# Patient Record
Sex: FEMALE | Race: BLACK OR AFRICAN AMERICAN | NOT HISPANIC OR LATINO | URBAN - METROPOLITAN AREA
[De-identification: names, ages, dates, MRNs, and addresses within clinical notes are randomized per-mention and may not be internally consistent; named-entity substitution may affect disease eponyms.]

---

## 2021-12-21 ENCOUNTER — EMERGENCY (EMERGENCY)
Facility: HOSPITAL | Age: 46
LOS: 0 days | Discharge: HOME | End: 2021-12-21
Attending: EMERGENCY MEDICINE
Payer: COMMERCIAL

## 2021-12-21 VITALS
WEIGHT: 177.91 LBS | HEART RATE: 100 BPM | DIASTOLIC BLOOD PRESSURE: 95 MMHG | RESPIRATION RATE: 20 BRPM | SYSTOLIC BLOOD PRESSURE: 153 MMHG | TEMPERATURE: 98 F | OXYGEN SATURATION: 99 % | HEIGHT: 62 IN

## 2021-12-21 VITALS
SYSTOLIC BLOOD PRESSURE: 150 MMHG | OXYGEN SATURATION: 97 % | TEMPERATURE: 99 F | RESPIRATION RATE: 18 BRPM | DIASTOLIC BLOOD PRESSURE: 87 MMHG | HEART RATE: 90 BPM

## 2021-12-21 DIAGNOSIS — M79.644 PAIN IN RIGHT FINGER(S): ICD-10-CM

## 2021-12-21 DIAGNOSIS — I10 ESSENTIAL (PRIMARY) HYPERTENSION: ICD-10-CM

## 2021-12-21 DIAGNOSIS — L03.011 CELLULITIS OF RIGHT FINGER: ICD-10-CM

## 2021-12-21 PROCEDURE — 10061 I&D ABSCESS COMP/MULTIPLE: CPT

## 2021-12-21 PROCEDURE — 99283 EMERGENCY DEPT VISIT LOW MDM: CPT | Mod: 25

## 2021-12-21 RX ORDER — IBUPROFEN 200 MG
1 TABLET ORAL
Qty: 15 | Refills: 0
Start: 2021-12-21 | End: 2021-12-25

## 2021-12-21 RX ORDER — OXYCODONE AND ACETAMINOPHEN 5; 325 MG/1; MG/1
1 TABLET ORAL
Qty: 15 | Refills: 0
Start: 2021-12-21 | End: 2021-12-25

## 2021-12-21 RX ADMIN — Medication 1 TABLET(S): at 12:49

## 2021-12-21 NOTE — ED PROVIDER NOTE - CARE PLAN
Principal Discharge DX:	Paronychia of finger, right   1 Principal Discharge DX:	Felon of finger of right hand

## 2021-12-21 NOTE — ED PROVIDER NOTE - PROVIDER TOKENS
PROVIDER:[TOKEN:[[id number]],FOLLOWUP:[4-6 Days]] PROVIDER:[TOKEN:[[id number]],FOLLOWUP:[1-3 Days]]

## 2021-12-21 NOTE — ED PROVIDER NOTE - CARE PROVIDER_API CALL
Carlos Estevez (MD)  Plastic Surgery  [address] [place] [address], [address]  [place], [place] [id number]  Phone: [phone]  Fax: [phone]  Follow Up Time: 4-6 Days   Carlos Estevez (MD)  Plastic Surgery  [address] [place] [address], [address]  [place], [place] [id number]  Phone: [phone]  Fax: [phone]  Follow Up Time: 1-3 Days

## 2021-12-21 NOTE — ED ADULT TRIAGE NOTE - CHIEF COMPLAINT QUOTE
Right middle finger with reddens and swelling around nail bed since Friday, stopped BP med due to pain in finger

## 2021-12-21 NOTE — ED PROVIDER NOTE - OBJECTIVE STATEMENT
46F no relevant med hx pw right middle finger throbbing pain. pt notes symptoms x 4 days. 1 week ago she felt something poke her into the skin under the fingernail. now it hurts a lot and is red and swollen. no fever, chills, nausea, vomiting. ros neg for ha, vision loss, rhinorrhea, dysuria, rash, bleeding, cp, sob, cough, anxiety

## 2021-12-21 NOTE — ED PROVIDER NOTE - NSFOLLOWUPINSTRUCTIONS_ED_ALL_ED_FT
Take both antibiotics as prescribed completely.    Call the hand specialist for a follow up appointment. Take both antibiotics as prescribed completely.    Call the hand specialist for a follow up appointment.    Take ibuprofen for moderate pain.    Take percocet for severe pain.    The packing strips need to be removed - the hand specialist can otherwise, return to the ER.

## 2021-12-21 NOTE — ED ADULT NURSE NOTE - OBJECTIVE STATEMENT
Patient A&Ox4. Pt states that on Friday when she was reaching into her bag when something poked under her nail on her right hand middle finger and caused her to bleed. Pt states pain got worse over the last few days and she was worried it was infected. Pain 10/10. No bleeding noted now. Denies fever, chills. PMH HTN.

## 2021-12-21 NOTE — ED PROVIDER NOTE - PHYSICAL EXAMINATION
Gen: Alert, NAD  Head: NC, AT   Eyes: PERRL, EOMI, normal lids/conjunctiva  ENT: normal hearing, patent oropharynx without erythema/exudate, uvula midline  Neck: supple, no tenderness, Trachea midline  Pulm: Bilateral BS, normal resp effort, no wheeze/stridor/retractions  CV: RRR, no M/R/G, 2+ radial and dp pulses bl, no edema  Abd: soft, NT/ND, +BS, no hepatosplenomegaly  Mskel: extremities x4 with normal ROM and no joint effusions. no ctl spine ttp.   Skin: right middle finger paronychia   Neuro: AAOx3, no sensory/motor deficits, CN 2-12 intact Gen: Alert, NAD  Head: NC, AT   Eyes: PERRL, EOMI, normal lids/conjunctiva  ENT: normal hearing, patent oropharynx without erythema/exudate, uvula midline  Neck: supple, no tenderness, Trachea midline  Pulm: Bilateral BS, normal resp effort, no wheeze/stridor/retractions  CV: RRR, no M/R/G, 2+ radial and dp pulses bl, no edema  Abd: soft, NT/ND, +BS, no hepatosplenomegaly  Mskel: extremities x4 with normal ROM and no joint effusions. no ctl spine ttp.   Skin: right middle finger felon  Neuro: AAOx3, no sensory/motor deficits, CN 2-12 intact

## 2021-12-21 NOTE — ED PROVIDER NOTE - PATIENT PORTAL LINK FT
You can access the FollowMyHealth Patient Portal offered by [facility] by registering at the following website: [email or web address]. By joining [facility] [employer]’s FollowMyHealth portal, you will also be able to view your health information using other applications (apps) compatible with our system.

## 2021-12-23 PROBLEM — Z00.00 ENCOUNTER FOR PREVENTIVE HEALTH EXAMINATION: Status: ACTIVE | Noted: 2021-12-23

## 2022-03-16 NOTE — ED ADULT NURSE NOTE - LATERALITY
From: Alexsandra Bonilla  To: Karena Gamble  Sent: 3/15/2022 6:53 PM CDT  Subject: Mycroplasm    Hi I went to  my medication moxifloxacin (AVELOX) many attempts they keep saying it’s not there for me and that u never sent it   
right 3rd

## 2022-12-20 PROBLEM — I10 ESSENTIAL (PRIMARY) HYPERTENSION: Chronic | Status: ACTIVE | Noted: 2021-12-21

## 2022-12-27 ENCOUNTER — APPOINTMENT (OUTPATIENT)
Dept: BREAST CENTER | Facility: CLINIC | Age: 47
End: 2022-12-27

## 2022-12-27 ENCOUNTER — NON-APPOINTMENT (OUTPATIENT)
Age: 47
End: 2022-12-27

## 2022-12-27 VITALS
BODY MASS INDEX: 33.15 KG/M2 | HEART RATE: 80 BPM | DIASTOLIC BLOOD PRESSURE: 86 MMHG | SYSTOLIC BLOOD PRESSURE: 143 MMHG | HEIGHT: 62 IN | WEIGHT: 180.13 LBS

## 2022-12-27 DIAGNOSIS — I10 ESSENTIAL (PRIMARY) HYPERTENSION: ICD-10-CM

## 2022-12-27 DIAGNOSIS — Z78.9 OTHER SPECIFIED HEALTH STATUS: ICD-10-CM

## 2022-12-27 PROCEDURE — 99205 OFFICE O/P NEW HI 60 MIN: CPT

## 2022-12-29 NOTE — PHYSICAL EXAM
[Examined in the supine and seated position] : examined in the supine and seated position [Asymmetrical] : asymmetrical [No dominant masses] : no dominant masses in right breast  [No Nipple Retraction] : no left nipple retraction [No Nipple Discharge] : no left nipple discharge [No Axillary Lymphadenopathy] : no right axillary lymphadenopathy [de-identified] : Posterior cervical chain palpable lymph node  [de-identified] : No discrete masses [de-identified] : Palpable lymphadenopathy

## 2022-12-29 NOTE — DATA REVIEWED
[FreeTextEntry1] : 12/2/22 B/L dxMMG/US ([facility]): The left breast 0.6 x 0.5 x 0.5 cm indistinct irregular hypoechoic mass at 10:00 14 CMFN corresponds with mammography and is suspicious for malignancy. Left breast ultrasound-guided core biopsy with microclip placement and postprocedural mammographic correlation is recommended. The 0.9 x 0.8 x 0.7 cm irregular complex cystic mass at 2:00 4 CMFN is indeterminate. Left breast ultrasound-guided core biopsy with microclip placement and postprocedural mammographic correlation is recommended. The 1.8 x 1.2 cm abnormally thickened lymph node at 2:00 16 CMFN is suspicious for malignancy. Left breast ultrasound-guided core biopsy with microclip placement and postprocedural mammographic correlation is recommended. No mammographic or sonographic evidence of malignancy in the right breast. BIRADS 5.\par

## 2022-12-29 NOTE — ASSESSMENT
[FreeTextEntry1] : 46 yo F referred by Dr. 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 Patient initially experienced R breast symptoms in November 2022 prompting diagnostic workup that found L breast cancer. Of note, third biopsy also performed LEFT 2n4 showing radial scar with calcifications. Palpable L axillary and posterior cervical chain lymphadenopathy on physical exam. Discussed patient's diagnosis and treatment options in detail. She states she already has PET/CT scheduled at outside imaging facility on 12/29/22 and will send us report. She will have PET/CT, B/L MRI, and office visit with medical oncology Dr. Chavis within the next week and return to our office for reexamination in 2 weeks. Patient verbalized understanding and agreement of plan.\par \par Discussed importance and implication of genetic testing in regards to her family history and offspring. Patient would like to go forward with genetic testing, invitae 47 gene panel sent. Enrolled patient in [employer] study, consent obtained and a copy given to patient.\par \par \par \par

## 2022-12-29 NOTE — CONSULT LETTER
[Dear  ___] : Dear ~RONNIE, [Consult Letter:] : I had the pleasure of evaluating your patient, [unfilled]. [Please see my note below.] : Please see my note below. [Consult Closing:] : Thank you very much for allowing me to participate in the care of this patient.  If you have any questions, please do not hesitate to contact me. [Sincerely,] : Sincerely, [FreeTextEntry2] : Dr. Cottrell [FreeTextEntry3] : Gautam\par \par Gautam Leal MD\par Chief of Breast Surgery\par Director of Breast Cancer Program\par [facility]\par \par \par

## 2022-12-29 NOTE — HISTORY OF PRESENT ILLNESS
[FreeTextEntry1] : 48 yo F referred by Dr. 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 Patient initially experienced R breast symptoms which she describes as similar to menstrual symptoms in November 2022 prompting diagnostic workup that detected L breast cancer. Of note, third biopsy also performed LEFT 2n4 showing radial scar with calcifications. \par \par Denies family history of breast or ovarian CA. Denies any history of breast surgeries or biopsies.\par Denies any palpable abnormalities, skin changes, nipple changes, or nipple discharge bilaterally.\par

## 2022-12-29 NOTE — PAST MEDICAL HISTORY
[Menstruating] : The patient is menstruating [Menarche Age ____] : age at menarche was [unfilled] [Definite ___ (Date)] : the last menstrual period was [unfilled] [Regular Cycle Intervals] : have been regular [Total Preg ___] : G[unfilled] [Live Births ___] : P[unfilled]  [Premature ___] : Premature: [unfilled] [Age At Live Birth ___] : Age at live birth: [unfilled] [History of Hormone Replacement Treatment] : has no history of hormone replacement treatment [FreeTextEntry6] : no [FreeTextEntry7] : no [FreeTextEntry8] : yes

## 2022-12-30 ENCOUNTER — APPOINTMENT (OUTPATIENT)
Dept: MRI IMAGING | Facility: HOSPITAL | Age: 47
End: 2022-12-30

## 2022-12-30 ENCOUNTER — OUTPATIENT (OUTPATIENT)
Dept: OUTPATIENT SERVICES | Facility: HOSPITAL | Age: 47
LOS: 1 days | End: 2022-12-30
Payer: COMMERCIAL

## 2022-12-30 PROCEDURE — 77049 MRI BREAST C-+ W/CAD BI: CPT | Mod: 26

## 2022-12-30 PROCEDURE — C8937: CPT

## 2022-12-30 PROCEDURE — A9585: CPT

## 2022-12-30 PROCEDURE — C8908: CPT

## 2023-01-03 ENCOUNTER — APPOINTMENT (OUTPATIENT)
Dept: HEMATOLOGY ONCOLOGY | Facility: CLINIC | Age: 48
End: 2023-01-03
Payer: COMMERCIAL

## 2023-01-03 ENCOUNTER — TRANSCRIPTION ENCOUNTER (OUTPATIENT)
Age: 48
End: 2023-01-03

## 2023-01-03 VITALS
WEIGHT: 179 LBS | OXYGEN SATURATION: 99 % | RESPIRATION RATE: 18 BRPM | TEMPERATURE: 98.9 F | HEART RATE: 78 BPM | DIASTOLIC BLOOD PRESSURE: 89 MMHG | HEIGHT: 62 IN | SYSTOLIC BLOOD PRESSURE: 138 MMHG | BODY MASS INDEX: 32.94 KG/M2

## 2023-01-03 PROCEDURE — 99205 OFFICE O/P NEW HI 60 MIN: CPT

## 2023-01-03 NOTE — HISTORY OF PRESENT ILLNESS
[Disease: _____________________] : Disease: [unfilled] [T: ___] : T[unfilled] [N: ___] : N[unfilled] [M: ___] : M[unfilled] [AJCC Stage: ____] : AJCC Stage: [unfilled] [de-identified] : 48 y/o female who is being seen at the request of Dr. Leal to discuss neoadjuvant systemic treatment options for a recently diagnosed left breast cancer.\par \par Patient initially experienced R breast symptoms which she describes as similar to menstrual symptoms in November 2022 prompting diagnostic workup that detected a left breast cancer, invasive ductal carcinoma, ER/[place] (-) HER-2 (+) with metastasis to lymph node. Left breast and axillary lymph node were both biopsied on 12/9/22.\par  \par Denies family history of breast or ovarian CA. Denies any history of breast surgeries or biopsies.\par \par Denies any palpable abnormalities, skin changes, nipple changes, or nipple discharge bilaterally. [de-identified] : invasive ductal carcinoma [de-identified] : ER-, [place]- HER2+

## 2023-01-03 NOTE — PHYSICAL EXAM
[Normal] : affect appropriate [de-identified] : Left breast mass not easily palpable. Left axillary lymph node enlarged

## 2023-01-03 NOTE — REASON FOR VISIT
[Consultation] : a consultation visit [Family Member] : family member [FreeTextEntry1] : Left breast cancer with lymph node metastasis

## 2023-01-04 ENCOUNTER — NON-APPOINTMENT (OUTPATIENT)
Age: 48
End: 2023-01-04

## 2023-01-04 DIAGNOSIS — R92.8 OTHER ABNORMAL AND INCONCLUSIVE FINDINGS ON DIAGNOSTIC IMAGING OF BREAST: ICD-10-CM

## 2023-01-06 ENCOUNTER — NON-APPOINTMENT (OUTPATIENT)
Age: 48
End: 2023-01-06

## 2023-01-06 ENCOUNTER — APPOINTMENT (OUTPATIENT)
Dept: NUCLEAR MEDICINE | Facility: HOSPITAL | Age: 48
End: 2023-01-06

## 2023-01-06 ENCOUNTER — OUTPATIENT (OUTPATIENT)
Dept: OUTPATIENT SERVICES | Facility: HOSPITAL | Age: 48
LOS: 1 days | End: 2023-01-06
Payer: COMMERCIAL

## 2023-01-06 ENCOUNTER — RESULT REVIEW (OUTPATIENT)
Age: 48
End: 2023-01-06

## 2023-01-06 LAB — GLUCOSE BLDC GLUCOMTR-MCNC: 91 MG/DL — SIGNIFICANT CHANGE UP (ref 70–99)

## 2023-01-06 PROCEDURE — A9552: CPT

## 2023-01-06 PROCEDURE — 78815 PET IMAGE W/CT SKULL-THIGH: CPT

## 2023-01-06 PROCEDURE — 78815 PET IMAGE W/CT SKULL-THIGH: CPT | Mod: 26,PI

## 2023-01-06 PROCEDURE — 82962 GLUCOSE BLOOD TEST: CPT

## 2023-01-10 ENCOUNTER — NON-APPOINTMENT (OUTPATIENT)
Age: 48
End: 2023-01-10

## 2023-01-10 ENCOUNTER — APPOINTMENT (OUTPATIENT)
Dept: BREAST CENTER | Facility: CLINIC | Age: 48
End: 2023-01-10

## 2023-01-10 ENCOUNTER — APPOINTMENT (OUTPATIENT)
Dept: ULTRASOUND IMAGING | Facility: HOSPITAL | Age: 48
End: 2023-01-10

## 2023-01-10 ENCOUNTER — RESULT REVIEW (OUTPATIENT)
Age: 48
End: 2023-01-10

## 2023-01-10 ENCOUNTER — OUTPATIENT (OUTPATIENT)
Dept: OUTPATIENT SERVICES | Facility: HOSPITAL | Age: 48
LOS: 1 days | End: 2023-01-10
Payer: COMMERCIAL

## 2023-01-10 ENCOUNTER — APPOINTMENT (OUTPATIENT)
Dept: HEMATOLOGY ONCOLOGY | Facility: CLINIC | Age: 48
End: 2023-01-10
Payer: COMMERCIAL

## 2023-01-10 VITALS
OXYGEN SATURATION: 98 % | WEIGHT: 178 LBS | RESPIRATION RATE: 18 BRPM | TEMPERATURE: 98.5 F | HEART RATE: 89 BPM | HEIGHT: 62 IN | BODY MASS INDEX: 32.76 KG/M2 | SYSTOLIC BLOOD PRESSURE: 135 MMHG | DIASTOLIC BLOOD PRESSURE: 85 MMHG

## 2023-01-10 DIAGNOSIS — C50.912 MALIGNANT NEOPLASM OF UNSPECIFIED SITE OF LEFT FEMALE BREAST: ICD-10-CM

## 2023-01-10 DIAGNOSIS — M26.609 UNSPECIFIED TEMPOROMANDIBULAR JOINT DISORDER: ICD-10-CM

## 2023-01-10 DIAGNOSIS — E04.2 NONTOXIC MULTINODULAR GOITER: ICD-10-CM

## 2023-01-10 DIAGNOSIS — E04.9 NONTOXIC GOITER, UNSPECIFIED: ICD-10-CM

## 2023-01-10 PROCEDURE — 88305 TISSUE EXAM BY PATHOLOGIST: CPT | Mod: 26,59

## 2023-01-10 PROCEDURE — 77065 DX MAMMO INCL CAD UNI: CPT

## 2023-01-10 PROCEDURE — 88360 TUMOR IMMUNOHISTOCHEM/MANUAL: CPT

## 2023-01-10 PROCEDURE — A4648: CPT

## 2023-01-10 PROCEDURE — 19083 BX BREAST 1ST LESION US IMAG: CPT | Mod: LT

## 2023-01-10 PROCEDURE — 88321 CONSLTJ&REPRT SLD PREP ELSWR: CPT

## 2023-01-10 PROCEDURE — 77065 DX MAMMO INCL CAD UNI: CPT | Mod: 26,LT

## 2023-01-10 PROCEDURE — 93306 TTE W/DOPPLER COMPLETE: CPT

## 2023-01-10 PROCEDURE — 93356 MYOCRD STRAIN IMG SPCKL TRCK: CPT | Mod: 26

## 2023-01-10 PROCEDURE — 76642 ULTRASOUND BREAST LIMITED: CPT | Mod: 26,LT

## 2023-01-10 PROCEDURE — 88360 TUMOR IMMUNOHISTOCHEM/MANUAL: CPT | Mod: 26,59

## 2023-01-10 PROCEDURE — 99215 OFFICE O/P EST HI 40 MIN: CPT

## 2023-01-10 PROCEDURE — 19083 BX BREAST 1ST LESION US IMAG: CPT

## 2023-01-10 PROCEDURE — 93306 TTE W/DOPPLER COMPLETE: CPT | Mod: 26

## 2023-01-10 PROCEDURE — 76642 ULTRASOUND BREAST LIMITED: CPT

## 2023-01-10 PROCEDURE — 88341 IMHCHEM/IMCYTCHM EA ADD ANTB: CPT

## 2023-01-10 PROCEDURE — 88305 TISSUE EXAM BY PATHOLOGIST: CPT

## 2023-01-11 PROBLEM — M26.609 TMJ (TEMPOROMANDIBULAR JOINT DISORDER): Status: ACTIVE | Noted: 2023-01-11

## 2023-01-11 PROBLEM — E04.9 GOITER, NODULAR: Status: ACTIVE | Noted: 2023-01-11

## 2023-01-11 PROBLEM — E04.2 MULTIPLE THYROID NODULES: Status: ACTIVE | Noted: 2023-01-11

## 2023-01-11 LAB
SURGICAL PATHOLOGY STUDY: SIGNIFICANT CHANGE UP
SURGICAL PATHOLOGY STUDY: SIGNIFICANT CHANGE UP

## 2023-01-11 NOTE — REVIEW OF SYSTEMS
[Anxiety] : anxiety [Depression] : depression [Dysphagia] : no dysphagia [Chest Pain] : no chest pain [Shortness Of Breath] : no shortness of breath [Suicidal] : not suicidal [de-identified] : multinodular thyroid goiter

## 2023-01-11 NOTE — HISTORY OF PRESENT ILLNESS
[de-identified] : 46 y/o female who is being seen at the request of Dr. Leal to discuss neoadjuvant systemic treatment options for a recently diagnosed left breast cancer.  We are planning to start neoadjuvant TCHP pending completion of initial staging work-up.\par \par Patient initially experienced R breast heaviness which she describes as similar to menstrual symptoms in November 2022 prompting diagnostic workup that detected a left breast cancer, invasive ductal carcinoma, ER/[place] (-) HER-2 (+) with metastasis to lymph node. Left breast and axillary lymph node were both biopsied on 12/9/22.\par  \par Denies family history of breast or ovarian CA. Denies any history of breast surgeries or biopsies.\par \par Denies any palpable abnormalities, skin changes, nipple changes, or nipple discharge bilaterally.\par \par 12/2/22 B/L dxMMG/US ([facility]): The left breast 0.6 x 0.5 x 0.5 cm indistinct irregular hypoechoic mass at 10:00 14 CMFN corresponds with mammography and is suspicious for malignancy. The 0.9 x 0.8 x 0.7 cm irregular complex cystic mass at 2:00 4 CMFN is indeterminate. The 1.8 x 1.2 cm abnormally thickened lymph node at 2:00 16 CMFN is suspicious for malignancy. No mammographic or sonographic evidence of malignancy in the right breast. BIRADS 5.\par \par 12/9/22 biopsy x 3:\par A. Left breast 2:00 4cmFN core biopsy: radial scar with calcifications, columnar cell change, cystic apocrine metaplasia.\par B. Left breast 2:00 16cm FN core biopsy: cortically thickened lymph node in the left axilla, metastatic carcinoma morphologically similar to part C.\par C. Left breast 10:00 14cm FN core biopsy: invasive moderately to poorly differentiated ductal carcinoma with micropapillary features, spanning at least 7 mm in this material.  Adjacent benign breast tissue with fibrocystic changes.  ER-, [place]-, HER2+ \par \par 12/30/22 MRI: \par 1. Left breast cancer 10:00 (14N) (buckle clip), newly diagnosed cancer.  Measures about 6 mm.\par 2. Left breast, 12:00 (15N) (near chest wall), there is an irregular 32 x 12 x 14 mm mass corresponding to the area of distortion seen on the recent mammogram.  There is no clip in this mass.  Recommend second look ultrasound and biopsy.  If not seen on ultrasound, recommend stereotactic biopsy.\par 3. Left 2:00 position, 4cm from  nipple (top hat shaped clip) there is an irregular 5 mm mass corresponding to the biopsy showing radial scar.\par 4. Left axillary adenopathy.  Corresponds to biopsy (cork clip) showing metastatic cancer.\par \par 1/6/23 PETCT results pending.\par \par 1/10/23 ECHO: LVEF 65-70%\par \par 1/10/23 L breast US: Left breast 12:00 14 cm from the nipple, there is an irregular 33 x 7 x 15 mm mass corresponding to MRI finding.\par US guided L breast core biopsy pathology pending. [de-identified] : moderately to poorly invasive ductal carcinoma [de-identified] : ER-, [place]- HER2+  [de-identified] : 12/27/22 ([facility]) 47 HBOC gene panel: negative for pathogenic mutations [de-identified] : Patient presents today for chemotherapy education.  She had a PETCT on 1/6/23, results are still pending.  She had a left breast US, biopsy and ECHO today.  Awaiting IR to schedule port placement.  She is tearful and overwhelmed by her diagnosis.  She is followed by ENT for thyroid multinodular goiter, had 5 biopsies that were benign, last thyroid US last year.  Denies any compressive symptoms, such as dysphagia or dyspnea.  She is premenopausal, LMP 12/2022, has an adult son, does not plan on having additional children, deferred fertility referral.  Sexually active, does not use contraception.

## 2023-01-11 NOTE — PAST MEDICAL HISTORY
[History of Hormone Replacement Treatment] : has no history of hormone replacement treatment [FreeTextEntry6] : no [FreeTextEntry7] : no [FreeTextEntry8] : yes

## 2023-01-11 NOTE — PHYSICAL EXAM
[Normal] : well developed, well nourished, in no acute distress [de-identified] : thyromegaly [de-identified] : no respiratory distress [de-identified] : tearful, upset and anxious about breast cancer diagnosis

## 2023-01-12 ENCOUNTER — NON-APPOINTMENT (OUTPATIENT)
Age: 48
End: 2023-01-12

## 2023-01-12 LAB
ALBUMIN SERPL ELPH-MCNC: 4.4 G/DL
ALP BLD-CCNC: 84 U/L
ALT SERPL-CCNC: 9 U/L
ANION GAP SERPL CALC-SCNC: 8 MMOL/L
APTT BLD: 34.3 SEC
AST SERPL-CCNC: 13 U/L
BASOPHILS # BLD AUTO: 0.02 K/UL
BASOPHILS NFR BLD AUTO: 0.3 %
BILIRUB SERPL-MCNC: 0.3 MG/DL
BUN SERPL-MCNC: 8 MG/DL
CALCIUM SERPL-MCNC: 9.2 MG/DL
CHLORIDE SERPL-SCNC: 103 MMOL/L
CO2 SERPL-SCNC: 28 MMOL/L
CREAT SERPL-MCNC: 0.68 MG/DL
EGFR: 109 ML/MIN/1.73M2
EOSINOPHIL # BLD AUTO: 0.22 K/UL
EOSINOPHIL NFR BLD AUTO: 3.7 %
GLUCOSE SERPL-MCNC: 101 MG/DL
HBV CORE IGG+IGM SER QL: NONREACTIVE
HBV CORE IGM SER QL: NONREACTIVE
HBV SURFACE AB SER QL: NONREACTIVE
HBV SURFACE AG SER QL: NONREACTIVE
HCT VFR BLD CALC: 38.1 %
HGB BLD-MCNC: 12.6 G/DL
IMM GRANULOCYTES NFR BLD AUTO: 0.2 %
INR PPP: 1.11
LYMPHOCYTES # BLD AUTO: 2.87 K/UL
LYMPHOCYTES NFR BLD AUTO: 48.9 %
MAN DIFF?: NORMAL
MCHC RBC-ENTMCNC: 28.7 PG
MCHC RBC-ENTMCNC: 33.1 GM/DL
MCV RBC AUTO: 86.8 FL
MONOCYTES # BLD AUTO: 0.52 K/UL
MONOCYTES NFR BLD AUTO: 8.9 %
NEUTROPHILS # BLD AUTO: 2.23 K/UL
NEUTROPHILS NFR BLD AUTO: 38 %
PLATELET # BLD AUTO: 361 K/UL
POTASSIUM SERPL-SCNC: 3.7 MMOL/L
PROT SERPL-MCNC: 8 G/DL
PT BLD: 13.2 SEC
RBC # BLD: 4.39 M/UL
RBC # FLD: 13.5 %
SODIUM SERPL-SCNC: 139 MMOL/L
WBC # FLD AUTO: 5.87 K/UL

## 2023-01-13 ENCOUNTER — APPOINTMENT (OUTPATIENT)
Dept: INFUSION THERAPY | Facility: CLINIC | Age: 48
End: 2023-01-13

## 2023-01-18 DIAGNOSIS — R59.0 LOCALIZED ENLARGED LYMPH NODES: ICD-10-CM

## 2023-01-19 ENCOUNTER — APPOINTMENT (OUTPATIENT)
Dept: HEMATOLOGY ONCOLOGY | Facility: CLINIC | Age: 48
End: 2023-01-19
Payer: COMMERCIAL

## 2023-01-19 VITALS
SYSTOLIC BLOOD PRESSURE: 141 MMHG | HEIGHT: 62 IN | RESPIRATION RATE: 18 BRPM | DIASTOLIC BLOOD PRESSURE: 92 MMHG | TEMPERATURE: 97.4 F | WEIGHT: 178 LBS | HEART RATE: 83 BPM | BODY MASS INDEX: 32.76 KG/M2

## 2023-01-19 PROCEDURE — 99215 OFFICE O/P EST HI 40 MIN: CPT

## 2023-01-19 NOTE — PHYSICAL EXAM
[Normal] : normoactive bowel sounds, soft and nontender, no hepatosplenomegaly or masses appreciated [de-identified] : Left breast mass unchanged

## 2023-01-19 NOTE — HISTORY OF PRESENT ILLNESS
[Disease: _____________________] : Disease: [unfilled] [T: ___] : T[unfilled] [N: ___] : N[unfilled] [M: ___] : M[unfilled] [AJCC Stage: ____] : AJCC Stage: [unfilled] [de-identified] : 48 y/o female who is being seen at the request of Dr. Leal to discuss neoadjuvant systemic treatment options for a recently diagnosed left breast cancer.  She is scheduled to start neoadjuvant TCHP on 1/23/23.\par \par PMHx:  HTN, multinodular goiter, followed by ENT, had 5 biopsies that were benign, last thyroid US last year.  Denies any compressive symptoms, such as dysphagia or dyspnea.  \par \par Patient initially experienced R breast heaviness which she describes as similar to menstrual symptoms in November 2022 prompting diagnostic workup that detected a left breast cancer, invasive ductal carcinoma, ER/[place] (-) HER-2 (+) with metastasis to lymph node. Left breast and axillary lymph node were both biopsied on 12/9/22.\par  \par Denies family history of breast or ovarian CA. Denies any history of breast surgeries or biopsies.\par \par Denies any palpable abnormalities, skin changes, nipple changes, or nipple discharge bilaterally.\par \par 12/2/22 B/L dxMMG/US ([facility]): The left breast 0.6 x 0.5 x 0.5 cm indistinct irregular hypoechoic mass at 10:00 14 CMFN corresponds with mammography and is suspicious for malignancy. The 0.9 x 0.8 x 0.7 cm irregular complex cystic mass at 2:00 4 CMFN is indeterminate. The 1.8 x 1.2 cm abnormally thickened lymph node at 2:00 16 CMFN is suspicious for malignancy. No mammographic or sonographic evidence of malignancy in the right breast. BIRADS 5.\par \par 12/9/22 biopsy x 3:\par A. Left breast 2:00 4cmFN core biopsy: radial scar with calcifications, columnar cell change, cystic apocrine metaplasia.\par B. Left breast 2:00 16cm FN core biopsy: cortically thickened lymph node in the left axilla, metastatic carcinoma morphologically similar to part C.\par C. Left breast 10:00 14cm FN core biopsy: invasive moderately to poorly differentiated ductal carcinoma with micropapillary features, spanning at least 7 mm in this material.  Adjacent benign breast tissue with fibrocystic changes.  ER-, [place]-, HER2+ \par \par 12/30/22 MRI: \par 1. Left breast cancer 10:00 (14N) (buckle clip), newly diagnosed cancer.  Measures about 6 mm.\par 2. Left breast, 12:00 (15N) (near chest wall), there is an irregular 32 x 12 x 14 mm mass corresponding to the area of distortion seen on the recent mammogram.  There is no clip in this mass.  Recommend second look ultrasound and biopsy.  If not seen on ultrasound, recommend stereotactic biopsy.\par 3. Left 2:00 position, 4cm from  nipple (top hat shaped clip) there is an irregular 5 mm mass corresponding to the biopsy showing radial scar.\par 4. Left axillary adenopathy.  Corresponds to biopsy (cork clip) showing metastatic cancer.\par \par 1/6/23 PETCT: \par 1. Multiple hypermetabolic left breast lesions and hypermetabolic left axillary lymphadenopathy, which is consistent with breast malignancy with local colton spread.  No evidence of distant FDG avid metastatic disease.\par 2. Diffuse left-sided asymmetric enlargement of the thyroid gland with heterogeneous attenuation, typical of multinodular goiter.  There is also slight rightward tracheal deviation due to mass effect without tracheal narrowing.\par \par 1/10/23 ECHO: LVEF 65-70%\par \par 1/10/23 L breast US: Left breast 12:00 14 cm from the nipple, there is an irregular 33 x 7 x 15 mm mass corresponding to MRI finding.\par US guided L breast core biopsy: Invasive ductal carcinoma, poorly-differentiated, DCIS. Concordant malignant.\par  [de-identified] : moderately to poorly invasive ductal carcinoma [de-identified] : ER-, [place]- HER2+  [de-identified] : 12/27/22 ([facility]) 47 HBOC gene panel: negative for pathogenic mutations [FreeTextEntry1] : NEOADJUVANT weekly paclitaxel and carboplatin x 12 cycles in combination with Kanjinti and pertuzumab every 3 weeks [de-identified] : Patient presents today for follow-up visit.  She is trying to set up a dentist appointment before treatment start for swollen gums. She is anxious and overwhelmed.  She is an  working on several projects, has an adult son (21) in college, and cares for a sick aunt and another family member.

## 2023-01-20 RX ORDER — DIPHENHYDRAMINE HCL 50 MG
25 CAPSULE ORAL ONCE
Refills: 0 | Status: COMPLETED | OUTPATIENT
Start: 2023-01-23 | End: 2023-01-23

## 2023-01-20 RX ORDER — FAMOTIDINE 10 MG/ML
20 INJECTION INTRAVENOUS ONCE
Refills: 0 | Status: COMPLETED | OUTPATIENT
Start: 2023-01-23 | End: 2023-01-23

## 2023-01-20 RX ORDER — DEXAMETHASONE 0.5 MG/5ML
10 ELIXIR ORAL ONCE
Refills: 0 | Status: COMPLETED | OUTPATIENT
Start: 2023-01-23 | End: 2023-01-23

## 2023-01-20 RX ORDER — DIPHENHYDRAMINE HCL 50 MG
25 CAPSULE ORAL ONCE
Refills: 0 | Status: COMPLETED | OUTPATIENT
Start: 2023-02-07 | End: 2023-02-07

## 2023-01-20 RX ORDER — DEXAMETHASONE 0.5 MG/5ML
4 ELIXIR ORAL ONCE
Refills: 0 | Status: COMPLETED | OUTPATIENT
Start: 2023-02-07 | End: 2023-02-07

## 2023-01-20 RX ORDER — DEXAMETHASONE 0.5 MG/5ML
4 ELIXIR ORAL ONCE
Refills: 0 | Status: COMPLETED | OUTPATIENT
Start: 2023-03-14 | End: 2023-03-14

## 2023-01-20 RX ORDER — DIPHENHYDRAMINE HCL 50 MG
25 CAPSULE ORAL ONCE
Refills: 0 | Status: COMPLETED | OUTPATIENT
Start: 2023-03-14 | End: 2023-03-14

## 2023-01-20 RX ORDER — FAMOTIDINE 10 MG/ML
20 INJECTION INTRAVENOUS ONCE
Refills: 0 | Status: COMPLETED | OUTPATIENT
Start: 2023-03-14 | End: 2023-03-14

## 2023-01-20 RX ORDER — ACETAMINOPHEN 500 MG
650 TABLET ORAL ONCE
Refills: 0 | Status: COMPLETED | OUTPATIENT
Start: 2023-02-07 | End: 2023-02-07

## 2023-01-20 RX ORDER — FAMOTIDINE 10 MG/ML
20 INJECTION INTRAVENOUS ONCE
Refills: 0 | Status: COMPLETED | OUTPATIENT
Start: 2023-02-07 | End: 2023-02-07

## 2023-01-20 RX ORDER — DIPHENHYDRAMINE HCL 50 MG
25 CAPSULE ORAL ONCE
Refills: 0 | Status: COMPLETED | OUTPATIENT
Start: 2023-02-21 | End: 2023-02-21

## 2023-01-20 RX ORDER — DEXAMETHASONE 0.5 MG/5ML
4 ELIXIR ORAL ONCE
Refills: 0 | Status: COMPLETED | OUTPATIENT
Start: 2023-02-21 | End: 2023-02-21

## 2023-01-20 RX ORDER — ACETAMINOPHEN 500 MG
650 TABLET ORAL ONCE
Refills: 0 | Status: COMPLETED | OUTPATIENT
Start: 2023-03-07 | End: 2023-03-07

## 2023-01-20 RX ORDER — ACETAMINOPHEN 500 MG
650 TABLET ORAL ONCE
Refills: 0 | Status: COMPLETED | OUTPATIENT
Start: 2023-02-21 | End: 2023-02-21

## 2023-01-20 RX ORDER — FAMOTIDINE 10 MG/ML
20 INJECTION INTRAVENOUS ONCE
Refills: 0 | Status: COMPLETED | OUTPATIENT
Start: 2023-02-21 | End: 2023-02-21

## 2023-01-20 RX ORDER — ACETAMINOPHEN 500 MG
650 TABLET ORAL ONCE
Refills: 0 | Status: COMPLETED | OUTPATIENT
Start: 2023-03-14 | End: 2023-03-14

## 2023-01-23 ENCOUNTER — APPOINTMENT (OUTPATIENT)
Dept: INFUSION THERAPY | Facility: CLINIC | Age: 48
End: 2023-01-23

## 2023-01-23 ENCOUNTER — NON-APPOINTMENT (OUTPATIENT)
Age: 48
End: 2023-01-23

## 2023-01-23 ENCOUNTER — OUTPATIENT (OUTPATIENT)
Dept: OUTPATIENT SERVICES | Facility: HOSPITAL | Age: 48
LOS: 1 days | End: 2023-01-23
Payer: COMMERCIAL

## 2023-01-23 VITALS
DIASTOLIC BLOOD PRESSURE: 86 MMHG | RESPIRATION RATE: 18 BRPM | WEIGHT: 175.93 LBS | HEART RATE: 61 BPM | SYSTOLIC BLOOD PRESSURE: 140 MMHG | TEMPERATURE: 99 F | HEIGHT: 62 IN | OXYGEN SATURATION: 100 %

## 2023-01-23 VITALS
RESPIRATION RATE: 18 BRPM | HEART RATE: 105 BPM | DIASTOLIC BLOOD PRESSURE: 85 MMHG | OXYGEN SATURATION: 98 % | TEMPERATURE: 97 F | SYSTOLIC BLOOD PRESSURE: 143 MMHG

## 2023-01-23 DIAGNOSIS — C50.912 MALIGNANT NEOPLASM OF UNSPECIFIED SITE OF LEFT FEMALE BREAST: ICD-10-CM

## 2023-01-23 LAB
ALBUMIN SERPL ELPH-MCNC: 3.6 G/DL — SIGNIFICANT CHANGE UP (ref 3.3–5)
ALP SERPL-CCNC: 67 U/L — SIGNIFICANT CHANGE UP (ref 40–120)
ALT FLD-CCNC: 14 U/L — SIGNIFICANT CHANGE UP (ref 10–45)
ANION GAP SERPL CALC-SCNC: 13 MMOL/L — SIGNIFICANT CHANGE UP (ref 5–17)
AST SERPL-CCNC: 21 U/L — SIGNIFICANT CHANGE UP (ref 10–40)
BILIRUB SERPL-MCNC: 0.7 MG/DL — SIGNIFICANT CHANGE UP (ref 0.2–1.2)
BUN SERPL-MCNC: 10 MG/DL — SIGNIFICANT CHANGE UP (ref 7–23)
CALCIUM SERPL-MCNC: 9.2 MG/DL — SIGNIFICANT CHANGE UP (ref 8.4–10.5)
CHLORIDE SERPL-SCNC: 104 MMOL/L — SIGNIFICANT CHANGE UP (ref 96–108)
CO2 SERPL-SCNC: 28 MMOL/L — SIGNIFICANT CHANGE UP (ref 22–31)
CREAT SERPL-MCNC: 0.5 MG/DL — SIGNIFICANT CHANGE UP (ref 0.5–1.3)
EGFR: 118 ML/MIN/1.73M2 — SIGNIFICANT CHANGE UP
GLUCOSE SERPL-MCNC: 102 MG/DL — HIGH (ref 70–99)
HCT VFR BLD CALC: 34.7 % — SIGNIFICANT CHANGE UP (ref 34.5–45)
HGB BLD-MCNC: 11.5 G/DL — SIGNIFICANT CHANGE UP (ref 11.5–15.5)
ISTAT TOTAL BETA HCG, PLASMA: <5 IU/L — SIGNIFICANT CHANGE UP
LYMPHOCYTES # BLD AUTO: 2.3 K/UL — SIGNIFICANT CHANGE UP (ref 1–3.3)
LYMPHOCYTES # BLD AUTO: 49.3 % — HIGH (ref 13–44)
MCHC RBC-ENTMCNC: 28.8 PG — SIGNIFICANT CHANGE UP (ref 27–34)
MCHC RBC-ENTMCNC: 33.1 GM/DL — SIGNIFICANT CHANGE UP (ref 32–36)
MCV RBC AUTO: 86.8 FL — SIGNIFICANT CHANGE UP (ref 80–100)
NEUTROPHILS # BLD AUTO: 1.9 K/UL — SIGNIFICANT CHANGE UP (ref 1.8–7.4)
NEUTROPHILS NFR BLD AUTO: 40.3 % — LOW (ref 43–77)
PLATELET # BLD AUTO: 315 K/UL — SIGNIFICANT CHANGE UP (ref 150–400)
POTASSIUM SERPL-MCNC: 3.8 MMOL/L — SIGNIFICANT CHANGE UP (ref 3.5–5.3)
POTASSIUM SERPL-SCNC: 3.8 MMOL/L — SIGNIFICANT CHANGE UP (ref 3.5–5.3)
PROT SERPL-MCNC: 7.5 G/DL — SIGNIFICANT CHANGE UP (ref 6–8.3)
RBC # BLD: 4 M/UL — SIGNIFICANT CHANGE UP (ref 3.8–5.2)
RBC # FLD: 14 % — SIGNIFICANT CHANGE UP (ref 10.3–14.5)
SARS-COV-2 RNA SPEC QL NAA+PROBE: NEGATIVE — SIGNIFICANT CHANGE UP
SODIUM SERPL-SCNC: 145 MMOL/L — SIGNIFICANT CHANGE UP (ref 135–145)
WBC # BLD: 4.7 K/UL — SIGNIFICANT CHANGE UP (ref 3.8–10.5)
WBC # FLD AUTO: 4.7 K/UL — SIGNIFICANT CHANGE UP (ref 3.8–10.5)

## 2023-01-23 PROCEDURE — 36415 COLL VENOUS BLD VENIPUNCTURE: CPT

## 2023-01-23 PROCEDURE — 80053 COMPREHEN METABOLIC PANEL: CPT

## 2023-01-23 PROCEDURE — 96375 TX/PRO/DX INJ NEW DRUG ADDON: CPT

## 2023-01-23 PROCEDURE — 85025 COMPLETE CBC W/AUTO DIFF WBC: CPT

## 2023-01-23 PROCEDURE — 96413 CHEMO IV INFUSION 1 HR: CPT

## 2023-01-23 PROCEDURE — U0003: CPT

## 2023-01-23 PROCEDURE — 84702 CHORIONIC GONADOTROPIN TEST: CPT

## 2023-01-23 PROCEDURE — 96417 CHEMO IV INFUS EACH ADDL SEQ: CPT

## 2023-01-23 RX ORDER — PALONOSETRON HYDROCHLORIDE 0.25 MG/5ML
0.25 INJECTION, SOLUTION INTRAVENOUS ONCE
Refills: 0 | Status: COMPLETED | OUTPATIENT
Start: 2023-01-23 | End: 2023-01-23

## 2023-01-23 RX ORDER — TRASTUZUMAB-DKST 420 MG/20ML
648 INJECTION, POWDER, LYOPHILIZED, FOR SOLUTION INTRAVENOUS ONCE
Refills: 0 | Status: COMPLETED | OUTPATIENT
Start: 2023-01-23 | End: 2023-01-23

## 2023-01-23 RX ORDER — PALONOSETRON HYDROCHLORIDE 0.25 MG/5ML
0.25 INJECTION, SOLUTION INTRAVENOUS ONCE
Refills: 0 | Status: COMPLETED | OUTPATIENT
Start: 2023-02-21 | End: 2023-02-21

## 2023-01-23 RX ORDER — PALONOSETRON HYDROCHLORIDE 0.25 MG/5ML
0.25 INJECTION, SOLUTION INTRAVENOUS ONCE
Refills: 0 | Status: COMPLETED | OUTPATIENT
Start: 2023-02-07 | End: 2023-02-07

## 2023-01-23 RX ORDER — PACLITAXEL 6 MG/ML
144 INJECTION, SOLUTION, CONCENTRATE INTRAVENOUS ONCE
Refills: 0 | Status: COMPLETED | OUTPATIENT
Start: 2023-01-23 | End: 2023-01-23

## 2023-01-23 RX ORDER — CARBOPLATIN 50 MG
317 VIAL (EA) INTRAVENOUS ONCE
Refills: 0 | Status: COMPLETED | OUTPATIENT
Start: 2023-01-23 | End: 2023-01-23

## 2023-01-23 RX ORDER — PERTUZUMAB 30 MG/ML
840 INJECTION, SOLUTION, CONCENTRATE INTRAVENOUS ONCE
Refills: 0 | Status: COMPLETED | OUTPATIENT
Start: 2023-01-23 | End: 2023-01-23

## 2023-01-23 RX ORDER — ACETAMINOPHEN 500 MG
650 TABLET ORAL ONCE
Refills: 0 | Status: COMPLETED | OUTPATIENT
Start: 2023-01-23 | End: 2023-01-23

## 2023-01-23 RX ADMIN — PACLITAXEL 144 MILLIGRAM(S): 6 INJECTION, SOLUTION, CONCENTRATE INTRAVENOUS at 15:00

## 2023-01-23 RX ADMIN — Medication 204 MILLIGRAM(S): at 13:45

## 2023-01-23 RX ADMIN — PACLITAXEL 144 MILLIGRAM(S): 6 INJECTION, SOLUTION, CONCENTRATE INTRAVENOUS at 14:00

## 2023-01-23 RX ADMIN — PALONOSETRON HYDROCHLORIDE 0.25 MILLIGRAM(S): 0.25 INJECTION, SOLUTION INTRAVENOUS at 12:52

## 2023-01-23 RX ADMIN — PERTUZUMAB 840 MILLIGRAM(S): 30 INJECTION, SOLUTION, CONCENTRATE INTRAVENOUS at 10:20

## 2023-01-23 RX ADMIN — Medication 650 MILLIGRAM(S): at 13:06

## 2023-01-23 RX ADMIN — FAMOTIDINE 20 MILLIGRAM(S): 10 INJECTION INTRAVENOUS at 13:30

## 2023-01-23 RX ADMIN — PERTUZUMAB 840 MILLIGRAM(S): 30 INJECTION, SOLUTION, CONCENTRATE INTRAVENOUS at 11:20

## 2023-01-23 RX ADMIN — Medication 25 MILLIGRAM(S): at 13:45

## 2023-01-23 RX ADMIN — TRASTUZUMAB-DKST 648 MILLIGRAM(S): 420 INJECTION, POWDER, LYOPHILIZED, FOR SOLUTION INTRAVENOUS at 11:30

## 2023-01-23 RX ADMIN — Medication 202 MILLIGRAM(S): at 13:30

## 2023-01-23 RX ADMIN — Medication 317 MILLIGRAM(S): at 15:10

## 2023-01-23 RX ADMIN — Medication 317 MILLIGRAM(S): at 15:40

## 2023-01-23 RX ADMIN — Medication 650 MILLIGRAM(S): at 12:36

## 2023-01-23 RX ADMIN — TRASTUZUMAB-DKST 648 MILLIGRAM(S): 420 INJECTION, POWDER, LYOPHILIZED, FOR SOLUTION INTRAVENOUS at 13:00

## 2023-01-23 RX ADMIN — Medication 10 MILLIGRAM(S): at 14:00

## 2023-01-23 RX ADMIN — FAMOTIDINE 208 MILLIGRAM(S): 10 INJECTION INTRAVENOUS at 13:15

## 2023-01-25 ENCOUNTER — NON-APPOINTMENT (OUTPATIENT)
Age: 48
End: 2023-01-25

## 2023-01-31 ENCOUNTER — OUTPATIENT (OUTPATIENT)
Dept: OUTPATIENT SERVICES | Facility: HOSPITAL | Age: 48
LOS: 1 days | End: 2023-01-31
Payer: COMMERCIAL

## 2023-01-31 ENCOUNTER — APPOINTMENT (OUTPATIENT)
Dept: INFUSION THERAPY | Facility: CLINIC | Age: 48
End: 2023-01-31

## 2023-01-31 ENCOUNTER — LABORATORY RESULT (OUTPATIENT)
Age: 48
End: 2023-01-31

## 2023-01-31 VITALS
DIASTOLIC BLOOD PRESSURE: 88 MMHG | HEART RATE: 63 BPM | TEMPERATURE: 98 F | HEIGHT: 62 IN | SYSTOLIC BLOOD PRESSURE: 140 MMHG | OXYGEN SATURATION: 100 % | WEIGHT: 175.93 LBS | RESPIRATION RATE: 18 BRPM

## 2023-01-31 DIAGNOSIS — C50.912 MALIGNANT NEOPLASM OF UNSPECIFIED SITE OF LEFT FEMALE BREAST: ICD-10-CM

## 2023-01-31 DIAGNOSIS — Z01.812 ENCOUNTER FOR PREPROCEDURAL LABORATORY EXAMINATION: ICD-10-CM

## 2023-01-31 DIAGNOSIS — Z20.822 ENCOUNTER FOR PREPROCEDURAL LABORATORY EXAMINATION: ICD-10-CM

## 2023-01-31 LAB
ALBUMIN SERPL ELPH-MCNC: 3.5 G/DL — SIGNIFICANT CHANGE UP (ref 3.3–5)
ALP SERPL-CCNC: 65 U/L — SIGNIFICANT CHANGE UP (ref 40–120)
ALT FLD-CCNC: 19 U/L — SIGNIFICANT CHANGE UP (ref 10–45)
ANION GAP SERPL CALC-SCNC: 12 MMOL/L — SIGNIFICANT CHANGE UP (ref 5–17)
AST SERPL-CCNC: 20 U/L — SIGNIFICANT CHANGE UP (ref 10–40)
BILIRUB SERPL-MCNC: 0.6 MG/DL — SIGNIFICANT CHANGE UP (ref 0.2–1.2)
BUN SERPL-MCNC: 11 MG/DL — SIGNIFICANT CHANGE UP (ref 7–23)
CALCIUM SERPL-MCNC: 8.9 MG/DL — SIGNIFICANT CHANGE UP (ref 8.4–10.5)
CHLORIDE SERPL-SCNC: 104 MMOL/L — SIGNIFICANT CHANGE UP (ref 96–108)
CO2 SERPL-SCNC: 28 MMOL/L — SIGNIFICANT CHANGE UP (ref 22–31)
CREAT SERPL-MCNC: 0.8 MG/DL — SIGNIFICANT CHANGE UP (ref 0.5–1.3)
EGFR: 93 ML/MIN/1.73M2 — SIGNIFICANT CHANGE UP
GLUCOSE SERPL-MCNC: 101 MG/DL — HIGH (ref 70–99)
HCT VFR BLD CALC: 33.1 % — LOW (ref 34.5–45)
HGB BLD-MCNC: 11 G/DL — LOW (ref 11.5–15.5)
ISTAT TOTAL BETA HCG, PLASMA: <5 IU/L — SIGNIFICANT CHANGE UP
LYMPHOCYTES # BLD AUTO: 1.8 K/UL — SIGNIFICANT CHANGE UP (ref 1–3.3)
LYMPHOCYTES # BLD AUTO: 45 % — HIGH (ref 13–44)
MCHC RBC-ENTMCNC: 28.9 PG — SIGNIFICANT CHANGE UP (ref 27–34)
MCHC RBC-ENTMCNC: 33.2 GM/DL — SIGNIFICANT CHANGE UP (ref 32–36)
MCV RBC AUTO: 86.9 FL — SIGNIFICANT CHANGE UP (ref 80–100)
NEUTROPHILS # BLD AUTO: 2 K/UL — SIGNIFICANT CHANGE UP (ref 1.8–7.4)
NEUTROPHILS NFR BLD AUTO: 50.3 % — SIGNIFICANT CHANGE UP (ref 43–77)
PLATELET # BLD AUTO: 285 K/UL — SIGNIFICANT CHANGE UP (ref 150–400)
POTASSIUM SERPL-MCNC: 3.7 MMOL/L — SIGNIFICANT CHANGE UP (ref 3.5–5.3)
POTASSIUM SERPL-SCNC: 3.7 MMOL/L — SIGNIFICANT CHANGE UP (ref 3.5–5.3)
PROT SERPL-MCNC: 7.4 G/DL — SIGNIFICANT CHANGE UP (ref 6–8.3)
RBC # BLD: 3.81 M/UL — SIGNIFICANT CHANGE UP (ref 3.8–5.2)
RBC # FLD: 13.9 % — SIGNIFICANT CHANGE UP (ref 10.3–14.5)
SODIUM SERPL-SCNC: 144 MMOL/L — SIGNIFICANT CHANGE UP (ref 135–145)
WBC # BLD: 4 K/UL — SIGNIFICANT CHANGE UP (ref 3.8–10.5)
WBC # FLD AUTO: 4 K/UL — SIGNIFICANT CHANGE UP (ref 3.8–10.5)

## 2023-01-31 PROCEDURE — 96417 CHEMO IV INFUS EACH ADDL SEQ: CPT

## 2023-01-31 PROCEDURE — 84702 CHORIONIC GONADOTROPIN TEST: CPT

## 2023-01-31 PROCEDURE — 96413 CHEMO IV INFUSION 1 HR: CPT

## 2023-01-31 PROCEDURE — 36415 COLL VENOUS BLD VENIPUNCTURE: CPT

## 2023-01-31 PROCEDURE — 85025 COMPLETE CBC W/AUTO DIFF WBC: CPT

## 2023-01-31 PROCEDURE — 80053 COMPREHEN METABOLIC PANEL: CPT

## 2023-01-31 PROCEDURE — 96375 TX/PRO/DX INJ NEW DRUG ADDON: CPT

## 2023-01-31 RX ORDER — DIPHENHYDRAMINE HCL 50 MG
25 CAPSULE ORAL ONCE
Refills: 0 | Status: COMPLETED | OUTPATIENT
Start: 2023-01-31 | End: 2023-01-31

## 2023-01-31 RX ORDER — ACETAMINOPHEN 500 MG
650 TABLET ORAL ONCE
Refills: 0 | Status: COMPLETED | OUTPATIENT
Start: 2023-01-31 | End: 2023-01-31

## 2023-01-31 RX ORDER — DEXAMETHASONE 0.5 MG/5ML
4 ELIXIR ORAL ONCE
Refills: 0 | Status: COMPLETED | OUTPATIENT
Start: 2023-01-31 | End: 2023-01-31

## 2023-01-31 RX ORDER — FAMOTIDINE 10 MG/ML
20 INJECTION INTRAVENOUS ONCE
Refills: 0 | Status: COMPLETED | OUTPATIENT
Start: 2023-01-31 | End: 2023-01-31

## 2023-01-31 RX ORDER — PALONOSETRON HYDROCHLORIDE 0.25 MG/5ML
0.25 INJECTION, SOLUTION INTRAVENOUS ONCE
Refills: 0 | Status: COMPLETED | OUTPATIENT
Start: 2023-01-31 | End: 2023-01-31

## 2023-01-31 RX ORDER — PACLITAXEL 6 MG/ML
144 INJECTION, SOLUTION, CONCENTRATE INTRAVENOUS ONCE
Refills: 0 | Status: COMPLETED | OUTPATIENT
Start: 2023-01-31 | End: 2023-01-31

## 2023-01-31 RX ORDER — CARBOPLATIN 50 MG
317 VIAL (EA) INTRAVENOUS ONCE
Refills: 0 | Status: COMPLETED | OUTPATIENT
Start: 2023-01-31 | End: 2023-01-31

## 2023-01-31 RX ADMIN — Medication 204 MILLIGRAM(S): at 10:48

## 2023-01-31 RX ADMIN — Medication 202 MILLIGRAM(S): at 11:03

## 2023-01-31 RX ADMIN — Medication 650 MILLIGRAM(S): at 10:48

## 2023-01-31 RX ADMIN — PALONOSETRON HYDROCHLORIDE 0.25 MILLIGRAM(S): 0.25 INJECTION, SOLUTION INTRAVENOUS at 11:03

## 2023-01-31 RX ADMIN — Medication 650 MILLIGRAM(S): at 12:18

## 2023-01-31 RX ADMIN — FAMOTIDINE 208 MILLIGRAM(S): 10 INJECTION INTRAVENOUS at 10:40

## 2023-01-31 RX ADMIN — PACLITAXEL 144 MILLIGRAM(S): 6 INJECTION, SOLUTION, CONCENTRATE INTRAVENOUS at 12:20

## 2023-01-31 RX ADMIN — Medication 317 MILLIGRAM(S): at 12:54

## 2023-01-31 RX ADMIN — Medication 25 MILLIGRAM(S): at 11:18

## 2023-01-31 RX ADMIN — Medication 317 MILLIGRAM(S): at 12:26

## 2023-01-31 RX ADMIN — PACLITAXEL 144 MILLIGRAM(S): 6 INJECTION, SOLUTION, CONCENTRATE INTRAVENOUS at 11:14

## 2023-01-31 RX ADMIN — FAMOTIDINE 20 MILLIGRAM(S): 10 INJECTION INTRAVENOUS at 10:55

## 2023-01-31 RX ADMIN — Medication 4 MILLIGRAM(S): at 11:03

## 2023-02-02 ENCOUNTER — RESULT REVIEW (OUTPATIENT)
Age: 48
End: 2023-02-02

## 2023-02-02 ENCOUNTER — APPOINTMENT (OUTPATIENT)
Dept: INTERVENTIONAL RADIOLOGY/VASCULAR | Facility: HOSPITAL | Age: 48
End: 2023-02-02

## 2023-02-02 ENCOUNTER — APPOINTMENT (OUTPATIENT)
Dept: INTERVENTIONAL RADIOLOGY/VASCULAR | Facility: HOSPITAL | Age: 48
End: 2023-02-02
Payer: COMMERCIAL

## 2023-02-02 ENCOUNTER — OUTPATIENT (OUTPATIENT)
Dept: OUTPATIENT SERVICES | Facility: HOSPITAL | Age: 48
LOS: 1 days | End: 2023-02-02
Payer: COMMERCIAL

## 2023-02-02 PROCEDURE — 36561 INSERT TUNNELED CV CATH: CPT

## 2023-02-02 PROCEDURE — 76937 US GUIDE VASCULAR ACCESS: CPT

## 2023-02-02 PROCEDURE — C1788: CPT

## 2023-02-02 PROCEDURE — 99152 MOD SED SAME PHYS/QHP 5/>YRS: CPT

## 2023-02-02 PROCEDURE — 99153 MOD SED SAME PHYS/QHP EA: CPT

## 2023-02-02 PROCEDURE — 88305 TISSUE EXAM BY PATHOLOGIST: CPT

## 2023-02-02 PROCEDURE — 10005 FNA BX W/US GDN 1ST LES: CPT

## 2023-02-02 PROCEDURE — 88189 FLOWCYTOMETRY/READ 16 & >: CPT

## 2023-02-02 PROCEDURE — 76937 US GUIDE VASCULAR ACCESS: CPT | Mod: 26

## 2023-02-02 PROCEDURE — 88173 CYTOPATH EVAL FNA REPORT: CPT

## 2023-02-02 PROCEDURE — C1769: CPT

## 2023-02-02 PROCEDURE — 88305 TISSUE EXAM BY PATHOLOGIST: CPT | Mod: 26

## 2023-02-02 PROCEDURE — 88173 CYTOPATH EVAL FNA REPORT: CPT | Mod: 26

## 2023-02-04 LAB — NON-GYNECOLOGICAL CYTOLOGY STUDY: SIGNIFICANT CHANGE UP

## 2023-02-06 ENCOUNTER — NON-APPOINTMENT (OUTPATIENT)
Age: 48
End: 2023-02-06

## 2023-02-07 ENCOUNTER — APPOINTMENT (OUTPATIENT)
Dept: INFUSION THERAPY | Facility: CLINIC | Age: 48
End: 2023-02-07

## 2023-02-07 ENCOUNTER — OUTPATIENT (OUTPATIENT)
Dept: OUTPATIENT SERVICES | Facility: HOSPITAL | Age: 48
LOS: 1 days | End: 2023-02-07
Payer: COMMERCIAL

## 2023-02-07 VITALS
WEIGHT: 175.93 LBS | RESPIRATION RATE: 18 BRPM | HEIGHT: 62 IN | HEART RATE: 78 BPM | SYSTOLIC BLOOD PRESSURE: 140 MMHG | OXYGEN SATURATION: 99 % | DIASTOLIC BLOOD PRESSURE: 78 MMHG | TEMPERATURE: 98 F

## 2023-02-07 DIAGNOSIS — C50.912 MALIGNANT NEOPLASM OF UNSPECIFIED SITE OF LEFT FEMALE BREAST: ICD-10-CM

## 2023-02-07 LAB
ALBUMIN SERPL ELPH-MCNC: 3.7 G/DL — SIGNIFICANT CHANGE UP (ref 3.3–5)
ALP SERPL-CCNC: 59 U/L — SIGNIFICANT CHANGE UP (ref 40–120)
ALT FLD-CCNC: 37 U/L — SIGNIFICANT CHANGE UP (ref 10–45)
ANION GAP SERPL CALC-SCNC: 5 MMOL/L — SIGNIFICANT CHANGE UP (ref 5–17)
AST SERPL-CCNC: 30 U/L — SIGNIFICANT CHANGE UP (ref 10–40)
BILIRUB SERPL-MCNC: 0.6 MG/DL — SIGNIFICANT CHANGE UP (ref 0.2–1.2)
BUN SERPL-MCNC: 9 MG/DL — SIGNIFICANT CHANGE UP (ref 7–23)
CALCIUM SERPL-MCNC: 9.3 MG/DL — SIGNIFICANT CHANGE UP (ref 8.4–10.5)
CHLORIDE SERPL-SCNC: 107 MMOL/L — SIGNIFICANT CHANGE UP (ref 96–108)
CO2 SERPL-SCNC: 28 MMOL/L — SIGNIFICANT CHANGE UP (ref 22–31)
CREAT SERPL-MCNC: 0.8 MG/DL — SIGNIFICANT CHANGE UP (ref 0.5–1.3)
EGFR: 93 ML/MIN/1.73M2 — SIGNIFICANT CHANGE UP
GLUCOSE SERPL-MCNC: 98 MG/DL — SIGNIFICANT CHANGE UP (ref 70–99)
HCT VFR BLD CALC: 32.1 % — LOW (ref 34.5–45)
HGB BLD-MCNC: 10.6 G/DL — LOW (ref 11.5–15.5)
ISTAT TOTAL BETA HCG, PLASMA: <5 IU/L — SIGNIFICANT CHANGE UP
LYMPHOCYTES # BLD AUTO: 1.7 K/UL — SIGNIFICANT CHANGE UP (ref 1–3.3)
LYMPHOCYTES # BLD AUTO: 44.4 % — HIGH (ref 13–44)
MCHC RBC-ENTMCNC: 28.8 PG — SIGNIFICANT CHANGE UP (ref 27–34)
MCHC RBC-ENTMCNC: 33 GM/DL — SIGNIFICANT CHANGE UP (ref 32–36)
MCV RBC AUTO: 87.2 FL — SIGNIFICANT CHANGE UP (ref 80–100)
NEUTROPHILS # BLD AUTO: 1.9 K/UL — SIGNIFICANT CHANGE UP (ref 1.8–7.4)
NEUTROPHILS NFR BLD AUTO: 46.9 % — SIGNIFICANT CHANGE UP (ref 43–77)
PLATELET # BLD AUTO: 239 K/UL — SIGNIFICANT CHANGE UP (ref 150–400)
POTASSIUM SERPL-MCNC: 4.3 MMOL/L — SIGNIFICANT CHANGE UP (ref 3.5–5.3)
POTASSIUM SERPL-SCNC: 4.3 MMOL/L — SIGNIFICANT CHANGE UP (ref 3.5–5.3)
PROT SERPL-MCNC: 7.4 G/DL — SIGNIFICANT CHANGE UP (ref 6–8.3)
RBC # BLD: 3.68 M/UL — LOW (ref 3.8–5.2)
RBC # FLD: 14.1 % — SIGNIFICANT CHANGE UP (ref 10.3–14.5)
SODIUM SERPL-SCNC: 140 MMOL/L — SIGNIFICANT CHANGE UP (ref 135–145)
WBC # BLD: 3.9 K/UL — SIGNIFICANT CHANGE UP (ref 3.8–10.5)
WBC # FLD AUTO: 3.9 K/UL — SIGNIFICANT CHANGE UP (ref 3.8–10.5)

## 2023-02-07 PROCEDURE — 96375 TX/PRO/DX INJ NEW DRUG ADDON: CPT

## 2023-02-07 PROCEDURE — 85025 COMPLETE CBC W/AUTO DIFF WBC: CPT

## 2023-02-07 PROCEDURE — 96413 CHEMO IV INFUSION 1 HR: CPT

## 2023-02-07 PROCEDURE — 96417 CHEMO IV INFUS EACH ADDL SEQ: CPT

## 2023-02-07 PROCEDURE — 80053 COMPREHEN METABOLIC PANEL: CPT

## 2023-02-07 PROCEDURE — 84702 CHORIONIC GONADOTROPIN TEST: CPT

## 2023-02-07 PROCEDURE — 36415 COLL VENOUS BLD VENIPUNCTURE: CPT

## 2023-02-07 RX ORDER — CARBOPLATIN 50 MG
317 VIAL (EA) INTRAVENOUS ONCE
Refills: 0 | Status: COMPLETED | OUTPATIENT
Start: 2023-02-07 | End: 2023-02-07

## 2023-02-07 RX ORDER — PACLITAXEL 6 MG/ML
144 INJECTION, SOLUTION, CONCENTRATE INTRAVENOUS ONCE
Refills: 0 | Status: COMPLETED | OUTPATIENT
Start: 2023-02-07 | End: 2023-02-07

## 2023-02-07 RX ADMIN — Medication 25 MILLIGRAM(S): at 09:20

## 2023-02-07 RX ADMIN — FAMOTIDINE 208 MILLIGRAM(S): 10 INJECTION INTRAVENOUS at 09:20

## 2023-02-07 RX ADMIN — FAMOTIDINE 20 MILLIGRAM(S): 10 INJECTION INTRAVENOUS at 09:35

## 2023-02-07 RX ADMIN — PALONOSETRON HYDROCHLORIDE 0.25 MILLIGRAM(S): 0.25 INJECTION, SOLUTION INTRAVENOUS at 09:40

## 2023-02-07 RX ADMIN — Medication 4 MILLIGRAM(S): at 09:05

## 2023-02-07 RX ADMIN — Medication 204 MILLIGRAM(S): at 08:50

## 2023-02-07 RX ADMIN — Medication 202 MILLIGRAM(S): at 09:05

## 2023-02-07 RX ADMIN — Medication 317 MILLIGRAM(S): at 10:40

## 2023-02-07 RX ADMIN — Medication 650 MILLIGRAM(S): at 08:50

## 2023-02-07 RX ADMIN — Medication 317 MILLIGRAM(S): at 11:10

## 2023-02-07 RX ADMIN — PACLITAXEL 144 MILLIGRAM(S): 6 INJECTION, SOLUTION, CONCENTRATE INTRAVENOUS at 10:40

## 2023-02-07 RX ADMIN — PACLITAXEL 144 MILLIGRAM(S): 6 INJECTION, SOLUTION, CONCENTRATE INTRAVENOUS at 09:40

## 2023-02-14 ENCOUNTER — OUTPATIENT (OUTPATIENT)
Dept: OUTPATIENT SERVICES | Facility: HOSPITAL | Age: 48
LOS: 1 days | End: 2023-02-14
Payer: COMMERCIAL

## 2023-02-14 ENCOUNTER — LABORATORY RESULT (OUTPATIENT)
Age: 48
End: 2023-02-14

## 2023-02-14 ENCOUNTER — APPOINTMENT (OUTPATIENT)
Dept: INTERVENTIONAL RADIOLOGY/VASCULAR | Facility: HOSPITAL | Age: 48
End: 2023-02-14

## 2023-02-14 ENCOUNTER — APPOINTMENT (OUTPATIENT)
Dept: HEMATOLOGY ONCOLOGY | Facility: CLINIC | Age: 48
End: 2023-02-14
Payer: COMMERCIAL

## 2023-02-14 ENCOUNTER — APPOINTMENT (OUTPATIENT)
Dept: INFUSION THERAPY | Facility: CLINIC | Age: 48
End: 2023-02-14

## 2023-02-14 VITALS
TEMPERATURE: 98 F | HEART RATE: 78 BPM | WEIGHT: 175.93 LBS | RESPIRATION RATE: 18 BRPM | OXYGEN SATURATION: 98 % | SYSTOLIC BLOOD PRESSURE: 149 MMHG | DIASTOLIC BLOOD PRESSURE: 91 MMHG | HEIGHT: 62 IN

## 2023-02-14 VITALS
HEIGHT: 62 IN | WEIGHT: 176 LBS | HEART RATE: 78 BPM | DIASTOLIC BLOOD PRESSURE: 91 MMHG | SYSTOLIC BLOOD PRESSURE: 149 MMHG | RESPIRATION RATE: 18 BRPM | TEMPERATURE: 97.5 F | OXYGEN SATURATION: 98 % | BODY MASS INDEX: 32.39 KG/M2

## 2023-02-14 VITALS
HEART RATE: 83 BPM | SYSTOLIC BLOOD PRESSURE: 124 MMHG | RESPIRATION RATE: 18 BRPM | TEMPERATURE: 98 F | DIASTOLIC BLOOD PRESSURE: 80 MMHG | OXYGEN SATURATION: 100 %

## 2023-02-14 DIAGNOSIS — C50.912 MALIGNANT NEOPLASM OF UNSPECIFIED SITE OF LEFT FEMALE BREAST: ICD-10-CM

## 2023-02-14 LAB
ALBUMIN SERPL ELPH-MCNC: 3.6 G/DL
ALP BLD-CCNC: 61 U/L
ALT SERPL-CCNC: 20 U/L
ANION GAP SERPL CALC-SCNC: 5 MMOL/L
AST SERPL-CCNC: 23 U/L
BILIRUB SERPL-MCNC: 0.6 MG/DL
BUN SERPL-MCNC: 13 MG/DL
CALCIUM SERPL-MCNC: 9.2 MG/DL
CHLORIDE SERPL-SCNC: 108 MMOL/L
CO2 SERPL-SCNC: 29 MMOL/L
CREAT SERPL-MCNC: 1 MG/DL
EGFR: 71 ML/MIN/1.73M2
GLUCOSE SERPL-MCNC: 98 MG/DL
ISTAT TOTAL BETA HCG, PLASMA: <5 IU/L — SIGNIFICANT CHANGE UP
POTASSIUM SERPL-SCNC: 3.8 MMOL/L
PROT SERPL-MCNC: 7.5 G/DL
SODIUM SERPL-SCNC: 142 MMOL/L

## 2023-02-14 PROCEDURE — 84702 CHORIONIC GONADOTROPIN TEST: CPT

## 2023-02-14 PROCEDURE — 36415 COLL VENOUS BLD VENIPUNCTURE: CPT

## 2023-02-14 PROCEDURE — 96417 CHEMO IV INFUS EACH ADDL SEQ: CPT

## 2023-02-14 PROCEDURE — 96375 TX/PRO/DX INJ NEW DRUG ADDON: CPT

## 2023-02-14 PROCEDURE — 99214 OFFICE O/P EST MOD 30 MIN: CPT

## 2023-02-14 PROCEDURE — 96413 CHEMO IV INFUSION 1 HR: CPT

## 2023-02-14 RX ORDER — PACLITAXEL 6 MG/ML
144 INJECTION, SOLUTION, CONCENTRATE INTRAVENOUS ONCE
Refills: 0 | Status: COMPLETED | OUTPATIENT
Start: 2023-02-14 | End: 2023-02-14

## 2023-02-14 RX ORDER — PERTUZUMAB 30 MG/ML
420 INJECTION, SOLUTION, CONCENTRATE INTRAVENOUS ONCE
Refills: 0 | Status: COMPLETED | OUTPATIENT
Start: 2023-02-14 | End: 2023-02-14

## 2023-02-14 RX ORDER — TRASTUZUMAB-DKST 420 MG/20ML
486 INJECTION, POWDER, LYOPHILIZED, FOR SOLUTION INTRAVENOUS ONCE
Refills: 0 | Status: COMPLETED | OUTPATIENT
Start: 2023-02-14 | End: 2023-02-14

## 2023-02-14 RX ORDER — DIPHENHYDRAMINE HCL 50 MG
25 CAPSULE ORAL ONCE
Refills: 0 | Status: COMPLETED | OUTPATIENT
Start: 2023-02-14 | End: 2023-02-14

## 2023-02-14 RX ORDER — CARBOPLATIN 50 MG
317 VIAL (EA) INTRAVENOUS ONCE
Refills: 0 | Status: COMPLETED | OUTPATIENT
Start: 2023-02-14 | End: 2023-02-14

## 2023-02-14 RX ORDER — ACETAMINOPHEN 500 MG
650 TABLET ORAL ONCE
Refills: 0 | Status: COMPLETED | OUTPATIENT
Start: 2023-02-14 | End: 2023-02-14

## 2023-02-14 RX ORDER — DEXAMETHASONE 0.5 MG/5ML
4 ELIXIR ORAL ONCE
Refills: 0 | Status: COMPLETED | OUTPATIENT
Start: 2023-02-14 | End: 2023-02-14

## 2023-02-14 RX ORDER — PALONOSETRON HYDROCHLORIDE 0.25 MG/5ML
0.25 INJECTION, SOLUTION INTRAVENOUS ONCE
Refills: 0 | Status: COMPLETED | OUTPATIENT
Start: 2023-02-14 | End: 2023-02-14

## 2023-02-14 RX ORDER — FAMOTIDINE 10 MG/ML
20 INJECTION INTRAVENOUS ONCE
Refills: 0 | Status: COMPLETED | OUTPATIENT
Start: 2023-02-14 | End: 2023-02-14

## 2023-02-14 RX ADMIN — PACLITAXEL 144 MILLIGRAM(S): 6 INJECTION, SOLUTION, CONCENTRATE INTRAVENOUS at 14:11

## 2023-02-14 RX ADMIN — Medication 4 MILLIGRAM(S): at 12:25

## 2023-02-14 RX ADMIN — Medication 650 MILLIGRAM(S): at 11:17

## 2023-02-14 RX ADMIN — PERTUZUMAB 420 MILLIGRAM(S): 30 INJECTION, SOLUTION, CONCENTRATE INTRAVENOUS at 11:20

## 2023-02-14 RX ADMIN — Medication 25 MILLIGRAM(S): at 13:09

## 2023-02-14 RX ADMIN — Medication 317 MILLIGRAM(S): at 14:47

## 2023-02-14 RX ADMIN — Medication 202 MILLIGRAM(S): at 12:50

## 2023-02-14 RX ADMIN — TRASTUZUMAB-DKST 486 MILLIGRAM(S): 420 INJECTION, POWDER, LYOPHILIZED, FOR SOLUTION INTRAVENOUS at 11:20

## 2023-02-14 RX ADMIN — FAMOTIDINE 208 MILLIGRAM(S): 10 INJECTION INTRAVENOUS at 12:35

## 2023-02-14 RX ADMIN — PACLITAXEL 144 MILLIGRAM(S): 6 INJECTION, SOLUTION, CONCENTRATE INTRAVENOUS at 13:10

## 2023-02-14 RX ADMIN — Medication 204 MILLIGRAM(S): at 12:05

## 2023-02-14 RX ADMIN — PERTUZUMAB 420 MILLIGRAM(S): 30 INJECTION, SOLUTION, CONCENTRATE INTRAVENOUS at 11:50

## 2023-02-14 RX ADMIN — TRASTUZUMAB-DKST 486 MILLIGRAM(S): 420 INJECTION, POWDER, LYOPHILIZED, FOR SOLUTION INTRAVENOUS at 10:42

## 2023-02-14 RX ADMIN — PALONOSETRON HYDROCHLORIDE 0.25 MILLIGRAM(S): 0.25 INJECTION, SOLUTION INTRAVENOUS at 12:05

## 2023-02-14 RX ADMIN — Medication 317 MILLIGRAM(S): at 14:17

## 2023-02-14 RX ADMIN — FAMOTIDINE 20 MILLIGRAM(S): 10 INJECTION INTRAVENOUS at 12:50

## 2023-02-14 RX ADMIN — Medication 650 MILLIGRAM(S): at 12:06

## 2023-02-14 NOTE — HISTORY OF PRESENT ILLNESS
[Disease: _____________________] : Disease: [unfilled] [T: ___] : T[unfilled] [N: ___] : N[unfilled] [M: ___] : M[unfilled] [AJCC Stage: ____] : AJCC Stage: [unfilled] [Treatment Protocol] : Treatment Protocol [Cycle: ___] : Cycle: [unfilled] [Day: ___] : Day: [unfilled] [de-identified] : 46 y/o female with h/o left breast cancer who is on neoadjuvant TCHP since 1/23/23.\par \par PMHx:  HTN, multinodular goiter, followed by ENT, had 5 biopsies that were benign, last thyroid US last year.  Denies any compressive symptoms, such as dysphagia or dyspnea.  \par \par Patient initially experienced R breast heaviness which she describes as similar to menstrual symptoms in November 2022 prompting diagnostic workup that detected a left breast cancer, invasive ductal carcinoma, ER/[place] (-) HER-2 (+) with metastasis to lymph node. Left breast and axillary lymph node were both biopsied on 12/9/22.\par  \par Denies family history of breast or ovarian CA. Denies any history of breast surgeries or biopsies.\par \par 12/2/22 B/L dxMMG/US ([facility]): The left breast 0.6 x 0.5 x 0.5 cm indistinct irregular hypoechoic mass at 10:00 14 CMFN corresponds with mammography and is suspicious for malignancy. The 0.9 x 0.8 x 0.7 cm irregular complex cystic mass at 2:00 4 CMFN is indeterminate. The 1.8 x 1.2 cm abnormally thickened lymph node at 2:00 16 CMFN is suspicious for malignancy. No mammographic or sonographic evidence of malignancy in the right breast. BIRADS 5.\par \par 12/9/22 biopsy x 3:\par A. Left breast 2:00 4cmFN core biopsy: radial scar with calcifications, columnar cell change, cystic apocrine metaplasia.\par B. Left breast 2:00 16cm FN core biopsy: cortically thickened lymph node in the left axilla, metastatic carcinoma morphologically similar to part C.\par C. Left breast 10:00 14cm FN core biopsy: invasive moderately to poorly differentiated ductal carcinoma with micropapillary features, spanning at least 7 mm in this material.  Adjacent benign breast tissue with fibrocystic changes.  ER-, [place]-, HER2+ \par \par 12/30/22 MRI: \par 1. Left breast cancer 10:00 (14N) (buckle clip), newly diagnosed cancer.  Measures about 6 mm.\par 2. Left breast, 12:00 (15N) (near chest wall), there is an irregular 32 x 12 x 14 mm mass corresponding to the area of distortion seen on the recent mammogram.  There is no clip in this mass.  Recommend second look ultrasound and biopsy.  If not seen on ultrasound, recommend stereotactic biopsy.\par 3. Left 2:00 position, 4cm from  nipple (top hat shaped clip) there is an irregular 5 mm mass corresponding to the biopsy showing radial scar.\par 4. Left axillary adenopathy.  Corresponds to biopsy (cork clip) showing metastatic cancer.\par \par 1/6/23 PETCT: \par 1. Multiple hypermetabolic left breast lesions and hypermetabolic left axillary lymphadenopathy, which is consistent with breast malignancy with local colton spread.  No evidence of distant FDG avid metastatic disease.\par 2. Diffuse left-sided asymmetric enlargement of the thyroid gland with heterogeneous attenuation, typical of multinodular goiter.  There is also slight rightward tracheal deviation due to mass effect without tracheal narrowing.\par \par 1/10/23 ECHO: LVEF 65-70%\par \par 1/10/23 L breast US: Left breast 12:00 14 cm from the nipple, there is an irregular 33 x 7 x 15 mm mass corresponding to MRI finding.\par US guided L breast core biopsy: Invasive ductal carcinoma, poorly-differentiated, DCIS. Concordant malignant.\par \par 2/2/23 port placement. [de-identified] : moderately to poorly invasive ductal carcinoma [de-identified] : ER-, [place]- HER2+  [de-identified] : 12/27/22 ([facility]) 47 HBOC gene panel: negative for pathogenic mutations [FreeTextEntry1] : NEOADJUVANT weekly paclitaxel and carboplatin x 12 cycles in combination with Kanjinti and pertuzumab every 3 weeks [de-identified] : She c/o nausea 2 days after treatment, took ondansetron with relief.  She had diarrhea last week, up to 7-8 stools a day, did not take any antidiarrheals, has been repleting with water and Gatorade.  She is maintaining her hair with Dignicap.  Denies fever, chills, peripheral neuropathy, edema, chest pain, SOB, rash.

## 2023-02-21 ENCOUNTER — OUTPATIENT (OUTPATIENT)
Dept: OUTPATIENT SERVICES | Facility: HOSPITAL | Age: 48
LOS: 1 days | End: 2023-02-21
Payer: COMMERCIAL

## 2023-02-21 ENCOUNTER — APPOINTMENT (OUTPATIENT)
Dept: INFUSION THERAPY | Facility: CLINIC | Age: 48
End: 2023-02-21

## 2023-02-21 VITALS
HEIGHT: 62 IN | RESPIRATION RATE: 18 BRPM | HEART RATE: 72 BPM | WEIGHT: 177.03 LBS | SYSTOLIC BLOOD PRESSURE: 141 MMHG | OXYGEN SATURATION: 99 % | DIASTOLIC BLOOD PRESSURE: 89 MMHG | TEMPERATURE: 98 F

## 2023-02-21 DIAGNOSIS — C50.912 MALIGNANT NEOPLASM OF UNSPECIFIED SITE OF LEFT FEMALE BREAST: ICD-10-CM

## 2023-02-21 LAB
ALBUMIN SERPL ELPH-MCNC: 3.4 G/DL — SIGNIFICANT CHANGE UP (ref 3.3–5)
ALP SERPL-CCNC: 68 U/L — SIGNIFICANT CHANGE UP (ref 40–120)
ALT FLD-CCNC: 21 U/L — SIGNIFICANT CHANGE UP (ref 10–45)
ANION GAP SERPL CALC-SCNC: 5 MMOL/L — SIGNIFICANT CHANGE UP (ref 5–17)
AST SERPL-CCNC: 23 U/L — SIGNIFICANT CHANGE UP (ref 10–40)
BILIRUB SERPL-MCNC: 0.7 MG/DL — SIGNIFICANT CHANGE UP (ref 0.2–1.2)
BUN SERPL-MCNC: 10 MG/DL — SIGNIFICANT CHANGE UP (ref 7–23)
CALCIUM SERPL-MCNC: 9 MG/DL — SIGNIFICANT CHANGE UP (ref 8.4–10.5)
CHLORIDE SERPL-SCNC: 109 MMOL/L — HIGH (ref 96–108)
CO2 SERPL-SCNC: 29 MMOL/L — SIGNIFICANT CHANGE UP (ref 22–31)
CREAT SERPL-MCNC: 0.6 MG/DL — SIGNIFICANT CHANGE UP (ref 0.5–1.3)
EGFR: 113 ML/MIN/1.73M2 — SIGNIFICANT CHANGE UP
GLUCOSE SERPL-MCNC: 89 MG/DL — SIGNIFICANT CHANGE UP (ref 70–99)
HCT VFR BLD CALC: 27.8 % — LOW (ref 34.5–45)
HGB BLD-MCNC: 9.1 G/DL — LOW (ref 11.5–15.5)
ISTAT TOTAL BETA HCG, PLASMA: <5 IU/L — SIGNIFICANT CHANGE UP
LYMPHOCYTES # BLD AUTO: 2 K/UL — SIGNIFICANT CHANGE UP (ref 1–3.3)
LYMPHOCYTES # BLD AUTO: 53.5 % — HIGH (ref 13–44)
MCHC RBC-ENTMCNC: 28.8 PG — SIGNIFICANT CHANGE UP (ref 27–34)
MCHC RBC-ENTMCNC: 32.7 GM/DL — SIGNIFICANT CHANGE UP (ref 32–36)
MCV RBC AUTO: 88 FL — SIGNIFICANT CHANGE UP (ref 80–100)
NEUTROPHILS # BLD AUTO: 1.6 K/UL — LOW (ref 1.8–7.4)
NEUTROPHILS NFR BLD AUTO: 40.3 % — LOW (ref 43–77)
PLATELET # BLD AUTO: 215 K/UL — SIGNIFICANT CHANGE UP (ref 150–400)
POTASSIUM SERPL-MCNC: 4 MMOL/L — SIGNIFICANT CHANGE UP (ref 3.5–5.3)
POTASSIUM SERPL-SCNC: 4 MMOL/L — SIGNIFICANT CHANGE UP (ref 3.5–5.3)
PROT SERPL-MCNC: 7 G/DL — SIGNIFICANT CHANGE UP (ref 6–8.3)
RBC # BLD: 3.16 M/UL — LOW (ref 3.8–5.2)
RBC # FLD: 14.9 % — HIGH (ref 10.3–14.5)
SODIUM SERPL-SCNC: 143 MMOL/L — SIGNIFICANT CHANGE UP (ref 135–145)
WBC # BLD: 3.8 K/UL — SIGNIFICANT CHANGE UP (ref 3.8–10.5)
WBC # FLD AUTO: 3.8 K/UL — SIGNIFICANT CHANGE UP (ref 3.8–10.5)

## 2023-02-21 PROCEDURE — 80053 COMPREHEN METABOLIC PANEL: CPT

## 2023-02-21 PROCEDURE — 96417 CHEMO IV INFUS EACH ADDL SEQ: CPT

## 2023-02-21 PROCEDURE — 96413 CHEMO IV INFUSION 1 HR: CPT

## 2023-02-21 PROCEDURE — 84702 CHORIONIC GONADOTROPIN TEST: CPT

## 2023-02-21 PROCEDURE — 96375 TX/PRO/DX INJ NEW DRUG ADDON: CPT

## 2023-02-21 PROCEDURE — 85025 COMPLETE CBC W/AUTO DIFF WBC: CPT

## 2023-02-21 PROCEDURE — 36415 COLL VENOUS BLD VENIPUNCTURE: CPT

## 2023-02-21 RX ORDER — CARBOPLATIN 50 MG
317 VIAL (EA) INTRAVENOUS ONCE
Refills: 0 | Status: COMPLETED | OUTPATIENT
Start: 2023-02-21 | End: 2023-02-21

## 2023-02-21 RX ORDER — PACLITAXEL 6 MG/ML
144 INJECTION, SOLUTION, CONCENTRATE INTRAVENOUS ONCE
Refills: 0 | Status: COMPLETED | OUTPATIENT
Start: 2023-02-21 | End: 2023-02-21

## 2023-02-21 RX ADMIN — Medication 4 MILLIGRAM(S): at 13:15

## 2023-02-21 RX ADMIN — Medication 650 MILLIGRAM(S): at 12:55

## 2023-02-21 RX ADMIN — Medication 25 MILLIGRAM(S): at 12:45

## 2023-02-21 RX ADMIN — FAMOTIDINE 208 MILLIGRAM(S): 10 INJECTION INTRAVENOUS at 12:45

## 2023-02-21 RX ADMIN — FAMOTIDINE 20 MILLIGRAM(S): 10 INJECTION INTRAVENOUS at 13:00

## 2023-02-21 RX ADMIN — PALONOSETRON HYDROCHLORIDE 0.25 MILLIGRAM(S): 0.25 INJECTION, SOLUTION INTRAVENOUS at 12:28

## 2023-02-21 RX ADMIN — PACLITAXEL 144 MILLIGRAM(S): 6 INJECTION, SOLUTION, CONCENTRATE INTRAVENOUS at 13:15

## 2023-02-21 RX ADMIN — Medication 650 MILLIGRAM(S): at 12:25

## 2023-02-21 RX ADMIN — Medication 202 MILLIGRAM(S): at 12:30

## 2023-02-21 RX ADMIN — Medication 317 MILLIGRAM(S): at 14:50

## 2023-02-21 RX ADMIN — Medication 317 MILLIGRAM(S): at 14:20

## 2023-02-21 RX ADMIN — PACLITAXEL 144 MILLIGRAM(S): 6 INJECTION, SOLUTION, CONCENTRATE INTRAVENOUS at 14:15

## 2023-02-21 RX ADMIN — Medication 204 MILLIGRAM(S): at 13:00

## 2023-02-28 ENCOUNTER — APPOINTMENT (OUTPATIENT)
Dept: HEMATOLOGY ONCOLOGY | Facility: CLINIC | Age: 48
End: 2023-02-28
Payer: COMMERCIAL

## 2023-02-28 ENCOUNTER — LABORATORY RESULT (OUTPATIENT)
Age: 48
End: 2023-02-28

## 2023-02-28 ENCOUNTER — APPOINTMENT (OUTPATIENT)
Dept: INFUSION THERAPY | Facility: CLINIC | Age: 48
End: 2023-02-28

## 2023-02-28 ENCOUNTER — OUTPATIENT (OUTPATIENT)
Dept: OUTPATIENT SERVICES | Facility: HOSPITAL | Age: 48
LOS: 1 days | End: 2023-02-28
Payer: COMMERCIAL

## 2023-02-28 VITALS
TEMPERATURE: 98 F | OXYGEN SATURATION: 98 % | DIASTOLIC BLOOD PRESSURE: 89 MMHG | RESPIRATION RATE: 16 BRPM | SYSTOLIC BLOOD PRESSURE: 146 MMHG | WEIGHT: 175.05 LBS | HEIGHT: 62 IN | HEART RATE: 77 BPM

## 2023-02-28 VITALS
WEIGHT: 175 LBS | HEART RATE: 77 BPM | TEMPERATURE: 98 F | DIASTOLIC BLOOD PRESSURE: 89 MMHG | OXYGEN SATURATION: 99 % | HEIGHT: 62 IN | RESPIRATION RATE: 18 BRPM | SYSTOLIC BLOOD PRESSURE: 146 MMHG | BODY MASS INDEX: 32.2 KG/M2

## 2023-02-28 VITALS
RESPIRATION RATE: 17 BRPM | HEART RATE: 78 BPM | OXYGEN SATURATION: 99 % | DIASTOLIC BLOOD PRESSURE: 82 MMHG | TEMPERATURE: 98 F | SYSTOLIC BLOOD PRESSURE: 140 MMHG

## 2023-02-28 DIAGNOSIS — E87.6 HYPOKALEMIA: ICD-10-CM

## 2023-02-28 DIAGNOSIS — C50.912 MALIGNANT NEOPLASM OF UNSPECIFIED SITE OF LEFT FEMALE BREAST: ICD-10-CM

## 2023-02-28 LAB
ALBUMIN SERPL ELPH-MCNC: 3.3 G/DL
ALP BLD-CCNC: 71 U/L
ALT SERPL-CCNC: 21 U/L
ANION GAP SERPL CALC-SCNC: 16 MMOL/L
AST SERPL-CCNC: 23 U/L
BILIRUB SERPL-MCNC: 0.7 MG/DL
BUN SERPL-MCNC: 10 MG/DL
CALCIUM SERPL-MCNC: 9.3 MG/DL
CHLORIDE SERPL-SCNC: 102 MMOL/L
CO2 SERPL-SCNC: 27 MMOL/L
CREAT SERPL-MCNC: 0.9 MG/DL
EGFR: 80 ML/MIN/1.73M2
GLUCOSE SERPL-MCNC: 98 MG/DL
ISTAT TOTAL BETA HCG, PLASMA: <5 IU/L — SIGNIFICANT CHANGE UP
POTASSIUM SERPL-SCNC: 3.2 MMOL/L
PROT SERPL-MCNC: 7.3 G/DL
SODIUM SERPL-SCNC: 145 MMOL/L

## 2023-02-28 PROCEDURE — 84702 CHORIONIC GONADOTROPIN TEST: CPT

## 2023-02-28 PROCEDURE — 96375 TX/PRO/DX INJ NEW DRUG ADDON: CPT

## 2023-02-28 PROCEDURE — 36415 COLL VENOUS BLD VENIPUNCTURE: CPT

## 2023-02-28 PROCEDURE — 99214 OFFICE O/P EST MOD 30 MIN: CPT

## 2023-02-28 PROCEDURE — 96417 CHEMO IV INFUS EACH ADDL SEQ: CPT

## 2023-02-28 PROCEDURE — 96413 CHEMO IV INFUSION 1 HR: CPT

## 2023-02-28 RX ORDER — FAMOTIDINE 10 MG/ML
20 INJECTION INTRAVENOUS ONCE
Refills: 0 | Status: COMPLETED | OUTPATIENT
Start: 2023-02-28 | End: 2023-02-28

## 2023-02-28 RX ORDER — ACETAMINOPHEN 500 MG
650 TABLET ORAL ONCE
Refills: 0 | Status: COMPLETED | OUTPATIENT
Start: 2023-02-28 | End: 2023-02-28

## 2023-02-28 RX ORDER — PALONOSETRON HYDROCHLORIDE 0.25 MG/5ML
0.25 INJECTION, SOLUTION INTRAVENOUS ONCE
Refills: 0 | Status: COMPLETED | OUTPATIENT
Start: 2023-02-28 | End: 2023-02-28

## 2023-02-28 RX ORDER — DIPHENHYDRAMINE HCL 50 MG
25 CAPSULE ORAL ONCE
Refills: 0 | Status: COMPLETED | OUTPATIENT
Start: 2023-02-28 | End: 2023-02-28

## 2023-02-28 RX ORDER — PACLITAXEL 6 MG/ML
144 INJECTION, SOLUTION, CONCENTRATE INTRAVENOUS ONCE
Refills: 0 | Status: COMPLETED | OUTPATIENT
Start: 2023-02-28 | End: 2023-02-28

## 2023-02-28 RX ORDER — CARBOPLATIN 50 MG
317 VIAL (EA) INTRAVENOUS ONCE
Refills: 0 | Status: COMPLETED | OUTPATIENT
Start: 2023-02-28 | End: 2023-02-28

## 2023-02-28 RX ORDER — ERGOCALCIFEROL 1.25 MG/1
1.25 MG CAPSULE, LIQUID FILLED ORAL
Qty: 12 | Refills: 0 | Status: DISCONTINUED | COMMUNITY
Start: 2022-05-19 | End: 2023-02-28

## 2023-02-28 RX ORDER — DEXAMETHASONE 0.5 MG/5ML
4 ELIXIR ORAL ONCE
Refills: 0 | Status: COMPLETED | OUTPATIENT
Start: 2023-02-28 | End: 2023-02-28

## 2023-02-28 RX ADMIN — FAMOTIDINE 208 MILLIGRAM(S): 10 INJECTION INTRAVENOUS at 11:34

## 2023-02-28 RX ADMIN — Medication 4 MILLIGRAM(S): at 12:15

## 2023-02-28 RX ADMIN — PACLITAXEL 144 MILLIGRAM(S): 6 INJECTION, SOLUTION, CONCENTRATE INTRAVENOUS at 13:35

## 2023-02-28 RX ADMIN — Medication 25 MILLIGRAM(S): at 12:32

## 2023-02-28 RX ADMIN — PACLITAXEL 144 MILLIGRAM(S): 6 INJECTION, SOLUTION, CONCENTRATE INTRAVENOUS at 12:35

## 2023-02-28 RX ADMIN — Medication 650 MILLIGRAM(S): at 11:40

## 2023-02-28 RX ADMIN — FAMOTIDINE 20 MILLIGRAM(S): 10 INJECTION INTRAVENOUS at 11:50

## 2023-02-28 RX ADMIN — Medication 202 MILLIGRAM(S): at 12:16

## 2023-02-28 RX ADMIN — Medication 317 MILLIGRAM(S): at 13:40

## 2023-02-28 RX ADMIN — PALONOSETRON HYDROCHLORIDE 0.25 MILLIGRAM(S): 0.25 INJECTION, SOLUTION INTRAVENOUS at 11:52

## 2023-02-28 RX ADMIN — Medication 204 MILLIGRAM(S): at 12:00

## 2023-02-28 RX ADMIN — Medication 317 MILLIGRAM(S): at 14:10

## 2023-02-28 RX ADMIN — Medication 650 MILLIGRAM(S): at 11:36

## 2023-02-28 NOTE — HISTORY OF PRESENT ILLNESS
[Disease: _____________________] : Disease: [unfilled] [T: ___] : T[unfilled] [N: ___] : N[unfilled] [M: ___] : M[unfilled] [AJCC Stage: ____] : AJCC Stage: [unfilled] [Treatment Protocol] : Treatment Protocol [Cycle: ___] : Cycle: [unfilled] [Day: ___] : Day: [unfilled] [de-identified] : 48 y/o female with h/o left breast cancer who is on neoadjuvant TCHP since 1/23/23.\par \par PMHx:  HTN, multinodular goiter, followed by ENT, had 5 biopsies that were benign, last thyroid US last year.  Denies any compressive symptoms, such as dysphagia or dyspnea.  \par \par Patient initially experienced R breast heaviness which she describes as similar to menstrual symptoms in November 2022 prompting diagnostic workup that detected a left breast cancer, invasive ductal carcinoma, ER/[place] (-) HER-2 (+) with metastasis to lymph node. Left breast and axillary lymph node were both biopsied on 12/9/22.\par  \par Denies family history of breast or ovarian CA. Denies any history of breast surgeries or biopsies.\par \par 12/2/22 B/L dxMMG/US ([facility]): The left breast 0.6 x 0.5 x 0.5 cm indistinct irregular hypoechoic mass at 10:00 14 CMFN corresponds with mammography and is suspicious for malignancy. The 0.9 x 0.8 x 0.7 cm irregular complex cystic mass at 2:00 4 CMFN is indeterminate. The 1.8 x 1.2 cm abnormally thickened lymph node at 2:00 16 CMFN is suspicious for malignancy. No mammographic or sonographic evidence of malignancy in the right breast. BIRADS 5.\par \par 12/9/22 biopsy x 3:\par A. Left breast 2:00 4cmFN core biopsy: radial scar with calcifications, columnar cell change, cystic apocrine metaplasia.\par B. Left breast 2:00 16cm FN core biopsy: cortically thickened lymph node in the left axilla, metastatic carcinoma morphologically similar to part C.\par C. Left breast 10:00 14cm FN core biopsy: invasive moderately to poorly differentiated ductal carcinoma with micropapillary features, spanning at least 7 mm in this material.  Adjacent benign breast tissue with fibrocystic changes.  ER-, [place]-, HER2+ \par \par 12/30/22 MRI: \par 1. Left breast cancer 10:00 (14N) (buckle clip), newly diagnosed cancer.  Measures about 6 mm.\par 2. Left breast, 12:00 (15N) (near chest wall), there is an irregular 32 x 12 x 14 mm mass corresponding to the area of distortion seen on the recent mammogram.  There is no clip in this mass.  Recommend second look ultrasound and biopsy.  If not seen on ultrasound, recommend stereotactic biopsy.\par 3. Left 2:00 position, 4cm from  nipple (top hat shaped clip) there is an irregular 5 mm mass corresponding to the biopsy showing radial scar.\par 4. Left axillary adenopathy.  Corresponds to biopsy (cork clip) showing metastatic cancer.\par \par 1/6/23 PETCT: \par 1. Multiple hypermetabolic left breast lesions and hypermetabolic left axillary lymphadenopathy, which is consistent with breast malignancy with local colton spread.  No evidence of distant FDG avid metastatic disease.\par 2. Diffuse left-sided asymmetric enlargement of the thyroid gland with heterogeneous attenuation, typical of multinodular goiter.  There is also slight rightward tracheal deviation due to mass effect without tracheal narrowing.\par \par 1/10/23 ECHO: LVEF 65-70%\par \par 1/10/23 L breast US: Left breast 12:00 14 cm from the nipple, there is an irregular 33 x 7 x 15 mm mass corresponding to MRI finding.\par US guided L breast core biopsy: Invasive ductal carcinoma, poorly-differentiated, DCIS. Concordant malignant.\par \par 2/2/23 port placement. [de-identified] : moderately to poorly invasive ductal carcinoma [de-identified] : ER-, [place]- HER2+  [de-identified] : 12/27/22 ([facility]) 47 HBOC gene panel: negative for pathogenic mutations [FreeTextEntry1] : NEOADJUVANT weekly paclitaxel and carboplatin x 12 cycles in combination with Kanjinti and pertuzumab every 3 weeks [de-identified] : Patient is tolerating treatment well.  Has 1 watery stool a day which is resolved with Imodium.  She is eating and drinking well, but reports some smells and tastes are offputting.  She feels occasional discomfort in the left axilla, has difficulty finding comfortable sleeping position due to port.  She continues to work remotely during treatment.  Energy is slightly lower than baseline but does not interfere with ADLs.  Good hair retention with Dignicap.  She has not felt any breast masses.

## 2023-02-28 NOTE — REASON FOR VISIT
[Follow-Up Visit] : a follow-up [Spouse] : spouse [FreeTextEntry2] : Left breast cancer with lymph node metastasis

## 2023-02-28 NOTE — REVIEW OF SYSTEMS
[Diarrhea] : diarrhea [Negative] : Allergic/Immunologic [Fever] : no fever [Chills] : no chills [Fatigue] : no fatigue [Recent Change In Weight] : ~T no recent weight change [Chest Pain] : no chest pain [Lower Ext Edema] : no lower extremity edema [Shortness Of Breath] : no shortness of breath [SOB on Exertion] : no shortness of breath during exertion [Abdominal Pain] : no abdominal pain [Vomiting] : no vomiting [FreeTextEntry2] : m [de-identified] : hyperpigmentation of skin under left breast from excessive sweating [de-identified] : no peripheral neuropathy

## 2023-02-28 NOTE — PHYSICAL EXAM
[Normal] : affect appropriate [de-identified] : mediport in right chest wall, nontender, clean, dry, no erythema or edema [de-identified] : no palpable left breast mass or axillary mass

## 2023-02-28 NOTE — PAST MEDICAL HISTORY
[Menstruating] : The patient is menstruating [Menarche Age ____] : age at menarche was [unfilled] [Definite ___ (Date)] : the last menstrual period was [unfilled] [Total Preg ___] : G[unfilled] [Live Births ___] : P[unfilled]  [Premature ___] : Premature: [unfilled] [Age At Live Birth ___] : Age at live birth: [unfilled] [History of Hormone Replacement Treatment] : has no history of hormone replacement treatment [FreeTextEntry6] : no [FreeTextEntry7] : no [FreeTextEntry8] : yes

## 2023-03-07 ENCOUNTER — APPOINTMENT (OUTPATIENT)
Dept: INFUSION THERAPY | Facility: CLINIC | Age: 48
End: 2023-03-07

## 2023-03-07 ENCOUNTER — NON-APPOINTMENT (OUTPATIENT)
Age: 48
End: 2023-03-07

## 2023-03-07 ENCOUNTER — OUTPATIENT (OUTPATIENT)
Dept: OUTPATIENT SERVICES | Facility: HOSPITAL | Age: 48
LOS: 1 days | End: 2023-03-07
Payer: COMMERCIAL

## 2023-03-07 VITALS
RESPIRATION RATE: 18 BRPM | SYSTOLIC BLOOD PRESSURE: 136 MMHG | HEART RATE: 73 BPM | HEIGHT: 62 IN | WEIGHT: 175.93 LBS | TEMPERATURE: 99 F | OXYGEN SATURATION: 96 % | DIASTOLIC BLOOD PRESSURE: 88 MMHG

## 2023-03-07 VITALS
DIASTOLIC BLOOD PRESSURE: 76 MMHG | SYSTOLIC BLOOD PRESSURE: 126 MMHG | HEART RATE: 73 BPM | TEMPERATURE: 99 F | OXYGEN SATURATION: 98 % | RESPIRATION RATE: 18 BRPM

## 2023-03-07 DIAGNOSIS — C50.912 MALIGNANT NEOPLASM OF UNSPECIFIED SITE OF LEFT FEMALE BREAST: ICD-10-CM

## 2023-03-07 LAB
ALBUMIN SERPL ELPH-MCNC: 3.7 G/DL — SIGNIFICANT CHANGE UP (ref 3.3–5)
ALP SERPL-CCNC: 70 U/L — SIGNIFICANT CHANGE UP (ref 40–120)
ALT FLD-CCNC: 25 U/L — SIGNIFICANT CHANGE UP (ref 10–45)
ANION GAP SERPL CALC-SCNC: 6 MMOL/L — SIGNIFICANT CHANGE UP (ref 5–17)
AST SERPL-CCNC: 27 U/L — SIGNIFICANT CHANGE UP (ref 10–40)
BILIRUB SERPL-MCNC: 0.9 MG/DL — SIGNIFICANT CHANGE UP (ref 0.2–1.2)
BUN SERPL-MCNC: 8 MG/DL — SIGNIFICANT CHANGE UP (ref 7–23)
CALCIUM SERPL-MCNC: 9.5 MG/DL — SIGNIFICANT CHANGE UP (ref 8.4–10.5)
CHLORIDE SERPL-SCNC: 108 MMOL/L — SIGNIFICANT CHANGE UP (ref 96–108)
CO2 SERPL-SCNC: 29 MMOL/L — SIGNIFICANT CHANGE UP (ref 22–31)
CREAT SERPL-MCNC: 0.7 MG/DL — SIGNIFICANT CHANGE UP (ref 0.5–1.3)
EGFR: 109 ML/MIN/1.73M2 — SIGNIFICANT CHANGE UP
GLUCOSE SERPL-MCNC: 99 MG/DL — SIGNIFICANT CHANGE UP (ref 70–99)
HCT VFR BLD CALC: 28 % — LOW (ref 34.5–45)
HGB BLD-MCNC: 9.3 G/DL — LOW (ref 11.5–15.5)
ISTAT TOTAL BETA HCG, PLASMA: <5 IU/L — SIGNIFICANT CHANGE UP
LYMPHOCYTES # BLD AUTO: 1.9 K/UL — SIGNIFICANT CHANGE UP (ref 1–3.3)
LYMPHOCYTES # BLD AUTO: 59.1 % — HIGH (ref 13–44)
MCHC RBC-ENTMCNC: 29.5 PG — SIGNIFICANT CHANGE UP (ref 27–34)
MCHC RBC-ENTMCNC: 33.2 GM/DL — SIGNIFICANT CHANGE UP (ref 32–36)
MCV RBC AUTO: 88.9 FL — SIGNIFICANT CHANGE UP (ref 80–100)
NEUTROPHILS # BLD AUTO: 1.1 K/UL — LOW (ref 1.8–7.4)
NEUTROPHILS NFR BLD AUTO: 34.5 % — LOW (ref 43–77)
PLATELET # BLD AUTO: 165 K/UL — SIGNIFICANT CHANGE UP (ref 150–400)
POTASSIUM SERPL-MCNC: 4 MMOL/L — SIGNIFICANT CHANGE UP (ref 3.5–5.3)
POTASSIUM SERPL-SCNC: 4 MMOL/L — SIGNIFICANT CHANGE UP (ref 3.5–5.3)
PROT SERPL-MCNC: 7.3 G/DL — SIGNIFICANT CHANGE UP (ref 6–8.3)
RBC # BLD: 3.15 M/UL — LOW (ref 3.8–5.2)
RBC # FLD: 16.2 % — HIGH (ref 10.3–14.5)
SODIUM SERPL-SCNC: 143 MMOL/L — SIGNIFICANT CHANGE UP (ref 135–145)
WBC # BLD: 3.2 K/UL — LOW (ref 3.8–10.5)
WBC # FLD AUTO: 3.2 K/UL — LOW (ref 3.8–10.5)

## 2023-03-07 PROCEDURE — 80053 COMPREHEN METABOLIC PANEL: CPT

## 2023-03-07 PROCEDURE — 96417 CHEMO IV INFUS EACH ADDL SEQ: CPT

## 2023-03-07 PROCEDURE — 96375 TX/PRO/DX INJ NEW DRUG ADDON: CPT

## 2023-03-07 PROCEDURE — 96413 CHEMO IV INFUSION 1 HR: CPT

## 2023-03-07 PROCEDURE — 85025 COMPLETE CBC W/AUTO DIFF WBC: CPT

## 2023-03-07 PROCEDURE — 36415 COLL VENOUS BLD VENIPUNCTURE: CPT

## 2023-03-07 PROCEDURE — 84702 CHORIONIC GONADOTROPIN TEST: CPT

## 2023-03-07 RX ORDER — PACLITAXEL 6 MG/ML
144 INJECTION, SOLUTION, CONCENTRATE INTRAVENOUS ONCE
Refills: 0 | Status: COMPLETED | OUTPATIENT
Start: 2023-03-07 | End: 2023-03-07

## 2023-03-07 RX ORDER — TRASTUZUMAB-DKST 420 MG/20ML
486 INJECTION, POWDER, LYOPHILIZED, FOR SOLUTION INTRAVENOUS ONCE
Refills: 0 | Status: COMPLETED | OUTPATIENT
Start: 2023-03-07 | End: 2023-03-07

## 2023-03-07 RX ORDER — PERTUZUMAB 30 MG/ML
420 INJECTION, SOLUTION, CONCENTRATE INTRAVENOUS ONCE
Refills: 0 | Status: COMPLETED | OUTPATIENT
Start: 2023-03-07 | End: 2023-03-07

## 2023-03-07 RX ORDER — FAMOTIDINE 10 MG/ML
20 INJECTION INTRAVENOUS ONCE
Refills: 0 | Status: COMPLETED | OUTPATIENT
Start: 2023-03-07 | End: 2023-03-07

## 2023-03-07 RX ORDER — DIPHENHYDRAMINE HCL 50 MG
25 CAPSULE ORAL ONCE
Refills: 0 | Status: COMPLETED | OUTPATIENT
Start: 2023-03-07 | End: 2023-03-07

## 2023-03-07 RX ORDER — PALONOSETRON HYDROCHLORIDE 0.25 MG/5ML
0.25 INJECTION, SOLUTION INTRAVENOUS ONCE
Refills: 0 | Status: DISCONTINUED | OUTPATIENT
Start: 2023-03-07 | End: 2023-03-07

## 2023-03-07 RX ORDER — DEXAMETHASONE 0.5 MG/5ML
4 ELIXIR ORAL ONCE
Refills: 0 | Status: COMPLETED | OUTPATIENT
Start: 2023-03-07 | End: 2023-03-07

## 2023-03-07 RX ORDER — CARBOPLATIN 50 MG
317 VIAL (EA) INTRAVENOUS ONCE
Refills: 0 | Status: DISCONTINUED | OUTPATIENT
Start: 2023-03-07 | End: 2023-03-07

## 2023-03-07 RX ADMIN — PERTUZUMAB 420 MILLIGRAM(S): 30 INJECTION, SOLUTION, CONCENTRATE INTRAVENOUS at 10:50

## 2023-03-07 RX ADMIN — TRASTUZUMAB-DKST 486 MILLIGRAM(S): 420 INJECTION, POWDER, LYOPHILIZED, FOR SOLUTION INTRAVENOUS at 10:15

## 2023-03-07 RX ADMIN — Medication 25 MILLIGRAM(S): at 12:00

## 2023-03-07 RX ADMIN — FAMOTIDINE 20 MILLIGRAM(S): 10 INJECTION INTRAVENOUS at 12:15

## 2023-03-07 RX ADMIN — PACLITAXEL 144 MILLIGRAM(S): 6 INJECTION, SOLUTION, CONCENTRATE INTRAVENOUS at 12:30

## 2023-03-07 RX ADMIN — FAMOTIDINE 208 MILLIGRAM(S): 10 INJECTION INTRAVENOUS at 12:00

## 2023-03-07 RX ADMIN — Medication 202 MILLIGRAM(S): at 11:45

## 2023-03-07 RX ADMIN — TRASTUZUMAB-DKST 486 MILLIGRAM(S): 420 INJECTION, POWDER, LYOPHILIZED, FOR SOLUTION INTRAVENOUS at 10:45

## 2023-03-07 RX ADMIN — PACLITAXEL 144 MILLIGRAM(S): 6 INJECTION, SOLUTION, CONCENTRATE INTRAVENOUS at 13:30

## 2023-03-07 RX ADMIN — Medication 4 MILLIGRAM(S): at 11:45

## 2023-03-07 RX ADMIN — Medication 204 MILLIGRAM(S): at 11:30

## 2023-03-07 RX ADMIN — Medication 650 MILLIGRAM(S): at 11:30

## 2023-03-07 RX ADMIN — PERTUZUMAB 420 MILLIGRAM(S): 30 INJECTION, SOLUTION, CONCENTRATE INTRAVENOUS at 11:20

## 2023-03-07 RX ADMIN — Medication 650 MILLIGRAM(S): at 12:00

## 2023-03-14 ENCOUNTER — NON-APPOINTMENT (OUTPATIENT)
Age: 48
End: 2023-03-14

## 2023-03-14 ENCOUNTER — LABORATORY RESULT (OUTPATIENT)
Age: 48
End: 2023-03-14

## 2023-03-14 ENCOUNTER — APPOINTMENT (OUTPATIENT)
Dept: INFUSION THERAPY | Facility: CLINIC | Age: 48
End: 2023-03-14

## 2023-03-14 ENCOUNTER — OUTPATIENT (OUTPATIENT)
Dept: OUTPATIENT SERVICES | Facility: HOSPITAL | Age: 48
LOS: 1 days | End: 2023-03-14
Payer: COMMERCIAL

## 2023-03-14 ENCOUNTER — APPOINTMENT (OUTPATIENT)
Dept: HEMATOLOGY ONCOLOGY | Facility: CLINIC | Age: 48
End: 2023-03-14
Payer: COMMERCIAL

## 2023-03-14 VITALS
WEIGHT: 175.05 LBS | SYSTOLIC BLOOD PRESSURE: 144 MMHG | OXYGEN SATURATION: 99 % | HEIGHT: 62 IN | HEART RATE: 79 BPM | TEMPERATURE: 98 F | DIASTOLIC BLOOD PRESSURE: 88 MMHG | RESPIRATION RATE: 17 BRPM

## 2023-03-14 VITALS
WEIGHT: 175 LBS | OXYGEN SATURATION: 99 % | TEMPERATURE: 98.3 F | DIASTOLIC BLOOD PRESSURE: 95 MMHG | HEART RATE: 79 BPM | SYSTOLIC BLOOD PRESSURE: 144 MMHG | BODY MASS INDEX: 32.2 KG/M2 | RESPIRATION RATE: 18 BRPM | HEIGHT: 62 IN

## 2023-03-14 VITALS
HEART RATE: 86 BPM | RESPIRATION RATE: 17 BRPM | TEMPERATURE: 97 F | OXYGEN SATURATION: 96 % | DIASTOLIC BLOOD PRESSURE: 72 MMHG | SYSTOLIC BLOOD PRESSURE: 142 MMHG

## 2023-03-14 DIAGNOSIS — C50.912 MALIGNANT NEOPLASM OF UNSPECIFIED SITE OF LEFT FEMALE BREAST: ICD-10-CM

## 2023-03-14 LAB
ALBUMIN SERPL ELPH-MCNC: 3.3 G/DL
ALP BLD-CCNC: 71 U/L
ALT SERPL-CCNC: 24 U/L
ANION GAP SERPL CALC-SCNC: 7 MMOL/L
AST SERPL-CCNC: 27 U/L
BILIRUB SERPL-MCNC: 0.7 MG/DL
BUN SERPL-MCNC: 11 MG/DL
CALCIUM SERPL-MCNC: 9.1 MG/DL
CHLORIDE SERPL-SCNC: 108 MMOL/L
CO2 SERPL-SCNC: 28 MMOL/L
CREAT SERPL-MCNC: 0.7 MG/DL
EGFR: 109 ML/MIN/1.73M2
GLUCOSE SERPL-MCNC: 100 MG/DL
ISTAT TOTAL BETA HCG, PLASMA: <5 IU/L — SIGNIFICANT CHANGE UP
POTASSIUM SERPL-SCNC: 3.7 MMOL/L
PROT SERPL-MCNC: 6.9 G/DL
SODIUM SERPL-SCNC: 143 MMOL/L

## 2023-03-14 PROCEDURE — 96375 TX/PRO/DX INJ NEW DRUG ADDON: CPT

## 2023-03-14 PROCEDURE — 96413 CHEMO IV INFUSION 1 HR: CPT

## 2023-03-14 PROCEDURE — 99214 OFFICE O/P EST MOD 30 MIN: CPT

## 2023-03-14 PROCEDURE — 36415 COLL VENOUS BLD VENIPUNCTURE: CPT

## 2023-03-14 PROCEDURE — 84702 CHORIONIC GONADOTROPIN TEST: CPT

## 2023-03-14 RX ORDER — PACLITAXEL 6 MG/ML
144 INJECTION, SOLUTION, CONCENTRATE INTRAVENOUS ONCE
Refills: 0 | Status: COMPLETED | OUTPATIENT
Start: 2023-03-14 | End: 2023-03-14

## 2023-03-14 RX ADMIN — Medication 650 MILLIGRAM(S): at 12:41

## 2023-03-14 RX ADMIN — Medication 4 MILLIGRAM(S): at 13:15

## 2023-03-14 RX ADMIN — PACLITAXEL 144 MILLIGRAM(S): 6 INJECTION, SOLUTION, CONCENTRATE INTRAVENOUS at 13:56

## 2023-03-14 RX ADMIN — Medication 204 MILLIGRAM(S): at 13:00

## 2023-03-14 RX ADMIN — Medication 650 MILLIGRAM(S): at 14:42

## 2023-03-14 RX ADMIN — FAMOTIDINE 208 MILLIGRAM(S): 10 INJECTION INTRAVENOUS at 12:40

## 2023-03-14 RX ADMIN — PACLITAXEL 144 MILLIGRAM(S): 6 INJECTION, SOLUTION, CONCENTRATE INTRAVENOUS at 15:00

## 2023-03-14 RX ADMIN — Medication 202 MILLIGRAM(S): at 13:20

## 2023-03-14 RX ADMIN — FAMOTIDINE 20 MILLIGRAM(S): 10 INJECTION INTRAVENOUS at 12:55

## 2023-03-14 RX ADMIN — Medication 25 MILLIGRAM(S): at 13:35

## 2023-03-14 NOTE — HISTORY OF PRESENT ILLNESS
[Disease: _____________________] : Disease: [unfilled] [T: ___] : T[unfilled] [N: ___] : N[unfilled] [M: ___] : M[unfilled] [AJCC Stage: ____] : AJCC Stage: [unfilled] [Treatment Protocol] : Treatment Protocol [Cycle: ___] : Cycle: [unfilled] [Day: ___] : Day: [unfilled] [de-identified] : 48 y/o female with h/o left breast cancer who is on neoadjuvant TCHP since 1/23/23.\par \par PMHx:  HTN, multinodular goiter, followed by ENT, had 5 biopsies that were benign, last thyroid US last year.  Denies any compressive symptoms, such as dysphagia or dyspnea.  \par \par Patient initially experienced R breast heaviness which she describes as similar to menstrual symptoms in November 2022 prompting diagnostic workup that detected a left breast cancer, invasive ductal carcinoma, ER/DC (-) HER-2 (+) with metastasis to lymph node. Left breast and axillary lymph node were both biopsied on 12/9/22.\par  \par Denies family history of breast or ovarian CA. Denies any history of breast surgeries or biopsies.\par \par 12/2/22 B/L dxMMG/US ([facility]): The left breast 0.6 x 0.5 x 0.5 cm indistinct irregular hypoechoic mass at 10:00 14 CMFN corresponds with mammography and is suspicious for malignancy. The 0.9 x 0.8 x 0.7 cm irregular complex cystic mass at 2:00 4 CMFN is indeterminate. The 1.8 x 1.2 cm abnormally thickened lymph node at 2:00 16 CMFN is suspicious for malignancy. No mammographic or sonographic evidence of malignancy in the right breast. BIRADS 5.\par \par 12/9/22 biopsy x 3:\par A. Left breast 2:00 4cmFN core biopsy: radial scar with calcifications, columnar cell change, cystic apocrine metaplasia.\par B. Left breast 2:00 16cm FN core biopsy: cortically thickened lymph node in the left axilla, metastatic carcinoma morphologically similar to part C.\par C. Left breast 10:00 14cm FN core biopsy: invasive moderately to poorly differentiated ductal carcinoma with micropapillary features, spanning at least 7 mm in this material.  Adjacent benign breast tissue with fibrocystic changes.  ER-, DC-, HER2+ \par \par 12/30/22 MRI: \par 1. Left breast cancer 10:00 (14N) (buckle clip), newly diagnosed cancer.  Measures about 6 mm.\par 2. Left breast, 12:00 (15N) (near chest wall), there is an irregular 32 x 12 x 14 mm mass corresponding to the area of distortion seen on the recent mammogram.  There is no clip in this mass.  Recommend second look ultrasound and biopsy.  If not seen on ultrasound, recommend stereotactic biopsy.\par 3. Left 2:00 position, 4cm from  nipple (top hat shaped clip) there is an irregular 5 mm mass corresponding to the biopsy showing radial scar.\par 4. Left axillary adenopathy.  Corresponds to biopsy (cork clip) showing metastatic cancer.\par \par 1/6/23 PETCT: \par 1. Multiple hypermetabolic left breast lesions and hypermetabolic left axillary lymphadenopathy, which is consistent with breast malignancy with local colton spread.  No evidence of distant FDG avid metastatic disease.\par 2. Diffuse left-sided asymmetric enlargement of the thyroid gland with heterogeneous attenuation, typical of multinodular goiter.  There is also slight rightward tracheal deviation due to mass effect without tracheal narrowing.\par \par 1/10/23 ECHO: LVEF 65-70%\par \par 1/10/23 L breast US: Left breast 12:00 14 cm from the nipple, there is an irregular 33 x 7 x 15 mm mass corresponding to MRI finding.\par US guided L breast core biopsy: Invasive ductal carcinoma, poorly-differentiated, DCIS. Concordant malignant.\par \par 2/2/23 port placement. [de-identified] : moderately to poorly invasive ductal carcinoma [de-identified] : ER-, [place]- HER2+  [de-identified] : 12/27/22 ([facility]) 47 HBOC gene panel: negative for pathogenic mutations [FreeTextEntry1] : NEOADJUVANT weekly paclitaxel and carboplatin x 12 cycles in combination with Kanjinti and pertuzumab every 3 weeks\par 3/7/23 - carboplatin discontinued due to neutropenia. [de-identified] : Carboplatin was discontinued last week due to downtrending ANC to 1.1.

## 2023-03-21 ENCOUNTER — OUTPATIENT (OUTPATIENT)
Dept: OUTPATIENT SERVICES | Facility: HOSPITAL | Age: 48
LOS: 1 days | End: 2023-03-21
Payer: COMMERCIAL

## 2023-03-21 ENCOUNTER — APPOINTMENT (OUTPATIENT)
Dept: INFUSION THERAPY | Facility: CLINIC | Age: 48
End: 2023-03-21

## 2023-03-21 VITALS
TEMPERATURE: 98 F | OXYGEN SATURATION: 99 % | SYSTOLIC BLOOD PRESSURE: 126 MMHG | WEIGHT: 172.18 LBS | DIASTOLIC BLOOD PRESSURE: 80 MMHG | HEART RATE: 86 BPM | HEIGHT: 62 IN | RESPIRATION RATE: 16 BRPM

## 2023-03-21 DIAGNOSIS — C50.912 MALIGNANT NEOPLASM OF UNSPECIFIED SITE OF LEFT FEMALE BREAST: ICD-10-CM

## 2023-03-21 LAB
ALBUMIN SERPL ELPH-MCNC: 3.3 G/DL — SIGNIFICANT CHANGE UP (ref 3.3–5)
ALP SERPL-CCNC: 74 U/L — SIGNIFICANT CHANGE UP (ref 40–120)
ALT FLD-CCNC: 33 U/L — SIGNIFICANT CHANGE UP (ref 10–45)
ANION GAP SERPL CALC-SCNC: 11 MMOL/L — SIGNIFICANT CHANGE UP (ref 5–17)
ANISOCYTOSIS BLD QL: SLIGHT — SIGNIFICANT CHANGE UP
AST SERPL-CCNC: 36 U/L — SIGNIFICANT CHANGE UP (ref 10–40)
BASOPHILS # BLD AUTO: 0 K/UL — SIGNIFICANT CHANGE UP (ref 0–0.2)
BASOPHILS NFR BLD AUTO: 0 % — SIGNIFICANT CHANGE UP (ref 0–2)
BILIRUB SERPL-MCNC: 0.7 MG/DL — SIGNIFICANT CHANGE UP (ref 0.2–1.2)
BUN SERPL-MCNC: 10 MG/DL — SIGNIFICANT CHANGE UP (ref 7–23)
CALCIUM SERPL-MCNC: 9.5 MG/DL — SIGNIFICANT CHANGE UP (ref 8.4–10.5)
CHLORIDE SERPL-SCNC: 105 MMOL/L — SIGNIFICANT CHANGE UP (ref 96–108)
CO2 SERPL-SCNC: 26 MMOL/L — SIGNIFICANT CHANGE UP (ref 22–31)
CREAT SERPL-MCNC: 0.6 MG/DL — SIGNIFICANT CHANGE UP (ref 0.5–1.3)
EGFR: 113 ML/MIN/1.73M2 — SIGNIFICANT CHANGE UP
EOSINOPHIL # BLD AUTO: 0.03 K/UL — SIGNIFICANT CHANGE UP (ref 0–0.5)
EOSINOPHIL NFR BLD AUTO: 0.9 % — SIGNIFICANT CHANGE UP (ref 0–6)
GLUCOSE SERPL-MCNC: 130 MG/DL — HIGH (ref 70–99)
HCT VFR BLD CALC: 28 % — LOW (ref 34.5–45)
HGB BLD-MCNC: 9.3 G/DL — LOW (ref 11.5–15.5)
HYPOCHROMIA BLD QL: SLIGHT — SIGNIFICANT CHANGE UP
ISTAT TOTAL BETA HCG, PLASMA: <5 IU/L — SIGNIFICANT CHANGE UP
LYMPHOCYTES # BLD AUTO: 1.95 K/UL — SIGNIFICANT CHANGE UP (ref 1–3.3)
LYMPHOCYTES # BLD AUTO: 64.9 % — HIGH (ref 13–44)
MACROCYTES BLD QL: SLIGHT — SIGNIFICANT CHANGE UP
MANUAL SMEAR VERIFICATION: SIGNIFICANT CHANGE UP
MCHC RBC-ENTMCNC: 30.7 PG — SIGNIFICANT CHANGE UP (ref 27–34)
MCHC RBC-ENTMCNC: 33.2 GM/DL — SIGNIFICANT CHANGE UP (ref 32–36)
MCV RBC AUTO: 92.4 FL — SIGNIFICANT CHANGE UP (ref 80–100)
MONOCYTES # BLD AUTO: 0.16 K/UL — SIGNIFICANT CHANGE UP (ref 0–0.9)
MONOCYTES NFR BLD AUTO: 5.3 % — SIGNIFICANT CHANGE UP (ref 2–14)
NEUTROPHILS # BLD AUTO: 0.87 K/UL — LOW (ref 1.8–7.4)
NEUTROPHILS NFR BLD AUTO: 28.9 % — LOW (ref 43–77)
OVALOCYTES BLD QL SMEAR: SLIGHT — SIGNIFICANT CHANGE UP
PLAT MORPH BLD: NORMAL — SIGNIFICANT CHANGE UP
PLATELET # BLD AUTO: 243 K/UL — SIGNIFICANT CHANGE UP (ref 150–400)
POIKILOCYTOSIS BLD QL AUTO: SLIGHT — SIGNIFICANT CHANGE UP
POLYCHROMASIA BLD QL SMEAR: SLIGHT — SIGNIFICANT CHANGE UP
POTASSIUM SERPL-MCNC: 2.9 MMOL/L — CRITICAL LOW (ref 3.5–5.3)
POTASSIUM SERPL-SCNC: 2.9 MMOL/L — CRITICAL LOW (ref 3.5–5.3)
PROT SERPL-MCNC: 7.3 G/DL — SIGNIFICANT CHANGE UP (ref 6–8.3)
RBC # BLD: 3.03 M/UL — LOW (ref 3.8–5.2)
RBC # FLD: 17.6 % — HIGH (ref 10.3–14.5)
RBC BLD AUTO: ABNORMAL
SODIUM SERPL-SCNC: 142 MMOL/L — SIGNIFICANT CHANGE UP (ref 135–145)
WBC # BLD: 3.01 K/UL — LOW (ref 3.8–10.5)
WBC # FLD AUTO: 3.01 K/UL — LOW (ref 3.8–10.5)

## 2023-03-21 PROCEDURE — 80053 COMPREHEN METABOLIC PANEL: CPT

## 2023-03-21 PROCEDURE — 36415 COLL VENOUS BLD VENIPUNCTURE: CPT

## 2023-03-21 PROCEDURE — 96361 HYDRATE IV INFUSION ADD-ON: CPT

## 2023-03-21 PROCEDURE — 85025 COMPLETE CBC W/AUTO DIFF WBC: CPT

## 2023-03-21 PROCEDURE — 96413 CHEMO IV INFUSION 1 HR: CPT

## 2023-03-21 PROCEDURE — 96375 TX/PRO/DX INJ NEW DRUG ADDON: CPT

## 2023-03-21 PROCEDURE — 84702 CHORIONIC GONADOTROPIN TEST: CPT

## 2023-03-21 RX ORDER — DIPHENHYDRAMINE HCL 50 MG
25 CAPSULE ORAL ONCE
Refills: 0 | Status: COMPLETED | OUTPATIENT
Start: 2023-03-21 | End: 2023-03-21

## 2023-03-21 RX ORDER — FAMOTIDINE 10 MG/ML
20 INJECTION INTRAVENOUS ONCE
Refills: 0 | Status: COMPLETED | OUTPATIENT
Start: 2023-03-21 | End: 2023-03-21

## 2023-03-21 RX ORDER — SODIUM CHLORIDE 9 MG/ML
250 INJECTION, SOLUTION INTRAVENOUS
Refills: 0 | Status: COMPLETED | OUTPATIENT
Start: 2023-03-21 | End: 2023-03-21

## 2023-03-21 RX ORDER — DEXAMETHASONE 0.5 MG/5ML
4 ELIXIR ORAL ONCE
Refills: 0 | Status: COMPLETED | OUTPATIENT
Start: 2023-03-21 | End: 2023-03-21

## 2023-03-21 RX ORDER — PACLITAXEL 6 MG/ML
144 INJECTION, SOLUTION, CONCENTRATE INTRAVENOUS ONCE
Refills: 0 | Status: COMPLETED | OUTPATIENT
Start: 2023-03-21 | End: 2023-03-21

## 2023-03-21 RX ORDER — ACETAMINOPHEN 500 MG
650 TABLET ORAL ONCE
Refills: 0 | Status: COMPLETED | OUTPATIENT
Start: 2023-03-21 | End: 2023-03-21

## 2023-03-21 RX ADMIN — Medication 4 MILLIGRAM(S): at 10:25

## 2023-03-21 RX ADMIN — Medication 204 MILLIGRAM(S): at 10:05

## 2023-03-21 RX ADMIN — Medication 650 MILLIGRAM(S): at 10:19

## 2023-03-21 RX ADMIN — Medication 650 MILLIGRAM(S): at 12:30

## 2023-03-21 RX ADMIN — PACLITAXEL 144 MILLIGRAM(S): 6 INJECTION, SOLUTION, CONCENTRATE INTRAVENOUS at 12:10

## 2023-03-21 RX ADMIN — SODIUM CHLORIDE 250 MILLILITER(S): 9 INJECTION, SOLUTION INTRAVENOUS at 14:15

## 2023-03-21 RX ADMIN — Medication 25 MILLIGRAM(S): at 10:45

## 2023-03-21 RX ADMIN — FAMOTIDINE 208 MILLIGRAM(S): 10 INJECTION INTRAVENOUS at 10:46

## 2023-03-21 RX ADMIN — PACLITAXEL 144 MILLIGRAM(S): 6 INJECTION, SOLUTION, CONCENTRATE INTRAVENOUS at 11:09

## 2023-03-21 RX ADMIN — FAMOTIDINE 20 MILLIGRAM(S): 10 INJECTION INTRAVENOUS at 11:02

## 2023-03-21 RX ADMIN — Medication 202 MILLIGRAM(S): at 10:30

## 2023-03-21 RX ADMIN — SODIUM CHLORIDE 130 MILLILITER(S): 9 INJECTION, SOLUTION INTRAVENOUS at 12:27

## 2023-03-22 ENCOUNTER — NON-APPOINTMENT (OUTPATIENT)
Age: 48
End: 2023-03-22

## 2023-03-28 ENCOUNTER — OUTPATIENT (OUTPATIENT)
Dept: OUTPATIENT SERVICES | Facility: HOSPITAL | Age: 48
LOS: 1 days | End: 2023-03-28
Payer: COMMERCIAL

## 2023-03-28 ENCOUNTER — LABORATORY RESULT (OUTPATIENT)
Age: 48
End: 2023-03-28

## 2023-03-28 ENCOUNTER — APPOINTMENT (OUTPATIENT)
Dept: INFUSION THERAPY | Facility: CLINIC | Age: 48
End: 2023-03-28

## 2023-03-28 ENCOUNTER — APPOINTMENT (OUTPATIENT)
Dept: HEMATOLOGY ONCOLOGY | Facility: CLINIC | Age: 48
End: 2023-03-28
Payer: COMMERCIAL

## 2023-03-28 VITALS
OXYGEN SATURATION: 99 % | TEMPERATURE: 97 F | HEART RATE: 87 BPM | SYSTOLIC BLOOD PRESSURE: 146 MMHG | HEIGHT: 62 IN | DIASTOLIC BLOOD PRESSURE: 81 MMHG | RESPIRATION RATE: 18 BRPM | WEIGHT: 171.96 LBS

## 2023-03-28 VITALS
DIASTOLIC BLOOD PRESSURE: 80 MMHG | WEIGHT: 172 LBS | RESPIRATION RATE: 18 BRPM | OXYGEN SATURATION: 99 % | SYSTOLIC BLOOD PRESSURE: 155 MMHG | BODY MASS INDEX: 31.65 KG/M2 | TEMPERATURE: 97.3 F | HEART RATE: 87 BPM | HEIGHT: 62 IN

## 2023-03-28 VITALS
SYSTOLIC BLOOD PRESSURE: 134 MMHG | DIASTOLIC BLOOD PRESSURE: 65 MMHG | OXYGEN SATURATION: 98 % | TEMPERATURE: 97 F | HEART RATE: 82 BPM | RESPIRATION RATE: 18 BRPM

## 2023-03-28 DIAGNOSIS — C50.912 MALIGNANT NEOPLASM OF UNSPECIFIED SITE OF LEFT FEMALE BREAST: ICD-10-CM

## 2023-03-28 LAB
ALBUMIN SERPL ELPH-MCNC: 3.2 G/DL
ALP BLD-CCNC: 70 U/L
ALT SERPL-CCNC: 29 U/L
ANION GAP SERPL CALC-SCNC: 6 MMOL/L
AST SERPL-CCNC: 29 U/L
BILIRUB SERPL-MCNC: 0.6 MG/DL
BUN SERPL-MCNC: 11 MG/DL
CALCIUM SERPL-MCNC: 9.4 MG/DL
CHLORIDE SERPL-SCNC: 107 MMOL/L
CO2 SERPL-SCNC: 28 MMOL/L
CREAT SERPL-MCNC: 0.8 MG/DL
EGFR: 93 ML/MIN/1.73M2
GLUCOSE SERPL-MCNC: 120 MG/DL
ISTAT TOTAL BETA HCG, PLASMA: <5 IU/L — SIGNIFICANT CHANGE UP
POTASSIUM SERPL-SCNC: 3.7 MMOL/L
PROT SERPL-MCNC: 7.1 G/DL
SODIUM SERPL-SCNC: 141 MMOL/L

## 2023-03-28 PROCEDURE — 96417 CHEMO IV INFUS EACH ADDL SEQ: CPT

## 2023-03-28 PROCEDURE — 36415 COLL VENOUS BLD VENIPUNCTURE: CPT

## 2023-03-28 PROCEDURE — 99214 OFFICE O/P EST MOD 30 MIN: CPT

## 2023-03-28 PROCEDURE — 96413 CHEMO IV INFUSION 1 HR: CPT

## 2023-03-28 PROCEDURE — 84702 CHORIONIC GONADOTROPIN TEST: CPT

## 2023-03-28 PROCEDURE — 96375 TX/PRO/DX INJ NEW DRUG ADDON: CPT

## 2023-03-28 RX ORDER — DIPHENHYDRAMINE HCL 50 MG
25 CAPSULE ORAL ONCE
Refills: 0 | Status: COMPLETED | OUTPATIENT
Start: 2023-03-28 | End: 2023-03-28

## 2023-03-28 RX ORDER — DEXAMETHASONE 0.5 MG/5ML
4 ELIXIR ORAL ONCE
Refills: 0 | Status: COMPLETED | OUTPATIENT
Start: 2023-03-28 | End: 2023-03-28

## 2023-03-28 RX ORDER — FAMOTIDINE 10 MG/ML
20 INJECTION INTRAVENOUS ONCE
Refills: 0 | Status: COMPLETED | OUTPATIENT
Start: 2023-03-28 | End: 2023-03-28

## 2023-03-28 RX ORDER — POTASSIUM CHLORIDE 1500 MG/1
20 TABLET, EXTENDED RELEASE ORAL DAILY
Qty: 6 | Refills: 0 | Status: DISCONTINUED | COMMUNITY
Start: 2023-02-28 | End: 2023-03-28

## 2023-03-28 RX ORDER — ACETAMINOPHEN 500 MG
650 TABLET ORAL ONCE
Refills: 0 | Status: COMPLETED | OUTPATIENT
Start: 2023-03-28 | End: 2023-03-28

## 2023-03-28 RX ORDER — TRASTUZUMAB-DKST 420 MG/20ML
486 INJECTION, POWDER, LYOPHILIZED, FOR SOLUTION INTRAVENOUS ONCE
Refills: 0 | Status: COMPLETED | OUTPATIENT
Start: 2023-03-28 | End: 2023-03-28

## 2023-03-28 RX ORDER — PACLITAXEL 6 MG/ML
144 INJECTION, SOLUTION, CONCENTRATE INTRAVENOUS ONCE
Refills: 0 | Status: COMPLETED | OUTPATIENT
Start: 2023-03-28 | End: 2023-03-28

## 2023-03-28 RX ORDER — POTASSIUM CHLORIDE 1500 MG/1
20 TABLET, EXTENDED RELEASE ORAL DAILY
Qty: 6 | Refills: 0 | Status: DISCONTINUED | COMMUNITY
Start: 2023-03-21 | End: 2023-03-28

## 2023-03-28 RX ORDER — PERTUZUMAB 30 MG/ML
420 INJECTION, SOLUTION, CONCENTRATE INTRAVENOUS ONCE
Refills: 0 | Status: COMPLETED | OUTPATIENT
Start: 2023-03-28 | End: 2023-03-28

## 2023-03-28 RX ADMIN — PACLITAXEL 144 MILLIGRAM(S): 6 INJECTION, SOLUTION, CONCENTRATE INTRAVENOUS at 15:49

## 2023-03-28 RX ADMIN — FAMOTIDINE 20 MILLIGRAM(S): 10 INJECTION INTRAVENOUS at 13:45

## 2023-03-28 RX ADMIN — TRASTUZUMAB-DKST 486 MILLIGRAM(S): 420 INJECTION, POWDER, LYOPHILIZED, FOR SOLUTION INTRAVENOUS at 12:42

## 2023-03-28 RX ADMIN — PERTUZUMAB 420 MILLIGRAM(S): 30 INJECTION, SOLUTION, CONCENTRATE INTRAVENOUS at 13:15

## 2023-03-28 RX ADMIN — FAMOTIDINE 208 MILLIGRAM(S): 10 INJECTION INTRAVENOUS at 13:30

## 2023-03-28 RX ADMIN — Medication 650 MILLIGRAM(S): at 13:33

## 2023-03-28 RX ADMIN — Medication 204 MILLIGRAM(S): at 13:47

## 2023-03-28 RX ADMIN — Medication 650 MILLIGRAM(S): at 15:00

## 2023-03-28 RX ADMIN — PERTUZUMAB 420 MILLIGRAM(S): 30 INJECTION, SOLUTION, CONCENTRATE INTRAVENOUS at 12:43

## 2023-03-28 RX ADMIN — TRASTUZUMAB-DKST 486 MILLIGRAM(S): 420 INJECTION, POWDER, LYOPHILIZED, FOR SOLUTION INTRAVENOUS at 12:11

## 2023-03-28 RX ADMIN — Medication 4 MILLIGRAM(S): at 14:02

## 2023-03-28 RX ADMIN — PACLITAXEL 144 MILLIGRAM(S): 6 INJECTION, SOLUTION, CONCENTRATE INTRAVENOUS at 14:46

## 2023-03-28 RX ADMIN — Medication 202 MILLIGRAM(S): at 13:15

## 2023-03-28 RX ADMIN — Medication 25 MILLIGRAM(S): at 13:30

## 2023-03-28 NOTE — HISTORY OF PRESENT ILLNESS
[Disease: _____________________] : Disease: [unfilled] [T: ___] : T[unfilled] [N: ___] : N[unfilled] [M: ___] : M[unfilled] [AJCC Stage: ____] : AJCC Stage: [unfilled] [Treatment Protocol] : Treatment Protocol [Cycle: ___] : Cycle: [unfilled] [Day: ___] : Day: [unfilled] [de-identified] : 48 y/o female with h/o left breast cancer who is on neoadjuvant TCHP since 1/23/23.\par \par PMHx:  HTN, multinodular goiter, followed by ENT, had 5 biopsies that were benign, last thyroid US last year.  Denies any compressive symptoms, such as dysphagia or dyspnea.  \par \par Patient initially experienced R breast heaviness which she describes as similar to menstrual symptoms in November 2022 prompting diagnostic workup that detected a left breast cancer, invasive ductal carcinoma, ER/[place] (-) HER-2 (+) with metastasis to lymph node. Left breast and axillary lymph node were both biopsied on 12/9/22.\par  \par Denies family history of breast or ovarian CA. Denies any history of breast surgeries or biopsies.\par \par 12/2/22 B/L dxMMG/US ([facility]): The left breast 0.6 x 0.5 x 0.5 cm indistinct irregular hypoechoic mass at 10:00 14 CMFN corresponds with mammography and is suspicious for malignancy. The 0.9 x 0.8 x 0.7 cm irregular complex cystic mass at 2:00 4 CMFN is indeterminate. The 1.8 x 1.2 cm abnormally thickened lymph node at 2:00 16 CMFN is suspicious for malignancy. No mammographic or sonographic evidence of malignancy in the right breast. BIRADS 5.\par \par 12/9/22 biopsy x 3:\par A. Left breast 2:00 4cmFN core biopsy: radial scar with calcifications, columnar cell change, cystic apocrine metaplasia.\par B. Left breast 2:00 16cm FN core biopsy: cortically thickened lymph node in the left axilla, metastatic carcinoma morphologically similar to part C.\par C. Left breast 10:00 14cm FN core biopsy: invasive moderately to poorly differentiated ductal carcinoma with micropapillary features, spanning at least 7 mm in this material.  Adjacent benign breast tissue with fibrocystic changes.  ER-, [place]-, HER2+ \par \par 12/30/22 MRI: \par 1. Left breast cancer 10:00 (14N) (buckle clip), newly diagnosed cancer.  Measures about 6 mm.\par 2. Left breast, 12:00 (15N) (near chest wall), there is an irregular 32 x 12 x 14 mm mass corresponding to the area of distortion seen on the recent mammogram.  There is no clip in this mass.  Recommend second look ultrasound and biopsy.  If not seen on ultrasound, recommend stereotactic biopsy.\par 3. Left 2:00 position, 4cm from  nipple (top hat shaped clip) there is an irregular 5 mm mass corresponding to the biopsy showing radial scar.\par 4. Left axillary adenopathy.  Corresponds to biopsy (cork clip) showing metastatic cancer.\par \par 1/6/23 PETCT: \par 1. Multiple hypermetabolic left breast lesions and hypermetabolic left axillary lymphadenopathy, which is consistent with breast malignancy with local colton spread.  No evidence of distant FDG avid metastatic disease.\par 2. Diffuse left-sided asymmetric enlargement of the thyroid gland with heterogeneous attenuation, typical of multinodular goiter.  There is also slight rightward tracheal deviation due to mass effect without tracheal narrowing.\par \par 1/10/23 ECHO: LVEF 65-70%\par \par 1/10/23 L breast US: Left breast 12:00 14 cm from the nipple, there is an irregular 33 x 7 x 15 mm mass corresponding to MRI finding.\par US guided L breast core biopsy: Invasive ductal carcinoma, poorly-differentiated, DCIS. Concordant malignant.\par \par 2/2/23 port placement. [de-identified] : moderately to poorly invasive ductal carcinoma [de-identified] : ER-, DC- HER2+  [de-identified] : 12/27/22 ([facility]) 47 HBOC gene panel: negative for pathogenic mutations [FreeTextEntry1] : NEOADJUVANT weekly paclitaxel and carboplatin x 12 cycles in combination with Kanjinti and pertuzumab every 3 weeks\par 3/7/23 - carboplatin discontinued due to neutropenia. [de-identified] : She was feeling drained the end of last week.  Otherwise she feels well, denies fever, chills, peripheral neuropathy, n/v/c/d, chest pain, abdominal pain.  She is scheduled for an MRI on 4/17 and follow up appointment with Dr. Leal on 4/18.

## 2023-04-04 ENCOUNTER — APPOINTMENT (OUTPATIENT)
Dept: INFUSION THERAPY | Facility: CLINIC | Age: 48
End: 2023-04-04

## 2023-04-04 ENCOUNTER — NON-APPOINTMENT (OUTPATIENT)
Age: 48
End: 2023-04-04

## 2023-04-04 ENCOUNTER — OUTPATIENT (OUTPATIENT)
Dept: OUTPATIENT SERVICES | Facility: HOSPITAL | Age: 48
LOS: 1 days | End: 2023-04-04
Payer: COMMERCIAL

## 2023-04-04 VITALS
RESPIRATION RATE: 18 BRPM | TEMPERATURE: 98 F | SYSTOLIC BLOOD PRESSURE: 144 MMHG | HEIGHT: 62 IN | WEIGHT: 171.96 LBS | OXYGEN SATURATION: 97 % | DIASTOLIC BLOOD PRESSURE: 81 MMHG | HEART RATE: 79 BPM

## 2023-04-04 VITALS
OXYGEN SATURATION: 99 % | RESPIRATION RATE: 18 BRPM | HEART RATE: 78 BPM | DIASTOLIC BLOOD PRESSURE: 81 MMHG | SYSTOLIC BLOOD PRESSURE: 139 MMHG | TEMPERATURE: 98 F

## 2023-04-04 DIAGNOSIS — C50.912 MALIGNANT NEOPLASM OF UNSPECIFIED SITE OF LEFT FEMALE BREAST: ICD-10-CM

## 2023-04-04 LAB
ALBUMIN SERPL ELPH-MCNC: 3.7 G/DL — SIGNIFICANT CHANGE UP (ref 3.3–5)
ALP SERPL-CCNC: 74 U/L — SIGNIFICANT CHANGE UP (ref 40–120)
ALT FLD-CCNC: 23 U/L — SIGNIFICANT CHANGE UP (ref 10–45)
ANION GAP SERPL CALC-SCNC: 4 MMOL/L — LOW (ref 5–17)
AST SERPL-CCNC: 26 U/L — SIGNIFICANT CHANGE UP (ref 10–40)
BILIRUB SERPL-MCNC: 0.7 MG/DL — SIGNIFICANT CHANGE UP (ref 0.2–1.2)
BUN SERPL-MCNC: 8 MG/DL — SIGNIFICANT CHANGE UP (ref 7–23)
CALCIUM SERPL-MCNC: 9.6 MG/DL — SIGNIFICANT CHANGE UP (ref 8.4–10.5)
CHLORIDE SERPL-SCNC: 106 MMOL/L — SIGNIFICANT CHANGE UP (ref 96–108)
CO2 SERPL-SCNC: 32 MMOL/L — HIGH (ref 22–31)
CREAT SERPL-MCNC: 0.7 MG/DL — SIGNIFICANT CHANGE UP (ref 0.5–1.3)
EGFR: 109 ML/MIN/1.73M2 — SIGNIFICANT CHANGE UP
GLUCOSE SERPL-MCNC: 100 MG/DL — HIGH (ref 70–99)
HCT VFR BLD CALC: 27.4 % — LOW (ref 34.5–45)
HGB BLD-MCNC: 9.2 G/DL — LOW (ref 11.5–15.5)
ISTAT TOTAL BETA HCG, PLASMA: <5 IU/L — SIGNIFICANT CHANGE UP
LYMPHOCYTES # BLD AUTO: 2.8 K/UL — SIGNIFICANT CHANGE UP (ref 1–3.3)
LYMPHOCYTES # BLD AUTO: 57.3 % — HIGH (ref 13–44)
MCHC RBC-ENTMCNC: 31.1 PG — SIGNIFICANT CHANGE UP (ref 27–34)
MCHC RBC-ENTMCNC: 33.6 GM/DL — SIGNIFICANT CHANGE UP (ref 32–36)
MCV RBC AUTO: 92.6 FL — SIGNIFICANT CHANGE UP (ref 80–100)
NEUTROPHILS # BLD AUTO: 1.5 K/UL — LOW (ref 1.8–7.4)
NEUTROPHILS NFR BLD AUTO: 32.3 % — LOW (ref 43–77)
PLATELET # BLD AUTO: 405 K/UL — HIGH (ref 150–400)
POTASSIUM SERPL-MCNC: 4.4 MMOL/L — SIGNIFICANT CHANGE UP (ref 3.5–5.3)
POTASSIUM SERPL-SCNC: 4.4 MMOL/L — SIGNIFICANT CHANGE UP (ref 3.5–5.3)
PROT SERPL-MCNC: 7.6 G/DL — SIGNIFICANT CHANGE UP (ref 6–8.3)
RBC # BLD: 2.96 M/UL — LOW (ref 3.8–5.2)
RBC # FLD: 19.2 % — HIGH (ref 10.3–14.5)
SODIUM SERPL-SCNC: 142 MMOL/L — SIGNIFICANT CHANGE UP (ref 135–145)
WBC # BLD: 4.8 K/UL — SIGNIFICANT CHANGE UP (ref 3.8–10.5)
WBC # FLD AUTO: 4.8 K/UL — SIGNIFICANT CHANGE UP (ref 3.8–10.5)

## 2023-04-04 PROCEDURE — 84702 CHORIONIC GONADOTROPIN TEST: CPT

## 2023-04-04 PROCEDURE — 96413 CHEMO IV INFUSION 1 HR: CPT

## 2023-04-04 PROCEDURE — 85025 COMPLETE CBC W/AUTO DIFF WBC: CPT

## 2023-04-04 PROCEDURE — 96375 TX/PRO/DX INJ NEW DRUG ADDON: CPT

## 2023-04-04 PROCEDURE — 36415 COLL VENOUS BLD VENIPUNCTURE: CPT

## 2023-04-04 PROCEDURE — 80053 COMPREHEN METABOLIC PANEL: CPT

## 2023-04-04 RX ORDER — FAMOTIDINE 10 MG/ML
20 INJECTION INTRAVENOUS ONCE
Refills: 0 | Status: COMPLETED | OUTPATIENT
Start: 2023-04-04 | End: 2023-04-04

## 2023-04-04 RX ORDER — ACETAMINOPHEN 500 MG
650 TABLET ORAL ONCE
Refills: 0 | Status: COMPLETED | OUTPATIENT
Start: 2023-04-04 | End: 2023-04-04

## 2023-04-04 RX ORDER — PACLITAXEL 6 MG/ML
144 INJECTION, SOLUTION, CONCENTRATE INTRAVENOUS ONCE
Refills: 0 | Status: COMPLETED | OUTPATIENT
Start: 2023-04-04 | End: 2023-04-04

## 2023-04-04 RX ORDER — DIPHENHYDRAMINE HCL 50 MG
25 CAPSULE ORAL ONCE
Refills: 0 | Status: COMPLETED | OUTPATIENT
Start: 2023-04-04 | End: 2023-04-04

## 2023-04-04 RX ORDER — DEXAMETHASONE 0.5 MG/5ML
4 ELIXIR ORAL ONCE
Refills: 0 | Status: COMPLETED | OUTPATIENT
Start: 2023-04-04 | End: 2023-04-04

## 2023-04-04 RX ADMIN — PACLITAXEL 144 MILLIGRAM(S): 6 INJECTION, SOLUTION, CONCENTRATE INTRAVENOUS at 12:56

## 2023-04-04 RX ADMIN — Medication 650 MILLIGRAM(S): at 12:00

## 2023-04-04 RX ADMIN — Medication 202 MILLIGRAM(S): at 11:38

## 2023-04-04 RX ADMIN — Medication 650 MILLIGRAM(S): at 11:09

## 2023-04-04 RX ADMIN — FAMOTIDINE 20 MILLIGRAM(S): 10 INJECTION INTRAVENOUS at 11:24

## 2023-04-04 RX ADMIN — Medication 204 MILLIGRAM(S): at 11:15

## 2023-04-04 RX ADMIN — Medication 25 MILLIGRAM(S): at 01:11

## 2023-04-04 RX ADMIN — PACLITAXEL 144 MILLIGRAM(S): 6 INJECTION, SOLUTION, CONCENTRATE INTRAVENOUS at 11:56

## 2023-04-04 RX ADMIN — FAMOTIDINE 208 MILLIGRAM(S): 10 INJECTION INTRAVENOUS at 11:09

## 2023-04-04 RX ADMIN — Medication 4 MILLIGRAM(S): at 11:30

## 2023-04-07 ENCOUNTER — EMERGENCY (EMERGENCY)
Facility: HOSPITAL | Age: 48
LOS: 1 days | Discharge: HOME | End: 2023-04-07
Attending: EMERGENCY MEDICINE | Admitting: EMERGENCY MEDICINE
Payer: COMMERCIAL

## 2023-04-07 VITALS
SYSTOLIC BLOOD PRESSURE: 139 MMHG | DIASTOLIC BLOOD PRESSURE: 92 MMHG | HEART RATE: 100 BPM | TEMPERATURE: 99 F | OXYGEN SATURATION: 98 % | WEIGHT: 177.03 LBS | HEIGHT: 62 IN | RESPIRATION RATE: 16 BRPM

## 2023-04-07 VITALS
OXYGEN SATURATION: 99 % | RESPIRATION RATE: 16 BRPM | TEMPERATURE: 100 F | HEART RATE: 91 BPM | DIASTOLIC BLOOD PRESSURE: 96 MMHG | SYSTOLIC BLOOD PRESSURE: 141 MMHG

## 2023-04-07 DIAGNOSIS — I10 ESSENTIAL (PRIMARY) HYPERTENSION: ICD-10-CM

## 2023-04-07 DIAGNOSIS — C50.919 MALIGNANT NEOPLASM OF UNSPECIFIED SITE OF UNSPECIFIED FEMALE BREAST: ICD-10-CM

## 2023-04-07 DIAGNOSIS — L03.313 CELLULITIS OF CHEST WALL: ICD-10-CM

## 2023-04-07 DIAGNOSIS — Z86.39 PERSONAL HISTORY OF OTHER ENDOCRINE, NUTRITIONAL AND METABOLIC DISEASE: ICD-10-CM

## 2023-04-07 DIAGNOSIS — R07.89 OTHER CHEST PAIN: ICD-10-CM

## 2023-04-07 PROCEDURE — 99285 EMERGENCY DEPT VISIT HI MDM: CPT

## 2023-04-07 NOTE — ED ADULT TRIAGE NOTE - WEIGHT IN LBS
Please take the medications as prescribed.     Please make an appointment to follow up with Your Primary Care Provider or Primary Care Center (phone: [phone] in 4-5 days even if entirely better.    Return to the ER if symptoms worsen.      446

## 2023-04-07 NOTE — ED PROVIDER NOTE - OBJECTIVE STATEMENT
47F hx breast ca (currently undergoing chemo), htn, c/o pain around right chest chemoport. port placed in february. pt states during last chemo session on tuesday it felt a little sensitive. states for past 2 days pain when she touches area. states felt like she has subjective fever yesterday and took tylenol. no vomiting. no sob. no cough. states last chemo session is next tuesday.

## 2023-04-07 NOTE — ED PROVIDER NOTE - NSFOLLOWUPINSTRUCTIONS_ED_ALL_ED_FT
Cellulitis    WHAT YOU NEED TO KNOW:    Cellulitis is a skin infection caused by bacteria. Cellulitis is common and can become severe. Cellulitis usually appears on the lower legs. It can also appear on the arms, face, and other areas. Cellulitis develops when bacteria enter a crack or break in your skin, such as a scratch, bite, or cut.    DISCHARGE INSTRUCTIONS:    Return to the emergency department if:    Your wound gets larger and more painful.    You feel a crackling under your skin when you touch it.    You have purple dots or bumps on your skin, or you see bleeding under your skin.    You see red streaks coming from the infected area.  Call your doctor if:    The red, warm, swollen area gets larger.    Your fever or pain does not go away or gets worse.    The area does not get smaller after 3 days of antibiotics.    You have questions or concerns about your condition or care.  Medicines: You should start to see improvement in 3 days. If cellulitis is not treated, the infection can spread through your body and become life-threatening. You may need any of the following medicines:    Antibiotics help treat a bacterial infection.    Acetaminophen decreases pain and fever. It is available without a doctor's order. Ask how much to take and how often to take it. Follow directions. Read the labels of all other medicines you are using to see if they also contain acetaminophen, or ask your doctor or pharmacist. Acetaminophen can cause liver damage if not taken correctly.    NSAIDs, such as ibuprofen, help decrease swelling, pain, and fever. This medicine is available with or without a doctor's order. NSAIDs can cause stomach bleeding or kidney problems in certain people. If you take blood thinner medicine, always ask your healthcare provider if NSAIDs are safe for you. Always read the medicine label and follow directions.    Take your medicine as directed. Contact your healthcare provider if you think your medicine is not helping or if you have side effects. Tell your provider if you are allergic to any medicine. Keep a list of the medicines, vitamins, and herbs you take. Include the amounts, and when and why you take them. Bring the list or the pill bottles to follow-up visits. Carry your medicine list with you in case of an emergency.  Self-care:    Wash the area with soap and water every day. Gently pat dry. Use bandages if directed by your healthcare provider.    Apply cream or ointment as directed. These help protect the area. Most over-the-counter products, such as petroleum jelly, are good to use. Ask your healthcare provider about specific creams or ointments you should use.    Place a cool, damp cloth on the area. Use clean cloths and clean water. You can do this as often as you need to. Cool, damp cloths may help decrease pain.    Elevate the area above the level of your heart as often as you can. This will help decrease swelling and pain. Prop the area on pillows or blankets to keep it elevated comfortably.    Prevent cellulitis:    Do not scratch bug bites or areas of injury. You increase your risk for cellulitis by scratching these areas.    Do not share personal items, such as towels, clothing, and razors.    Clean exercise equipment with germ-killing  before and after you use it.    Treat athlete’s foot. This can help prevent the spread of a bacterial skin infection.    Wash your hands often. Use soap and water. Wash your hands after you use the bathroom, change a child's diapers, or sneeze. Wash your hands before you prepare or eat food. Use lotion to prevent dry, cracked skin.    Follow up with your doctor within 3 days, or as directed: He or she will check if your cellulitis is getting better. Write down your questions so you remember to ask them during your visits.

## 2023-04-07 NOTE — ED ADULT TRIAGE NOTE - CHIEF COMPLAINT QUOTE
pt c/o pain to chemo port site. pt hx left breast CA, on IV chemo q tuesday. pain started during her last session 3/28.

## 2023-04-07 NOTE — ED PROVIDER NOTE - PROGRESS NOTE DETAILS
no fluid collection on US. doubt abscess, likely local cellulitis. will give keflex, recommend pt to f/u with onc dept.   I have discussed the discharge plan with the patient. The patient agrees with the plan, as discussed.  The patient understands Emergency Department diagnosis is a preliminary diagnosis often based on limited information and that the patient must adhere to the follow-up plan as discussed.  The patient understands that if the symptoms worsen or if prescribed medications do not have the desired/planned effect that the patient may return to the Emergency Department at any time for further evaluation and treatment.

## 2023-04-07 NOTE — ED PROVIDER NOTE - PATIENT PORTAL LINK FT
You can access the FollowMyHealth Patient Portal offered by [facility] by registering at the following website: [email or web address]. By joining [employer]’s FollowMyHealth portal, you will also be able to view your health information using other applications (apps) compatible with our system.

## 2023-04-07 NOTE — ED PROVIDER NOTE - CLINICAL SUMMARY MEDICAL DECISION MAKING FREE TEXT BOX
pain to chemo port, mild redness to chest wall, possible cellulitis  -check labs  -check cultures  -cxr  -US to r/o fluid collection around port  pt declined pain medication at this time

## 2023-04-08 LAB
ANION GAP SERPL CALC-SCNC: 10 MMOL/L — SIGNIFICANT CHANGE UP (ref 5–17)
BASOPHILS # BLD AUTO: 0.03 K/UL — SIGNIFICANT CHANGE UP (ref 0–0.2)
BASOPHILS NFR BLD AUTO: 0.6 % — SIGNIFICANT CHANGE UP (ref 0–2)
BUN SERPL-MCNC: 7 MG/DL — SIGNIFICANT CHANGE UP (ref 7–23)
CALCIUM SERPL-MCNC: 9.1 MG/DL — SIGNIFICANT CHANGE UP (ref 8.4–10.5)
CHLORIDE SERPL-SCNC: 102 MMOL/L — SIGNIFICANT CHANGE UP (ref 96–108)
CO2 SERPL-SCNC: 26 MMOL/L — SIGNIFICANT CHANGE UP (ref 22–31)
CREAT SERPL-MCNC: 0.64 MG/DL — SIGNIFICANT CHANGE UP (ref 0.5–1.3)
EGFR: 111 ML/MIN/1.73M2 — SIGNIFICANT CHANGE UP
EOSINOPHIL # BLD AUTO: 0.02 K/UL — SIGNIFICANT CHANGE UP (ref 0–0.5)
EOSINOPHIL NFR BLD AUTO: 0.4 % — SIGNIFICANT CHANGE UP (ref 0–6)
GLUCOSE SERPL-MCNC: 109 MG/DL — HIGH (ref 70–99)
HCG SERPL-ACNC: 2 MIU/ML — SIGNIFICANT CHANGE UP
HCT VFR BLD CALC: 28.1 % — LOW (ref 34.5–45)
HGB BLD-MCNC: 9.3 G/DL — LOW (ref 11.5–15.5)
IMM GRANULOCYTES NFR BLD AUTO: 0.2 % — SIGNIFICANT CHANGE UP (ref 0–0.9)
LACTATE SERPL-SCNC: 0.7 MMOL/L — SIGNIFICANT CHANGE UP (ref 0.5–2)
LYMPHOCYTES # BLD AUTO: 2.48 K/UL — SIGNIFICANT CHANGE UP (ref 1–3.3)
LYMPHOCYTES # BLD AUTO: 52.5 % — HIGH (ref 13–44)
MCHC RBC-ENTMCNC: 31.6 PG — SIGNIFICANT CHANGE UP (ref 27–34)
MCHC RBC-ENTMCNC: 33.1 GM/DL — SIGNIFICANT CHANGE UP (ref 32–36)
MCV RBC AUTO: 95.6 FL — SIGNIFICANT CHANGE UP (ref 80–100)
MONOCYTES # BLD AUTO: 0.33 K/UL — SIGNIFICANT CHANGE UP (ref 0–0.9)
MONOCYTES NFR BLD AUTO: 7 % — SIGNIFICANT CHANGE UP (ref 2–14)
NEUTROPHILS # BLD AUTO: 1.85 K/UL — SIGNIFICANT CHANGE UP (ref 1.8–7.4)
NEUTROPHILS NFR BLD AUTO: 39.3 % — LOW (ref 43–77)
NRBC # BLD: 0 /100 WBCS — SIGNIFICANT CHANGE UP (ref 0–0)
PLATELET # BLD AUTO: 402 K/UL — HIGH (ref 150–400)
POTASSIUM SERPL-MCNC: 4.2 MMOL/L — SIGNIFICANT CHANGE UP (ref 3.5–5.3)
POTASSIUM SERPL-SCNC: 4.2 MMOL/L — SIGNIFICANT CHANGE UP (ref 3.5–5.3)
RBC # BLD: 2.94 M/UL — LOW (ref 3.8–5.2)
RBC # FLD: 19.3 % — HIGH (ref 10.3–14.5)
SODIUM SERPL-SCNC: 138 MMOL/L — SIGNIFICANT CHANGE UP (ref 135–145)
WBC # BLD: 4.72 K/UL — SIGNIFICANT CHANGE UP (ref 3.8–10.5)
WBC # FLD AUTO: 4.72 K/UL — SIGNIFICANT CHANGE UP (ref 3.8–10.5)

## 2023-04-08 PROCEDURE — 71046 X-RAY EXAM CHEST 2 VIEWS: CPT | Mod: 26

## 2023-04-08 PROCEDURE — 87040 BLOOD CULTURE FOR BACTERIA: CPT

## 2023-04-08 PROCEDURE — 84702 CHORIONIC GONADOTROPIN TEST: CPT

## 2023-04-08 PROCEDURE — 83605 ASSAY OF LACTIC ACID: CPT

## 2023-04-08 PROCEDURE — 76604 US EXAM CHEST: CPT

## 2023-04-08 PROCEDURE — 80048 BASIC METABOLIC PNL TOTAL CA: CPT

## 2023-04-08 PROCEDURE — 99284 EMERGENCY DEPT VISIT MOD MDM: CPT | Mod: 25

## 2023-04-08 PROCEDURE — 36415 COLL VENOUS BLD VENIPUNCTURE: CPT

## 2023-04-08 PROCEDURE — 76604 US EXAM CHEST: CPT | Mod: 26

## 2023-04-08 PROCEDURE — 85025 COMPLETE CBC W/AUTO DIFF WBC: CPT

## 2023-04-08 PROCEDURE — 71046 X-RAY EXAM CHEST 2 VIEWS: CPT

## 2023-04-08 RX ORDER — CEPHALEXIN 500 MG
500 CAPSULE ORAL ONCE
Refills: 0 | Status: COMPLETED | OUTPATIENT
Start: 2023-04-08 | End: 2023-04-08

## 2023-04-08 RX ORDER — CEPHALEXIN 500 MG
1 CAPSULE ORAL
Qty: 14 | Refills: 0
Start: 2023-04-08 | End: 2023-04-14

## 2023-04-08 RX ADMIN — Medication 500 MILLIGRAM(S): at 03:42

## 2023-04-08 NOTE — ED ADULT NURSE NOTE - OBJECTIVE STATEMENT
Received a 47 year old female with a chief complaint of pain to her implanted catheter site. Patient under chemotherapy - scheduled for her last treatment. Patient reported increased pain and tenderness to the site from her last chemotherapy

## 2023-04-10 PROBLEM — Z86.39 PERSONAL HISTORY OF OTHER ENDOCRINE, NUTRITIONAL AND METABOLIC DISEASE: Chronic | Status: ACTIVE | Noted: 2023-04-07

## 2023-04-10 PROBLEM — C50.919 MALIGNANT NEOPLASM OF UNSPECIFIED SITE OF UNSPECIFIED FEMALE BREAST: Chronic | Status: ACTIVE | Noted: 2023-04-07

## 2023-04-11 ENCOUNTER — NON-APPOINTMENT (OUTPATIENT)
Age: 48
End: 2023-04-11

## 2023-04-11 ENCOUNTER — OUTPATIENT (OUTPATIENT)
Dept: OUTPATIENT SERVICES | Facility: HOSPITAL | Age: 48
LOS: 1 days | End: 2023-04-11
Payer: COMMERCIAL

## 2023-04-11 ENCOUNTER — APPOINTMENT (OUTPATIENT)
Dept: INFUSION THERAPY | Facility: CLINIC | Age: 48
End: 2023-04-11

## 2023-04-11 ENCOUNTER — FORM ENCOUNTER (OUTPATIENT)
Age: 48
End: 2023-04-11

## 2023-04-11 VITALS
HEART RATE: 77 BPM | WEIGHT: 173.94 LBS | DIASTOLIC BLOOD PRESSURE: 88 MMHG | RESPIRATION RATE: 18 BRPM | TEMPERATURE: 98 F | HEIGHT: 62 IN | SYSTOLIC BLOOD PRESSURE: 146 MMHG | OXYGEN SATURATION: 98 %

## 2023-04-11 VITALS
DIASTOLIC BLOOD PRESSURE: 94 MMHG | OXYGEN SATURATION: 100 % | RESPIRATION RATE: 18 BRPM | TEMPERATURE: 98 F | SYSTOLIC BLOOD PRESSURE: 148 MMHG | HEART RATE: 81 BPM

## 2023-04-11 DIAGNOSIS — C50.912 MALIGNANT NEOPLASM OF UNSPECIFIED SITE OF LEFT FEMALE BREAST: ICD-10-CM

## 2023-04-11 LAB
ALBUMIN SERPL ELPH-MCNC: 3.3 G/DL — SIGNIFICANT CHANGE UP (ref 3.3–5)
ALP SERPL-CCNC: 69 U/L — SIGNIFICANT CHANGE UP (ref 40–120)
ALT FLD-CCNC: 27 U/L — SIGNIFICANT CHANGE UP (ref 10–45)
ANION GAP SERPL CALC-SCNC: 7 MMOL/L — SIGNIFICANT CHANGE UP (ref 5–17)
AST SERPL-CCNC: 32 U/L — SIGNIFICANT CHANGE UP (ref 10–40)
BILIRUB SERPL-MCNC: 0.6 MG/DL — SIGNIFICANT CHANGE UP (ref 0.2–1.2)
BUN SERPL-MCNC: 10 MG/DL — SIGNIFICANT CHANGE UP (ref 7–23)
CALCIUM SERPL-MCNC: 9.3 MG/DL — SIGNIFICANT CHANGE UP (ref 8.4–10.5)
CHLORIDE SERPL-SCNC: 104 MMOL/L — SIGNIFICANT CHANGE UP (ref 96–108)
CO2 SERPL-SCNC: 30 MMOL/L — SIGNIFICANT CHANGE UP (ref 22–31)
CREAT SERPL-MCNC: 0.7 MG/DL — SIGNIFICANT CHANGE UP (ref 0.5–1.3)
EGFR: 109 ML/MIN/1.73M2 — SIGNIFICANT CHANGE UP
GLUCOSE SERPL-MCNC: 100 MG/DL — HIGH (ref 70–99)
HCT VFR BLD CALC: 27.3 % — LOW (ref 34.5–45)
HGB BLD-MCNC: 9.1 G/DL — LOW (ref 11.5–15.5)
ISTAT TOTAL BETA HCG, PLASMA: <5 IU/L — SIGNIFICANT CHANGE UP
LYMPHOCYTES # BLD AUTO: 2.9 K/UL — SIGNIFICANT CHANGE UP (ref 1–3.3)
LYMPHOCYTES # BLD AUTO: 52.6 % — HIGH (ref 13–44)
MCHC RBC-ENTMCNC: 31.6 PG — SIGNIFICANT CHANGE UP (ref 27–34)
MCHC RBC-ENTMCNC: 33.3 GM/DL — SIGNIFICANT CHANGE UP (ref 32–36)
MCV RBC AUTO: 94.8 FL — SIGNIFICANT CHANGE UP (ref 80–100)
NEUTROPHILS # BLD AUTO: 2 K/UL — SIGNIFICANT CHANGE UP (ref 1.8–7.4)
NEUTROPHILS NFR BLD AUTO: 35 % — LOW (ref 43–77)
PLATELET # BLD AUTO: 405 K/UL — HIGH (ref 150–400)
POTASSIUM SERPL-MCNC: 4.9 MMOL/L — SIGNIFICANT CHANGE UP (ref 3.5–5.3)
POTASSIUM SERPL-SCNC: 4.9 MMOL/L — SIGNIFICANT CHANGE UP (ref 3.5–5.3)
PROT SERPL-MCNC: 7.6 G/DL — SIGNIFICANT CHANGE UP (ref 6–8.3)
RBC # BLD: 2.88 M/UL — LOW (ref 3.8–5.2)
RBC # FLD: 19.7 % — HIGH (ref 10.3–14.5)
SODIUM SERPL-SCNC: 141 MMOL/L — SIGNIFICANT CHANGE UP (ref 135–145)
WBC # BLD: 5.6 K/UL — SIGNIFICANT CHANGE UP (ref 3.8–10.5)
WBC # FLD AUTO: 5.6 K/UL — SIGNIFICANT CHANGE UP (ref 3.8–10.5)

## 2023-04-11 PROCEDURE — 85025 COMPLETE CBC W/AUTO DIFF WBC: CPT

## 2023-04-11 PROCEDURE — 96375 TX/PRO/DX INJ NEW DRUG ADDON: CPT

## 2023-04-11 PROCEDURE — 96413 CHEMO IV INFUSION 1 HR: CPT

## 2023-04-11 PROCEDURE — 84702 CHORIONIC GONADOTROPIN TEST: CPT

## 2023-04-11 PROCEDURE — 36415 COLL VENOUS BLD VENIPUNCTURE: CPT

## 2023-04-11 PROCEDURE — 80053 COMPREHEN METABOLIC PANEL: CPT

## 2023-04-11 RX ORDER — DIPHENHYDRAMINE HCL 50 MG
25 CAPSULE ORAL ONCE
Refills: 0 | Status: COMPLETED | OUTPATIENT
Start: 2023-04-11 | End: 2023-04-11

## 2023-04-11 RX ORDER — DEXAMETHASONE 0.5 MG/5ML
4 ELIXIR ORAL ONCE
Refills: 0 | Status: COMPLETED | OUTPATIENT
Start: 2023-04-11 | End: 2023-04-11

## 2023-04-11 RX ORDER — PACLITAXEL 6 MG/ML
144 INJECTION, SOLUTION, CONCENTRATE INTRAVENOUS ONCE
Refills: 0 | Status: COMPLETED | OUTPATIENT
Start: 2023-04-11 | End: 2023-04-11

## 2023-04-11 RX ORDER — ACETAMINOPHEN 500 MG
650 TABLET ORAL ONCE
Refills: 0 | Status: COMPLETED | OUTPATIENT
Start: 2023-04-11 | End: 2023-04-11

## 2023-04-11 RX ORDER — FAMOTIDINE 10 MG/ML
20 INJECTION INTRAVENOUS ONCE
Refills: 0 | Status: COMPLETED | OUTPATIENT
Start: 2023-04-11 | End: 2023-04-11

## 2023-04-11 RX ADMIN — Medication 204 MILLIGRAM(S): at 12:08

## 2023-04-11 RX ADMIN — PACLITAXEL 144 MILLIGRAM(S): 6 INJECTION, SOLUTION, CONCENTRATE INTRAVENOUS at 12:25

## 2023-04-11 RX ADMIN — FAMOTIDINE 208 MILLIGRAM(S): 10 INJECTION INTRAVENOUS at 11:55

## 2023-04-11 RX ADMIN — Medication 650 MILLIGRAM(S): at 12:26

## 2023-04-11 RX ADMIN — Medication 25 MILLIGRAM(S): at 12:23

## 2023-04-11 RX ADMIN — Medication 4 MILLIGRAM(S): at 12:23

## 2023-04-11 RX ADMIN — PACLITAXEL 144 MILLIGRAM(S): 6 INJECTION, SOLUTION, CONCENTRATE INTRAVENOUS at 13:25

## 2023-04-11 RX ADMIN — Medication 202 MILLIGRAM(S): at 12:18

## 2023-04-11 RX ADMIN — FAMOTIDINE 20 MILLIGRAM(S): 10 INJECTION INTRAVENOUS at 12:10

## 2023-04-11 RX ADMIN — Medication 650 MILLIGRAM(S): at 11:56

## 2023-04-13 LAB
CULTURE RESULTS: SIGNIFICANT CHANGE UP
CULTURE RESULTS: SIGNIFICANT CHANGE UP
SPECIMEN SOURCE: SIGNIFICANT CHANGE UP
SPECIMEN SOURCE: SIGNIFICANT CHANGE UP

## 2023-04-17 ENCOUNTER — FORM ENCOUNTER (OUTPATIENT)
Age: 48
End: 2023-04-17

## 2023-04-17 ENCOUNTER — OUTPATIENT (OUTPATIENT)
Dept: OUTPATIENT SERVICES | Facility: HOSPITAL | Age: 48
LOS: 1 days | End: 2023-04-17
Payer: COMMERCIAL

## 2023-04-17 ENCOUNTER — APPOINTMENT (OUTPATIENT)
Dept: MRI IMAGING | Facility: HOSPITAL | Age: 48
End: 2023-04-17
Payer: COMMERCIAL

## 2023-04-17 PROCEDURE — C8937: CPT

## 2023-04-17 PROCEDURE — C8908: CPT

## 2023-04-17 PROCEDURE — A9585: CPT

## 2023-04-17 PROCEDURE — 77049 MRI BREAST C-+ W/CAD BI: CPT | Mod: 26

## 2023-04-18 ENCOUNTER — OUTPATIENT (OUTPATIENT)
Dept: OUTPATIENT SERVICES | Facility: HOSPITAL | Age: 48
LOS: 1 days | End: 2023-04-18
Payer: COMMERCIAL

## 2023-04-18 ENCOUNTER — NON-APPOINTMENT (OUTPATIENT)
Age: 48
End: 2023-04-18

## 2023-04-18 ENCOUNTER — APPOINTMENT (OUTPATIENT)
Dept: INFUSION THERAPY | Facility: CLINIC | Age: 48
End: 2023-04-18

## 2023-04-18 ENCOUNTER — APPOINTMENT (OUTPATIENT)
Dept: BREAST CENTER | Facility: CLINIC | Age: 48
End: 2023-04-18
Payer: COMMERCIAL

## 2023-04-18 ENCOUNTER — APPOINTMENT (OUTPATIENT)
Dept: HEMATOLOGY ONCOLOGY | Facility: CLINIC | Age: 48
End: 2023-04-18
Payer: COMMERCIAL

## 2023-04-18 VITALS
SYSTOLIC BLOOD PRESSURE: 143 MMHG | HEIGHT: 62 IN | DIASTOLIC BLOOD PRESSURE: 90 MMHG | WEIGHT: 176 LBS | BODY MASS INDEX: 32.39 KG/M2 | HEART RATE: 83 BPM

## 2023-04-18 VITALS
HEART RATE: 96 BPM | DIASTOLIC BLOOD PRESSURE: 85 MMHG | TEMPERATURE: 98 F | HEIGHT: 62 IN | SYSTOLIC BLOOD PRESSURE: 154 MMHG | OXYGEN SATURATION: 98 % | BODY MASS INDEX: 32.39 KG/M2 | RESPIRATION RATE: 18 BRPM | WEIGHT: 176 LBS

## 2023-04-18 VITALS
SYSTOLIC BLOOD PRESSURE: 136 MMHG | HEART RATE: 88 BPM | OXYGEN SATURATION: 98 % | RESPIRATION RATE: 17 BRPM | DIASTOLIC BLOOD PRESSURE: 82 MMHG | TEMPERATURE: 98 F

## 2023-04-18 VITALS
SYSTOLIC BLOOD PRESSURE: 154 MMHG | RESPIRATION RATE: 16 BRPM | HEIGHT: 62 IN | DIASTOLIC BLOOD PRESSURE: 85 MMHG | WEIGHT: 175.93 LBS | TEMPERATURE: 98 F | OXYGEN SATURATION: 98 % | HEART RATE: 96 BPM

## 2023-04-18 DIAGNOSIS — C50.912 MALIGNANT NEOPLASM OF UNSPECIFIED SITE OF LEFT FEMALE BREAST: ICD-10-CM

## 2023-04-18 DIAGNOSIS — Z86.79 PERSONAL HISTORY OF OTHER DISEASES OF THE CIRCULATORY SYSTEM: ICD-10-CM

## 2023-04-18 DIAGNOSIS — N64.89 OTHER SPECIFIED DISORDERS OF BREAST: ICD-10-CM

## 2023-04-18 PROCEDURE — 93306 TTE W/DOPPLER COMPLETE: CPT | Mod: 26

## 2023-04-18 PROCEDURE — 99213 OFFICE O/P EST LOW 20 MIN: CPT

## 2023-04-18 PROCEDURE — 96417 CHEMO IV INFUS EACH ADDL SEQ: CPT

## 2023-04-18 PROCEDURE — 96413 CHEMO IV INFUSION 1 HR: CPT

## 2023-04-18 PROCEDURE — 93306 TTE W/DOPPLER COMPLETE: CPT

## 2023-04-18 PROCEDURE — 93356 MYOCRD STRAIN IMG SPCKL TRCK: CPT | Mod: 26

## 2023-04-18 PROCEDURE — 99214 OFFICE O/P EST MOD 30 MIN: CPT

## 2023-04-18 RX ORDER — TRASTUZUMAB-DKST 420 MG/20ML
468 INJECTION, POWDER, LYOPHILIZED, FOR SOLUTION INTRAVENOUS ONCE
Refills: 0 | Status: COMPLETED | OUTPATIENT
Start: 2023-04-18 | End: 2023-04-18

## 2023-04-18 RX ORDER — PERTUZUMAB 30 MG/ML
420 INJECTION, SOLUTION, CONCENTRATE INTRAVENOUS ONCE
Refills: 0 | Status: COMPLETED | OUTPATIENT
Start: 2023-04-18 | End: 2023-04-18

## 2023-04-18 RX ADMIN — TRASTUZUMAB-DKST 468 MILLIGRAM(S): 420 INJECTION, POWDER, LYOPHILIZED, FOR SOLUTION INTRAVENOUS at 13:50

## 2023-04-18 RX ADMIN — PERTUZUMAB 420 MILLIGRAM(S): 30 INJECTION, SOLUTION, CONCENTRATE INTRAVENOUS at 13:13

## 2023-04-18 RX ADMIN — PERTUZUMAB 420 MILLIGRAM(S): 30 INJECTION, SOLUTION, CONCENTRATE INTRAVENOUS at 13:45

## 2023-04-18 RX ADMIN — TRASTUZUMAB-DKST 468 MILLIGRAM(S): 420 INJECTION, POWDER, LYOPHILIZED, FOR SOLUTION INTRAVENOUS at 14:20

## 2023-04-18 NOTE — HISTORY OF PRESENT ILLNESS
[Disease: _____________________] : Disease: [unfilled] [T: ___] : T[unfilled] [N: ___] : N[unfilled] [M: ___] : M[unfilled] [AJCC Stage: ____] : AJCC Stage: [unfilled] [Treatment Protocol] : Treatment Protocol [de-identified] : 48 y/o female with h/o left breast cancer who is undergoing neoadjuvant TCHP.  Here today for f/u and Kanjinti/Pertuzumab. \par \par PMHx:  HTN, multinodular goiter, followed by ENT, had 5 biopsies that were benign, last thyroid US last year.  Denies any compressive symptoms, such as dysphagia or dyspnea.  \par \par Patient initially experienced R breast heaviness which she describes as similar to menstrual symptoms in November 2022 prompting diagnostic workup that detected a left breast cancer, invasive ductal carcinoma, ER/MA (-) HER-2 (+) with metastasis to lymph node. Left breast and axillary lymph node were both biopsied on 12/9/22.\par  \par Denies family history of breast or ovarian CA. Denies any history of breast surgeries or biopsies.\par \par 12/2/22 B/L dxMMG/US ([facility]): The left breast 0.6 x 0.5 x 0.5 cm indistinct irregular hypoechoic mass at 10:00 14 CMFN corresponds with mammography and is suspicious for malignancy. The 0.9 x 0.8 x 0.7 cm irregular complex cystic mass at 2:00 4 CMFN is indeterminate. The 1.8 x 1.2 cm abnormally thickened lymph node at 2:00 16 CMFN is suspicious for malignancy. No mammographic or sonographic evidence of malignancy in the right breast. BIRADS 5.\par \par 12/9/22 biopsy x 3:\par A. Left breast 2:00 4cmFN core biopsy: radial scar with calcifications, columnar cell change, cystic apocrine metaplasia.\par B. Left breast 2:00 16cm FN core biopsy: cortically thickened lymph node in the left axilla, metastatic carcinoma morphologically similar to part C.\par C. Left breast 10:00 14cm FN core biopsy: invasive moderately to poorly differentiated ductal carcinoma with micropapillary features, spanning at least 7 mm in this material.  Adjacent benign breast tissue with fibrocystic changes.  ER-, MA-, HER2+ \par \par 12/30/22 MRI: \par 1. Left breast cancer 10:00 (14N) (buckle clip), newly diagnosed cancer.  Measures about 6 mm.\par 2. Left breast, 12:00 (15N) (near chest wall), there is an irregular 32 x 12 x 14 mm mass corresponding to the area of distortion seen on the recent mammogram.  There is no clip in this mass.  Recommend second look ultrasound and biopsy.  If not seen on ultrasound, recommend stereotactic biopsy.\par 3. Left 2:00 position, 4cm from  nipple (top hat shaped clip) there is an irregular 5 mm mass corresponding to the biopsy showing radial scar.\par 4. Left axillary adenopathy.  Corresponds to biopsy (cork clip) showing metastatic cancer.\par \par 1/6/23 PETCT: \par 1. Multiple hypermetabolic left breast lesions and hypermetabolic left axillary lymphadenopathy, which is consistent with breast malignancy with local colton spread.  No evidence of distant FDG avid metastatic disease.\par 2. Diffuse left-sided asymmetric enlargement of the thyroid gland with heterogeneous attenuation, typical of multinodular goiter.  There is also slight rightward tracheal deviation due to mass effect without tracheal narrowing.\par \par 1/10/23 ECHO: LVEF 65-70%\par \par 1/10/23 L breast US: Left breast 12:00 14 cm from the nipple, there is an irregular 33 x 7 x 15 mm mass corresponding to MRI finding.\par US guided L breast core biopsy: Invasive ductal carcinoma, poorly-differentiated, DCIS. Concordant malignant.\par \par 2/2/23 port placement.\par \par 4/17/23 MRI breast: near complete imaging response to neoadjuvant chemotherapy.  Previously noted masses are markedly smaller with slight residual areas of enhancement around the clips.  These may represent post-chemo effect, clip artifact or some residual disease.  There is no internal mammary adenopathy.  Previously enlarged left axillary lymph nodes are smaller than noted on 12/30/22 MRI.  The largest lymph node now measures about 1.6 cm (previously 3 cm) and does not contain a clip.  The previously biopsied lymph node is no longer seen. [de-identified] : moderately to poorly invasive ductal carcinoma [de-identified] : ER-, MD- HER2+  [de-identified] : 12/27/22 ([facility]) 47 HBOC gene panel: negative for pathogenic mutations [FreeTextEntry1] : Weekly paclitaxel x 12 (1/23/23-4/11/23)\par Weekly carboplatin x 6 (1/23/23- 2/28/23); discontinued due to neutropenia.\par Kanjinti/pertuzumab every 3 weeks (1/23/23-present)

## 2023-04-24 ENCOUNTER — APPOINTMENT (OUTPATIENT)
Dept: PLASTIC SURGERY | Facility: CLINIC | Age: 48
End: 2023-04-24
Payer: COMMERCIAL

## 2023-04-24 VITALS — OXYGEN SATURATION: 98 % | HEART RATE: 72 BPM | BODY MASS INDEX: 32.57 KG/M2 | HEIGHT: 62 IN | WEIGHT: 177 LBS

## 2023-04-24 PROCEDURE — 99203 OFFICE O/P NEW LOW 30 MIN: CPT

## 2023-04-24 RX ORDER — CEPHALEXIN 500 MG/1
500 CAPSULE ORAL
Qty: 14 | Refills: 0 | Status: DISCONTINUED | COMMUNITY
Start: 2023-04-08 | End: 2023-04-24

## 2023-04-24 RX ORDER — ONDANSETRON 4 MG/1
4 TABLET ORAL
Qty: 30 | Refills: 4 | Status: DISCONTINUED | COMMUNITY
Start: 2023-01-19 | End: 2023-04-24

## 2023-04-24 RX ORDER — ACETAMINOPHEN 325 MG/1
TABLET, FILM COATED ORAL
Refills: 0 | Status: DISCONTINUED | COMMUNITY
End: 2023-04-24

## 2023-04-24 RX ORDER — LIDOCAINE AND PRILOCAINE 25; 25 MG/G; MG/G
2.5-2.5 CREAM TOPICAL
Qty: 1 | Refills: 4 | Status: DISCONTINUED | COMMUNITY
Start: 2023-01-19 | End: 2023-04-24

## 2023-04-25 NOTE — HISTORY OF PRESENT ILLNESS
[FreeTextEntry1] : 46 yo F presents for follow up of L IDC ER/TN (-) HER-2 (+) with metastasis to lymph node seen on diagnostic mammogram as 0.6 cm irregular hypoechoic mass at LEFT 10n14 and 1.8 cm abnormal lymph node both biopsied on 12/9/22. Patient initially experienced R breast symptoms which she describes as similar to menstrual symptoms in November 2022 prompting diagnostic workup that detected L breast cancer. Of note, third biopsy also performed LEFT 2n4 showing radial scar with calcifications. \par \par Of note, left posterior cervical lymphadenopathy noted on physical exam during first office visit, FNA performed Feb 2023 negative for malignant cells.\par \par Patient underwent chemotherapy/immunotherapy with Dr. Chavis, currently receiving immunotherapy. B/L MRI performed 4/12/23 showed near complete imaging response to therapy. \par \par 3 clips noted in the left breast and one in the left axilla (4 total clips).\par 1. Biopsy proven metastasis to a left axillary lymph node (cork clip)\par 2. Biopsy proven left IDC 10:00 (14N)(buckle clip)\par 3. Biopsy proven radial scar left 2:00 (4N)(top hat)\par 4. Biopsy left 12:00(15N)(ribbon) = IDC\par \par Denies family history of breast or ovarian CA. [facility] 47 gene panel negative.\par \par Patient with history of HTN. Denies history of heart disease, DM, blood clots, sleep apnea, COPD, loose/missing/infected teeth, issues with anesthesia. Patient denies any known drug allergies.

## 2023-04-25 NOTE — ASSESSMENT
[FreeTextEntry1] : 46 yo F presents for follow up of L IDC ER/AL (-) HER-2 (+) with metastasis to lymph node seen on diagnostic mammogram as 0.6 cm irregular hypoechoic mass at LEFT 10n14 and 1.8 cm abnormal lymph node both biopsied on 12/9/22. Of note, third biopsy also performed LEFT 2n4 showing radial scar with calcifications. \par \par Of note, left posterior cervical lymphadenopathy noted on physical exam during first office visit, FNA performed Feb 2023 negative for malignant cells.\par \par Patient underwent neoadjuvant chemotherapy with Dr. Chavis, currently receiving immunotherapy. B/L MRI performed 4/17/23 showed near complete imaging response to therapy. [facility] 47 gene panel negative.\par \par 3 clips noted in the left breast and one in the left axilla (4 total clips).\par 1. Biopsy proven metastasis to a left axillary lymph node (cork clip)\par 2. Biopsy proven left IDC 10:00 (14N)(buckle clip)\par 3. Biopsy proven radial scar left 2:00 (4N)(top hat)\par 4. Biopsy left 12:00(15N)(ribbon) = IDC\par \par Discussed patient's diagnosis in detail and answered any questions. The use of multi-modal therapy for the treatment of breast cancer was discussed. Surgical options were reviewed including a double lumpectomy to negative margins, with or without reduction with whole breast radiation therapy versus a mastectomy with or without reconstruction. Mastectomy techniques were discussed in detail including skin sparing and nipple sparing techniques. Reconstructive options were briefly reviewed, including implant based versus tissue flap based reconstruction options.\par \par Patient will meet with plastic surgeon next week to discuss reconstruction options. Patient will return for surgical planning in 3 weeks. Patient verbalizes understanding and is in agreement with the plan.

## 2023-04-25 NOTE — HISTORY OF PRESENT ILLNESS
[FreeTextEntry1] : 46 y/o female with newly diagnosed invasive ductal carcinoma of left breast referred by Dr. Leal presents for initial consultation to discuss breast reconstruction options following segmental resection vs mastectomy. Patient has history of radial scar of left breast. She completed chemotherapy 2 weeks ago, currently receiving immunotherapy. Patient had genetic testing done, results negative. No family history of breast or ovarian cancer.\par No personal or family history of bleeding/clotting disorder.\par \par PE: b/l pendulous breasts, macromastia, no nipple discharge, no nipple retraction. grade II/III ptosis\par \par Today we discussed all options for breast reconstruction including no reconstruction and reconstruction using tissue expanders and implants, as well as autologous reconstruction vs local tissue rearrangement following segmental resection and right breast reduction for symmetry. The nature of each surgery, its risks, benefits, alternatives, expected postoperative course, recovery and long term results were discussed in detail. All questions were answered.\par \par Ms. Joseph is most interested in undergoing segmental excision with breast conservation with Dr. Leal, and reconstruction with breast tissue rearrangement and contralateral breast reduction for symmetry.\par

## 2023-04-25 NOTE — DATA REVIEWED
[FreeTextEntry1] : 12/2/22 B/L dxMMG/US (R): The left breast 0.6 x 0.5 x 0.5 cm indistinct irregular hypoechoic mass at 10:00 14 CMFN corresponds with mammography and is suspicious for malignancy. Left breast ultrasound-guided core biopsy with microclip placement and postprocedural mammographic correlation is recommended. The 0.9 x 0.8 x 0.7 cm irregular complex cystic mass at 2:00 4 CMFN is indeterminate. Left breast ultrasound-guided core biopsy with microclip placement and postprocedural mammographic correlation is recommended. The 1.8 x 1.2 cm abnormally thickened lymph node at 2:00 16 CMFN is suspicious for malignancy. Left breast ultrasound-guided core biopsy with microclip placement and postprocedural mammographic correlation is recommended. No mammographic or sonographic evidence of malignancy in the right breast. BIRADS 5.\par \par 12/27/22 ([facility]) 47 HBOC gene panel: negative for pathogenic mutations. \par \par 12/30/22 ([facility]) B/L MRI: \par 1. Left breast cancer 10:00 14cmfn (buckle clip) , newly diagnosed cancer. Measures about 0.6 cm.\par 2. Left breast, 12:00 15cmfn (near chest wall), there is an irregular 3.2 x 1.2 x 1.4 cm mass corresponding to the area of distortion seen on the recent mammogram. There is no clip in this mass. Recommend second look ultrasound and biopsy. If not seen on ultrasound, recommend stereotactic biopsy. \par 3. Left 2:00 position 4cmfn (top-hat shaped clip) there is an irregular 0.5 cm mass corresponding to the biopsy showing radial scar.\par 4. Left axillary adenopathy. Corresponds to biopsy (cork clip) showing metastatic cancer.\par RECOMMENDATION: Ultrasound biopsy. BI-RADS 4C- Suspicious Finding(s) - High Suspicion for Malignancy \par \par 1/10/23 L Targeted US ([facility]): LEFT breast 12:00 14 cm from the nipple, there is an irregular 33 x 7 x 15 mm mass corresponding to the MRI finding. This was targeted for subsequent ultrasound biopsy performed today. BIRADS 4.\par \par 1/10/23 L US Guided Biopsy ([facility]): L 12:00 14cmFN pathology shows Invasive ductal carcinoma, poorly-differentiated, DCIS. Concordant malignant.\par \par 2/2/23 L Posterior Cervical LN FNA ([facility]): NEGATIVE FOR MALIGNANT CELLS. Heterogeneous population of lymphocytes. No epithelial cells visualized. Cell block, noncontributory. Note: Flow cytometry will be reported in an addendum. \par \par 4/17/23 B/L MRI ([facility]): LEFT BREAST:\par 3 clips noted in the left breast and one in the left axilla (4 total clips).\par 1. Biopsy proven metastasis to a left axillary lymph node (cork clip)\par 2. Biopsy proven left IDC 10:00 (14N)(buckle clip)\par 3. Biopsy proven radial scar left 2:00 (4N)(top hat)\par 4. Biopsy left 12:00(15N)(ribbon) = IDC\par \par Previously noted masses are markedly smaller, with slight residual areas of enhancement around the clips.  These may represent post-chemo effect, clip artifact or some residual disease.\par \par

## 2023-04-25 NOTE — REASON FOR VISIT
[Follow-Up: _____] : a [unfilled] follow-up visit [FreeTextEntry1] : Surgical discussion, s/p neoadjuvant therapy

## 2023-04-25 NOTE — PAST MEDICAL HISTORY
[Definite ___ (Date)] : the last menstrual period was [unfilled] [History of Hormone Replacement Treatment] : has no history of hormone replacement treatment [FreeTextEntry6] : no [FreeTextEntry7] : no [FreeTextEntry8] : yes

## 2023-04-25 NOTE — PHYSICAL EXAM
[No Supraclavicular Adenopathy] : no supraclavicular adenopathy [de-identified] : Posterior cervical chain palpable lymph node  [de-identified] : No discrete masses [de-identified] : Palpable lymphadenopathy

## 2023-05-02 ENCOUNTER — APPOINTMENT (OUTPATIENT)
Dept: BREAST CENTER | Facility: CLINIC | Age: 48
End: 2023-05-02
Payer: COMMERCIAL

## 2023-05-02 VITALS
SYSTOLIC BLOOD PRESSURE: 136 MMHG | HEART RATE: 83 BPM | BODY MASS INDEX: 32.2 KG/M2 | HEIGHT: 62 IN | DIASTOLIC BLOOD PRESSURE: 90 MMHG | WEIGHT: 175 LBS

## 2023-05-02 PROCEDURE — 99214 OFFICE O/P EST MOD 30 MIN: CPT

## 2023-05-05 NOTE — HISTORY OF PRESENT ILLNESS
[FreeTextEntry1] : 46 yo F presents to discuss surgical management of recently diagnosed L IDC ER/NM (-) HER-2 (+) with metastasis to lymph node seen on diagnostic mammogram as 0.6 cm irregular hypoechoic mass at LEFT 10n14 and 1.8 cm abnormal lymph node both biopsied on 12/9/22. Patient initially experienced R breast symptoms which she describes as similar to menstrual symptoms in November 2022 prompting diagnostic workup that detected L breast cancer. Of note, third biopsy also performed LEFT 2n4 showing radial scar with calcifications. \par \par Since LOV, patient has met with Dr. Abbasi plastic surgery and is most interested in undergoing segmental excision with breast conservation and reconstruction with breast tissue rearrangement and contralateral breast reduction for symmetry.\par \par Of note, left posterior cervical lymphadenopathy noted on physical exam during first office visit, FNA performed Feb 2023 negative for malignant cells.\par \par Patient underwent chemotherapy/immunotherapy with Dr. Chavis, currently receiving immunotherapy. B/L MRI performed 4/12/23 showed near complete imaging response to therapy. \par \par 3 clips noted in the left breast and one in the left axilla (4 total clips).\par 1. Biopsy proven metastasis to a left axillary lymph node (cork clip)\par 2. Biopsy proven left IDC 10:00 (14N)(buckle clip)\par 3. Biopsy proven radial scar left 2:00 (4N)(top hat)\par 4. Biopsy left 12:00(15N)(ribbon) = IDC\par \par Denies family history of breast or ovarian CA. [facility] 47 gene panel negative.\par \par Patient with history of HTN. Denies history of heart disease, DM, blood clots, sleep apnea, COPD, loose/missing/infected teeth, issues with anesthesia. Patient denies any known drug allergies.

## 2023-05-05 NOTE — DATA REVIEWED
[FreeTextEntry1] : 12/2/22 B/L dxMMG/US (R): The left breast 0.6 x 0.5 x 0.5 cm indistinct irregular hypoechoic mass at 10:00 14 CMFN corresponds with mammography and is suspicious for malignancy. Left breast ultrasound-guided core biopsy with microclip placement and postprocedural mammographic correlation is recommended. The 0.9 x 0.8 x 0.7 cm irregular complex cystic mass at 2:00 4 CMFN is indeterminate. Left breast ultrasound-guided core biopsy with microclip placement and postprocedural mammographic correlation is recommended. The 1.8 x 1.2 cm abnormally thickened lymph node at 2:00 16 CMFN is suspicious for malignancy. Left breast ultrasound-guided core biopsy with microclip placement and postprocedural mammographic correlation is recommended. No mammographic or sonographic evidence of malignancy in the right breast. BIRADS 5.\par \par 12/27/22 ([facility]) 47 HBOC gene panel: negative for pathogenic mutations. \par \par 12/30/22 ([facility]) B/L MRI: \par 1. Left breast cancer 10:00 14cmfn (buckle clip) , newly diagnosed cancer. Measures about 0.6 cm.\par 2. Left breast, 12:00 15cmfn (near chest wall), there is an irregular 3.2 x 1.2 x 1.4 cm mass corresponding to the area of distortion seen on the recent mammogram. There is no clip in this mass. Recommend second look ultrasound and biopsy. If not seen on ultrasound, recommend stereotactic biopsy. \par 3. Left 2:00 position 4cmfn (top-hat shaped clip) there is an irregular 0.5 cm mass corresponding to the biopsy showing radial scar.\par 4. Left axillary adenopathy. Corresponds to biopsy (cork clip) showing metastatic cancer.\par RECOMMENDATION: Ultrasound biopsy. BI-RADS 4C- Suspicious Finding(s) - High Suspicion for Malignancy \par \par 1/10/23 L Targeted US ([facility]): LEFT breast 12:00 14 cm from the nipple, there is an irregular 33 x 7 x 15 mm mass corresponding to the MRI finding. This was targeted for subsequent ultrasound biopsy performed today. BIRADS 4.\par \par 1/10/23 L US Guided Biopsy ([facility]): L 12:00 14cmFN pathology shows Invasive ductal carcinoma, poorly-differentiated, DCIS. Concordant malignant.\par \par 2/2/23 L Posterior Cervical LN FNA ([facility]): NEGATIVE FOR MALIGNANT CELLS. Heterogeneous population of lymphocytes. No epithelial cells visualized. Cell block, noncontributory. Note: Flow cytometry will be reported in an addendum. \par \par 4/17/23 B/L MRI ([facility]): LEFT BREAST:\par 3 clips noted in the left breast and one in the left axilla (4 total clips).\par 1. Biopsy proven metastasis to a left axillary lymph node (cork clip)\par 2. Biopsy proven left IDC 10:00 (14N)(buckle clip)\par 3. Biopsy proven radial scar left 2:00 (4N)(top hat)\par 4. Biopsy left 12:00(15N)(ribbon) = IDC\par \par Previously noted masses are markedly smaller, with slight residual areas of enhancement around the clips.  These may represent post-chemo effect, clip artifact or some residual disease.\par \par

## 2023-05-05 NOTE — PHYSICAL EXAM
[No Supraclavicular Adenopathy] : no supraclavicular adenopathy [Examined in the supine and seated position] : examined in the supine and seated position [Asymmetrical] : asymmetrical [No dominant masses] : no dominant masses in right breast  [No Nipple Retraction] : no left nipple retraction [No Nipple Discharge] : no left nipple discharge [No Axillary Lymphadenopathy] : no right axillary lymphadenopathy [de-identified] : Posterior cervical chain palpable lymph node  [de-identified] : No discrete masses [de-identified] : Palpable lymphadenopathy

## 2023-05-05 NOTE — PAST MEDICAL HISTORY
[Menstruating] : The patient is menstruating [Menarche Age ____] : age at menarche was [unfilled] [Regular Cycle Intervals] : have been regular [Total Preg ___] : G[unfilled] [Live Births ___] : P[unfilled]  [Premature ___] : Premature: [unfilled] [Age At Live Birth ___] : Age at live birth: [unfilled] [Definite ___ (Date)] : the last menstrual period was [unfilled] [History of Hormone Replacement Treatment] : has no history of hormone replacement treatment [FreeTextEntry6] : no [FreeTextEntry7] : no [FreeTextEntry8] : yes

## 2023-05-09 ENCOUNTER — APPOINTMENT (OUTPATIENT)
Dept: INFUSION THERAPY | Facility: CLINIC | Age: 48
End: 2023-05-09

## 2023-05-09 ENCOUNTER — OUTPATIENT (OUTPATIENT)
Dept: OUTPATIENT SERVICES | Facility: HOSPITAL | Age: 48
LOS: 1 days | End: 2023-05-09
Payer: COMMERCIAL

## 2023-05-09 VITALS
OXYGEN SATURATION: 100 % | HEART RATE: 61 BPM | HEIGHT: 62 IN | TEMPERATURE: 97 F | RESPIRATION RATE: 18 BRPM | SYSTOLIC BLOOD PRESSURE: 142 MMHG | WEIGHT: 173.94 LBS | DIASTOLIC BLOOD PRESSURE: 94 MMHG

## 2023-05-09 DIAGNOSIS — C50.912 MALIGNANT NEOPLASM OF UNSPECIFIED SITE OF LEFT FEMALE BREAST: ICD-10-CM

## 2023-05-09 PROCEDURE — 96417 CHEMO IV INFUS EACH ADDL SEQ: CPT

## 2023-05-09 PROCEDURE — 96413 CHEMO IV INFUSION 1 HR: CPT

## 2023-05-09 RX ORDER — TRASTUZUMAB-DKST 420 MG/20ML
468 INJECTION, POWDER, LYOPHILIZED, FOR SOLUTION INTRAVENOUS ONCE
Refills: 0 | Status: COMPLETED | OUTPATIENT
Start: 2023-05-09 | End: 2023-05-09

## 2023-05-09 RX ORDER — PERTUZUMAB 30 MG/ML
420 INJECTION, SOLUTION, CONCENTRATE INTRAVENOUS ONCE
Refills: 0 | Status: COMPLETED | OUTPATIENT
Start: 2023-05-09 | End: 2023-05-09

## 2023-05-09 RX ADMIN — TRASTUZUMAB-DKST 468 MILLIGRAM(S): 420 INJECTION, POWDER, LYOPHILIZED, FOR SOLUTION INTRAVENOUS at 12:40

## 2023-05-09 RX ADMIN — PERTUZUMAB 420 MILLIGRAM(S): 30 INJECTION, SOLUTION, CONCENTRATE INTRAVENOUS at 12:30

## 2023-05-09 RX ADMIN — PERTUZUMAB 420 MILLIGRAM(S): 30 INJECTION, SOLUTION, CONCENTRATE INTRAVENOUS at 12:00

## 2023-05-09 RX ADMIN — Medication 300 UNIT(S): at 13:15

## 2023-05-09 RX ADMIN — TRASTUZUMAB-DKST 468 MILLIGRAM(S): 420 INJECTION, POWDER, LYOPHILIZED, FOR SOLUTION INTRAVENOUS at 13:10

## 2023-05-10 ENCOUNTER — APPOINTMENT (OUTPATIENT)
Dept: PLASTIC SURGERY | Facility: CLINIC | Age: 48
End: 2023-05-10
Payer: COMMERCIAL

## 2023-05-10 DIAGNOSIS — Z42.1 ENCOUNTER FOR BREAST RECONSTRUCTION FOLLOWING MASTECTOMY: ICD-10-CM

## 2023-05-10 PROCEDURE — 99213 OFFICE O/P EST LOW 20 MIN: CPT | Mod: 95

## 2023-05-10 NOTE — HISTORY OF PRESENT ILLNESS
[Home] : at home, [unfilled] , at the time of the visit. [Other Location: e.g. Home (Enter Location, City,State)___] : at [unfilled] [Verbal consent obtained from patient] : the patient, [unfilled] [FreeTextEntry1] : Patient Name: ДМИТРИЙ FOX \par : 1975 \par Date: 2023 \par Attending: Dr. Nehemiah Abbasi\par \par HPI: preop consultation for left breast local tissue rearrangement and contralateral reduction for symmetry on 23.\par \par Allergies: NKDA\par Medication prescribed: percocet 5-325 mg\par \par ROS: complete 14 point review of systems negative except pertinent items reviewed in the HPI. Other non-contributory items reviewed in our new patient questionnaire and we have submitted it to be scanned into the medical record. \par \par Physical Exam: \par completed at time of in office evaluation \par \par Assessment/Plan:\par We have discussed pre and postop instructions, recovery limitations, restrictions and expectations, ERAS protocol, NPO status, transportation home, postop medications, benefits and risks of the procedure. Pre-op labs reviewed. The patient would like to proceed with surgery as scheduled.\par \par ALE BRAGA NP\par \par

## 2023-05-12 ENCOUNTER — APPOINTMENT (OUTPATIENT)
Dept: INTERNAL MEDICINE | Facility: CLINIC | Age: 48
End: 2023-05-12
Payer: COMMERCIAL

## 2023-05-12 VITALS
SYSTOLIC BLOOD PRESSURE: 143 MMHG | HEART RATE: 73 BPM | BODY MASS INDEX: 31.65 KG/M2 | WEIGHT: 172 LBS | OXYGEN SATURATION: 97 % | DIASTOLIC BLOOD PRESSURE: 90 MMHG | HEIGHT: 62 IN | TEMPERATURE: 96.6 F

## 2023-05-12 DIAGNOSIS — Z01.818 ENCOUNTER FOR OTHER PREPROCEDURAL EXAMINATION: ICD-10-CM

## 2023-05-12 PROCEDURE — 99204 OFFICE O/P NEW MOD 45 MIN: CPT

## 2023-05-12 NOTE — PHYSICAL EXAM
[No Carotid Bruits] : no carotid bruits [No Edema] : there was no peripheral edema [Coordination Grossly Intact] : coordination grossly intact [No Focal Deficits] : no focal deficits [Normal] : affect was normal and insight and judgment were intact

## 2023-05-15 LAB
ALBUMIN SERPL ELPH-MCNC: 4.4 G/DL
ALP BLD-CCNC: 85 U/L
ALT SERPL-CCNC: 14 U/L
ANION GAP SERPL CALC-SCNC: 12 MMOL/L
APTT BLD: 36.7 SEC
AST SERPL-CCNC: 18 U/L
BASOPHILS # BLD AUTO: 0.04 K/UL
BASOPHILS NFR BLD AUTO: 0.6 %
BILIRUB SERPL-MCNC: 0.7 MG/DL
BUN SERPL-MCNC: 9 MG/DL
CALCIUM SERPL-MCNC: 9.7 MG/DL
CHLORIDE SERPL-SCNC: 104 MMOL/L
CO2 SERPL-SCNC: 28 MMOL/L
CREAT SERPL-MCNC: 0.77 MG/DL
EGFR: 97 ML/MIN/1.73M2
EOSINOPHIL # BLD AUTO: 0.5 K/UL
EOSINOPHIL NFR BLD AUTO: 7.9 %
GLUCOSE SERPL-MCNC: 97 MG/DL
HCT VFR BLD CALC: 34.3 %
HGB BLD-MCNC: 11 G/DL
IMM GRANULOCYTES NFR BLD AUTO: 0.2 %
INR PPP: 1.13 RATIO
LYMPHOCYTES # BLD AUTO: 3.28 K/UL
LYMPHOCYTES NFR BLD AUTO: 52.1 %
MAN DIFF?: NORMAL
MCHC RBC-ENTMCNC: 30 PG
MCHC RBC-ENTMCNC: 32.1 GM/DL
MCV RBC AUTO: 93.5 FL
MONOCYTES # BLD AUTO: 0.6 K/UL
MONOCYTES NFR BLD AUTO: 9.5 %
NEUTROPHILS # BLD AUTO: 1.86 K/UL
NEUTROPHILS NFR BLD AUTO: 29.7 %
PLATELET # BLD AUTO: 304 K/UL
POTASSIUM SERPL-SCNC: 4.3 MMOL/L
PROT SERPL-MCNC: 7.8 G/DL
PT BLD: 13.1 SEC
RBC # BLD: 3.67 M/UL
RBC # FLD: 17 %
SODIUM SERPL-SCNC: 144 MMOL/L
WBC # FLD AUTO: 6.29 K/UL

## 2023-05-15 NOTE — HISTORY OF PRESENT ILLNESS
[Home] : at home, [unfilled] , at the time of the visit. [Other Location: e.g. Home (Enter Location, City,State)___] : at [unfilled] [Verbal consent obtained from patient] : the patient, [unfilled] [No Pertinent Cardiac History] : no history of aortic stenosis, atrial fibrillation, coronary artery disease, recent myocardial infarction, or implantable device/pacemaker [No Pertinent Pulmonary History] : no history of asthma, COPD, sleep apnea, or smoking [No Adverse Anesthesia Reaction] : no adverse anesthesia reaction in self or family member [(Patient denies any chest pain, claudication, dyspnea on exertion, orthopnea, palpitations or syncope)] : Patient denies any chest pain, claudication, dyspnea on exertion, orthopnea, palpitations or syncope [Good (7-10 METs)] : Good (7-10 METs) [Chronic Anticoagulation] : no chronic anticoagulation [Chronic Kidney Disease] : no chronic kidney disease [Diabetes] : no diabetes [FreeTextEntry1] : L double localized lumpectomy, L targeted SLNB, L SLNB with frozen section and possible L axillary dissection with reconstruction [FreeTextEntry2] : 5/19/23 [FreeTextEntry3] : Dr. Leal [FreeTextEntry4] : 48 yo with pmhx Invasive ductal carcinoma of L breast who presents for preop L double localized lumpectomy, L targeted SLNB, L SLNB with frozen section and possible L axillary dissection with reconstruction\par Overall feels well, no new acute complaints today \par Psx- breast bx\par Denies adverse reaction to anesthesia \par No decrease in exercise tolerance\par Endorses being able to walk>5 blocks, >2 staircases with no CP, CHUNG, SOB, Palpitations \par Able to perform ADLs independently \par Herbal supplements and OTC meds: none\par Nsaids: none\par Tobacco use: none\par ETOH use: none\par Illicit drug use: none\par Medications: amlodipine [FreeTextEntry7] : ECHO 4/18 reviewed- unchanged from prior, normal ventricular function, no pHTN, mild TR\par No ACS sx present

## 2023-05-15 NOTE — ASSESSMENT
[High Risk Surgery - Intraperitoneal, Intrathoracic or Supringuinal Vascular Procedures] : High Risk Surgery - Intraperitoneal, Intrathoracic or Supringuinal Vascular Procedures - No (0) [Ischemic Heart Disease] : Ischemic Heart Disease - No (0) [Congestive Heart Failure] : Congestive Heart Failure - No (0) [Prior Cerebrovascular Accident or TIA] : Prior Cerebrovascular Accident or TIA - No (0) [Creatinine >= 2mg/dL (1 Point)] : Creatinine >= 2mg/dL - No (0) [Insulin-dependent Diabetic (1 Point)] : Insulin-dependent Diabetic - No (0) [0] : 0 , RCRI Class: I, Risk of Post-Op Cardiac Complications: 3.9%, 95% CI for Risk Estimate: 2.8% - 5.4% [As per surgery] : as per surgery [FreeTextEntry4] : 46 yo F with pmhx of HTN who presents for preop \par Notes norvasc was recently increased to 5mg this week, will cw amlodipine 5mg QD\par Ji 0.0% MIGUEL \par Mets>4, performs ADL independently \par Medically optimized for surgery pending labs

## 2023-05-15 NOTE — ADDENDUM
[FreeTextEntry1] : Labs reviewed- with anemia, however improved from prior, pt denied melena, hematochezia, blood in urine, PTT elevated however INR acceptable\par Medically optimized for upcoming procedure

## 2023-05-15 NOTE — HISTORY OF PRESENT ILLNESS
[Home] : at home, [unfilled] , at the time of the visit. [Other Location: e.g. Home (Enter Location, City,State)___] : at [unfilled] [Verbal consent obtained from patient] : the patient, [unfilled] [No Pertinent Cardiac History] : no history of aortic stenosis, atrial fibrillation, coronary artery disease, recent myocardial infarction, or implantable device/pacemaker [No Pertinent Pulmonary History] : no history of asthma, COPD, sleep apnea, or smoking [No Adverse Anesthesia Reaction] : no adverse anesthesia reaction in self or family member [(Patient denies any chest pain, claudication, dyspnea on exertion, orthopnea, palpitations or syncope)] : Patient denies any chest pain, claudication, dyspnea on exertion, orthopnea, palpitations or syncope [Good (7-10 METs)] : Good (7-10 METs) [Chronic Anticoagulation] : no chronic anticoagulation [Chronic Kidney Disease] : no chronic kidney disease [Diabetes] : no diabetes [FreeTextEntry1] : L double localized lumpectomy, L targeted SLNB, L SLNB with frozen section and possible L axillary dissection with reconstruction [FreeTextEntry2] : 5/19/23 [FreeTextEntry3] : Dr. Leal [FreeTextEntry4] : 46 yo with pmhx Invasive ductal carcinoma of L breast who presents for preop L double localized lumpectomy, L targeted SLNB, L SLNB with frozen section and possible L axillary dissection with reconstruction\par Overall feels well, no new acute complaints today \par Psx- breast bx\par Denies adverse reaction to anesthesia \par No decrease in exercise tolerance\par Endorses being able to walk>5 blocks, >2 staircases with no CP, CHUNG, SOB, Palpitations \par Able to perform ADLs independently \par Herbal supplements and OTC meds: none\par Nsaids: none\par Tobacco use: none\par ETOH use: none\par Illicit drug use: none\par Medications: amlodipine [FreeTextEntry7] : ECHO 4/18 reviewed- unchanged from prior, normal ventricular function, no pHTN, mild TR\par No ACS sx present

## 2023-05-15 NOTE — HISTORY OF PRESENT ILLNESS
[Home] : at home, [unfilled] , at the time of the visit. [Other Location: e.g. Home (Enter Location, City,State)___] : at [unfilled] [Verbal consent obtained from patient] : the patient, [unfilled] [No Pertinent Cardiac History] : no history of aortic stenosis, atrial fibrillation, coronary artery disease, recent myocardial infarction, or implantable device/pacemaker [No Pertinent Pulmonary History] : no history of asthma, COPD, sleep apnea, or smoking [No Adverse Anesthesia Reaction] : no adverse anesthesia reaction in self or family member [(Patient denies any chest pain, claudication, dyspnea on exertion, orthopnea, palpitations or syncope)] : Patient denies any chest pain, claudication, dyspnea on exertion, orthopnea, palpitations or syncope [Good (7-10 METs)] : Good (7-10 METs) [Chronic Anticoagulation] : no chronic anticoagulation [Chronic Kidney Disease] : no chronic kidney disease [Diabetes] : no diabetes [FreeTextEntry1] : L double localized lumpectomy, L targeted SLNB, L SLNB with frozen section and possible L axillary dissection with reconstruction [FreeTextEntry2] : 5/19/23 [FreeTextEntry3] : Dr. eLal [FreeTextEntry4] : 48 yo with pmhx Invasive ductal carcinoma of L breast who presents for preop L double localized lumpectomy, L targeted SLNB, L SLNB with frozen section and possible L axillary dissection with reconstruction\par Overall feels well, no new acute complaints today \par Psx- breast bx\par Denies adverse reaction to anesthesia \par No decrease in exercise tolerance\par Endorses being able to walk>5 blocks, >2 staircases with no CP, CHUNG, SOB, Palpitations \par Able to perform ADLs independently \par Herbal supplements and OTC meds: none\par Nsaids: none\par Tobacco use: none\par ETOH use: none\par Illicit drug use: none\par Medications: amlodipine [FreeTextEntry7] : ECHO 4/18 reviewed- unchanged from prior, normal ventricular function, no pHTN, mild TR\par No ACS sx present

## 2023-05-15 NOTE — ASSESSMENT
[High Risk Surgery - Intraperitoneal, Intrathoracic or Supringuinal Vascular Procedures] : High Risk Surgery - Intraperitoneal, Intrathoracic or Supringuinal Vascular Procedures - No (0) [Ischemic Heart Disease] : Ischemic Heart Disease - No (0) [Congestive Heart Failure] : Congestive Heart Failure - No (0) [Prior Cerebrovascular Accident or TIA] : Prior Cerebrovascular Accident or TIA - No (0) [Creatinine >= 2mg/dL (1 Point)] : Creatinine >= 2mg/dL - No (0) [Insulin-dependent Diabetic (1 Point)] : Insulin-dependent Diabetic - No (0) [0] : 0 , RCRI Class: I, Risk of Post-Op Cardiac Complications: 3.9%, 95% CI for Risk Estimate: 2.8% - 5.4% [As per surgery] : as per surgery [FreeTextEntry4] : 48 yo F with pmhx of HTN who presents for preop \par Notes norvasc was recently increased to 5mg this week, will cw amlodipine 5mg QD\par Ji 0.0% MIGUEL \par Mets>4, performs ADL independently \par Medically optimized for surgery pending labs

## 2023-05-16 ENCOUNTER — APPOINTMENT (OUTPATIENT)
Dept: MAMMOGRAPHY | Facility: HOSPITAL | Age: 48
End: 2023-05-16
Payer: COMMERCIAL

## 2023-05-16 ENCOUNTER — APPOINTMENT (OUTPATIENT)
Dept: ULTRASOUND IMAGING | Facility: HOSPITAL | Age: 48
End: 2023-05-16
Payer: COMMERCIAL

## 2023-05-16 ENCOUNTER — RESULT REVIEW (OUTPATIENT)
Age: 48
End: 2023-05-16

## 2023-05-16 ENCOUNTER — OUTPATIENT (OUTPATIENT)
Dept: OUTPATIENT SERVICES | Facility: HOSPITAL | Age: 48
LOS: 1 days | End: 2023-05-16
Payer: COMMERCIAL

## 2023-05-16 PROCEDURE — 19282 PERQ DEVICE BREAST EA IMAG: CPT | Mod: LT

## 2023-05-16 PROCEDURE — 19281 PERQ DEVICE BREAST 1ST IMAG: CPT

## 2023-05-16 PROCEDURE — 19285 PERQ DEV BREAST 1ST US IMAG: CPT | Mod: LT,59

## 2023-05-16 PROCEDURE — 19281 PERQ DEVICE BREAST 1ST IMAG: CPT | Mod: LT,59

## 2023-05-16 PROCEDURE — A4648: CPT

## 2023-05-16 PROCEDURE — 19282 PERQ DEVICE BREAST EA IMAG: CPT

## 2023-05-16 PROCEDURE — 19285 PERQ DEV BREAST 1ST US IMAG: CPT

## 2023-05-17 RX ORDER — AMLODIPINE BESYLATE 2.5 MG/1
1 TABLET ORAL
Qty: 0 | Refills: 0 | DISCHARGE

## 2023-05-17 NOTE — ASU PATIENT PROFILE, ADULT - FALL HARM RISK - UNIVERSAL INTERVENTIONS
Bed in lowest position, wheels locked, appropriate side rails in place/Call bell, personal items and telephone in reach/Instruct patient to call for assistance before getting out of bed or chair/Non-slip footwear when patient is out of bed/[place] to call system/Physically safe environment - no spills, clutter or unnecessary equipment/Purposeful Proactive Rounding/Room/bathroom lighting operational, light cord in reach

## 2023-05-17 NOTE — ASU PATIENT PROFILE, ADULT - ARRIVAL TIME
CHIEF COMPLAINT: ADHD check    SUBJECTIVE: Maribell is back for follow-up on her concentration issues. She was referred to behavioral health for special testing and her therapist agrees that she has ADHD and she needs to be on treatment. She doesn't have aggressiveness or hyperactivity and it is more attention deficit issues. She has mixed anxiety with her symptoms that could be correlated to her ADHD. She denies any other symptoms for today and is not suicidal, homicidal and denies sleep disturbances besides what she discussed last time.  OBJECTIVE:  VITAL SIGNS:   Visit Vitals  /70 (BP Location: [facility] [place], Patient Position: Sitting, Cuff Size: Regular)   Pulse 89   Temp 98 °F (36.7 °C) (Oral)   Ht 5' 6\" (1.676 m)   Wt 73 kg   LMP 09/02/2017   SpO2 100%   BMI 25.99 kg/m²   Physical Exam   Constitutional: She is oriented to person, place, and time and well-developed, well-nourished, and in no distress.   Cardiovascular: Normal rate and regular rhythm.    Pulmonary/Chest: Effort normal and breath sounds normal.   Neurological: She is alert and oriented to person, place, and time.   Psychiatric: Mood, memory, affect and judgment normal.   Nursing note and vitals reviewed.        ASSESSMENT and PLAN:  (F90.0) Attention deficit hyperactivity disorder (ADHD), predominantly inattentive type  (primary encounter diagnosis)  Plan: amphetamine-dextroamphetamine (ADDERALL XR) 20         MG 24 hr capsule  Adderall 20 MG extended release was started and patient was warned about side effects. Patient needs to return in 4 weeks for reevaluation and adjustment of medication if needed.         05:30

## 2023-05-18 ENCOUNTER — OUTPATIENT (OUTPATIENT)
Dept: OUTPATIENT SERVICES | Facility: HOSPITAL | Age: 48
LOS: 1 days | End: 2023-05-18
Payer: COMMERCIAL

## 2023-05-18 ENCOUNTER — TRANSCRIPTION ENCOUNTER (OUTPATIENT)
Age: 48
End: 2023-05-18

## 2023-05-18 ENCOUNTER — APPOINTMENT (OUTPATIENT)
Dept: NUCLEAR MEDICINE | Facility: HOSPITAL | Age: 48
End: 2023-05-18
Payer: COMMERCIAL

## 2023-05-18 PROCEDURE — 78195 LYMPH SYSTEM IMAGING: CPT

## 2023-05-18 PROCEDURE — 78195 LYMPH SYSTEM IMAGING: CPT | Mod: 26

## 2023-05-18 PROCEDURE — A9541: CPT

## 2023-05-19 ENCOUNTER — TRANSCRIPTION ENCOUNTER (OUTPATIENT)
Age: 48
End: 2023-05-19

## 2023-05-19 ENCOUNTER — OUTPATIENT (OUTPATIENT)
Dept: OUTPATIENT SERVICES | Facility: HOSPITAL | Age: 48
LOS: 1 days | Discharge: HOME | End: 2023-05-19
Payer: COMMERCIAL

## 2023-05-19 ENCOUNTER — RESULT REVIEW (OUTPATIENT)
Age: 48
End: 2023-05-19

## 2023-05-19 ENCOUNTER — APPOINTMENT (OUTPATIENT)
Dept: BREAST CENTER | Facility: AMBULATORY SURGERY CENTER | Age: 48
End: 2023-05-19

## 2023-05-19 VITALS
WEIGHT: 185.19 LBS | DIASTOLIC BLOOD PRESSURE: 81 MMHG | OXYGEN SATURATION: 100 % | RESPIRATION RATE: 14 BRPM | HEIGHT: 62 IN | TEMPERATURE: 98 F | HEART RATE: 84 BPM | SYSTOLIC BLOOD PRESSURE: 150 MMHG

## 2023-05-19 VITALS
HEART RATE: 82 BPM | RESPIRATION RATE: 16 BRPM | SYSTOLIC BLOOD PRESSURE: 145 MMHG | OXYGEN SATURATION: 100 % | DIASTOLIC BLOOD PRESSURE: 86 MMHG | TEMPERATURE: 98 F

## 2023-05-19 PROCEDURE — 19301 PARTIAL MASTECTOMY: CPT | Mod: LT

## 2023-05-19 PROCEDURE — 88307 TISSUE EXAM BY PATHOLOGIST: CPT | Mod: 26

## 2023-05-19 PROCEDURE — 14302 TIS TRNFR ADDL 30 SQ CM: CPT

## 2023-05-19 PROCEDURE — 14301 TIS TRNFR ANY 30.1-60 SQ CM: CPT

## 2023-05-19 PROCEDURE — 88305 TISSUE EXAM BY PATHOLOGIST: CPT | Mod: 26

## 2023-05-19 PROCEDURE — 88332 PATH CONSLTJ SURG EA ADD BLK: CPT | Mod: 26

## 2023-05-19 PROCEDURE — 76098 X-RAY EXAM SURGICAL SPECIMEN: CPT | Mod: 26

## 2023-05-19 PROCEDURE — 88331 PATH CONSLTJ SURG 1 BLK 1SPC: CPT | Mod: 26

## 2023-05-19 PROCEDURE — 38525 BIOPSY/REMOVAL LYMPH NODES: CPT | Mod: LT

## 2023-05-19 PROCEDURE — 19318 BREAST REDUCTION: CPT | Mod: RT

## 2023-05-19 PROCEDURE — 38900 IO MAP OF SENT LYMPH NODE: CPT | Mod: LT

## 2023-05-19 DEVICE — CLIP APPLIER ETHICON LIGACLIP 9 3/8" MEDIUM: Type: IMPLANTABLE DEVICE | Site: LEFT | Status: FUNCTIONAL

## 2023-05-19 DEVICE — CLIP APPLIER ETHICON LIGACLIP 9 3/8" SMALL: Type: IMPLANTABLE DEVICE | Site: LEFT | Status: FUNCTIONAL

## 2023-05-19 RX ORDER — APREPITANT 80 MG/1
40 CAPSULE ORAL ONCE
Refills: 0 | Status: COMPLETED | OUTPATIENT
Start: 2023-05-19 | End: 2023-05-19

## 2023-05-19 RX ORDER — OXYCODONE HYDROCHLORIDE 5 MG/1
5 TABLET ORAL ONCE
Refills: 0 | Status: DISCONTINUED | OUTPATIENT
Start: 2023-05-19 | End: 2023-05-19

## 2023-05-19 RX ORDER — ACETAMINOPHEN 500 MG
650 TABLET ORAL ONCE
Refills: 0 | Status: DISCONTINUED | OUTPATIENT
Start: 2023-05-19 | End: 2023-05-19

## 2023-05-19 RX ORDER — ONDANSETRON 8 MG/1
4 TABLET, FILM COATED ORAL ONCE
Refills: 0 | Status: COMPLETED | OUTPATIENT
Start: 2023-05-19 | End: 2023-05-19

## 2023-05-19 RX ORDER — AMLODIPINE BESYLATE 2.5 MG/1
1 TABLET ORAL
Refills: 0 | DISCHARGE

## 2023-05-19 RX ORDER — FENTANYL CITRATE 50 UG/ML
25 INJECTION INTRAVENOUS
Refills: 0 | Status: DISCONTINUED | OUTPATIENT
Start: 2023-05-19 | End: 2023-05-19

## 2023-05-19 RX ORDER — SODIUM CHLORIDE 9 MG/ML
1000 INJECTION, SOLUTION INTRAVENOUS
Refills: 0 | Status: DISCONTINUED | OUTPATIENT
Start: 2023-05-19 | End: 2023-05-19

## 2023-05-19 RX ORDER — ACETAMINOPHEN 500 MG
1000 TABLET ORAL ONCE
Refills: 0 | Status: COMPLETED | OUTPATIENT
Start: 2023-05-19 | End: 2023-05-19

## 2023-05-19 RX ADMIN — Medication 1000 MILLIGRAM(S): at 07:09

## 2023-05-19 RX ADMIN — ONDANSETRON 4 MILLIGRAM(S): 8 TABLET, FILM COATED ORAL at 13:51

## 2023-05-19 RX ADMIN — FENTANYL CITRATE 25 MICROGRAM(S): 50 INJECTION INTRAVENOUS at 12:14

## 2023-05-19 RX ADMIN — FENTANYL CITRATE 25 MICROGRAM(S): 50 INJECTION INTRAVENOUS at 11:54

## 2023-05-19 RX ADMIN — APREPITANT 40 MILLIGRAM(S): 80 CAPSULE ORAL at 07:10

## 2023-05-19 RX ADMIN — FENTANYL CITRATE 25 MICROGRAM(S): 50 INJECTION INTRAVENOUS at 12:35

## 2023-05-19 RX ADMIN — SODIUM CHLORIDE 100 MILLILITER(S): 9 INJECTION, SOLUTION INTRAVENOUS at 13:30

## 2023-05-19 RX ADMIN — SODIUM CHLORIDE 100 MILLILITER(S): 9 INJECTION, SOLUTION INTRAVENOUS at 13:53

## 2023-05-19 NOTE — PRE-ANESTHESIA EVALUATION ADULT - NSANTHINPATMEDSFT_GEN_ALL_CORE
Subjective:  Chief Complaint   Patient presents with    Other       HPI:  The patient presents to walk-in care with concerns over morning symptoms that she has been experiencing. The patient has not been seen by her PCP for several years. The patient relates to me a history of substance abuse, she states for about 1 month she took some kind of opioid. This was snorted. She tells me she actually did not know what was in it, until a friend of hers overdosed and then these were tested. She states she did have cravings with this, but after about 1 month of use she called her parents, and told them to come get her at college and bring her home and that she needed help. She states she went through withdrawal, and this was about 1 month ago. During the course of the month, she noticed that she would awaken in the morning with some tingling in the right upper extremity. This was concerning to her as she had a friend who was using and suffered a stroke. The patient states as the day goes on, her symptoms do improve. This past weekend, she visited her old friends, and she thinks they may have slipped something in her drink. She does not want to be drug tested today to determine if or what this was, but she states the tingling resolved the day that she potentially had this substance again, and then returned yesterday and this morning. The patient is very tearful during discussion. She is staying home with her parents now from college and hopes to return in the spring semester. She reports she has good support with friends who do not have any substance use history. The patient is currently seeing psychiatry, she was previously seeing counseling but has been out of touch with this, although intends to restart this. We had a very long discussion today. She does not currently have any sweating, diarrhea, mild myalgias. ROS:  Positive and pertinent negatives as per HPI.   All other systems are reviewed and negative. Current Outpatient Medications:     hydrOXYzine HCl (ATARAX) 10 MG tablet, TAKE ONE TABLET BY MOUTH TWICE DAILY AS NEEDED FOR ANXIETY, Disp: , Rfl:     VRAYLAR 1.5 MG capsule, TAKE ONE CAPSULE (1.5MG) BY MOUTH ONCE DAILY, Disp: , Rfl:     VYVANSE 40 MG CAPS, TAKE ONE CAPSULE BY MOUTH EVERY DAY in the morning, Disp: , Rfl:     tamsulosin (FLOMAX) 0.4 mg capsule, Take 1 capsule by mouth daily, Disp: 30 capsule, Rfl: 5    ARIPiprazole (ABILIFY) 5 MG tablet, TAKE 1 TABLET BY MOUTH EVERY DAY, Disp: , Rfl:     Levonorgestrel (KYLEENA) IUD 19.5 mg, 1 each by Intrauterine route once, Disp: , Rfl:    No Known Allergies     Objective:  Vitals:    10/17/22 1320   BP: 134/80   Pulse: (!) 115   Resp: 20   Temp: 99.7 °F (37.6 °C)   TempSrc: Temporal   SpO2: 100%   Weight: 137 lb (62.1 kg)   Height: 5' 1\" (1.549 m)        Exam:  Const: Appears healthy and well developed. No signs of acute distress present. Vitals reviewed per triage. Head/Face: Normocephalic, atraumatic. Facies is symmetric. Eyes: PERRL. ENMT: Tympanic membranes are pearly gray with good light reflex bilaterally. Nares are patent with clear rhinorrhea. Buccal mucosa is moist.  No erythema in the posterior pharynx without edema of oropharynx or petechiae of palate. Neck: Supple and symmetric. Palpation reveals no adenopathy. No meningeal signs. Trachea midline. Resp: Lungs are clear to auscultation bilaterally without wheezes, rhonchi, or crackles. Chest expansion was symmetrical without accessory muscle use noted. CV: S1 is normal. S2 is normal.  Musculo: Patient moves extremities without limitation. She does report some tenderness in the upper extremities to range of motion. Strength in the upper extremities is intact bilaterally. Pulses are equal bilaterally. Skin: Skin is warm and dry. Neuro: Alert and oriented x3. Speech is articulate and fluent. Cranial nerves II through X are grossly intact.   Psych: Patient is very Amlodipine increased to 5 mg last week

## 2023-05-19 NOTE — BRIEF OPERATIVE NOTE - OPERATION/FINDINGS
L breast s/p lumpectomy and SLNB with superior breast defect.  Oncoplastic closure of L breast with SM pedicle design to rotate NAC into defect.  Closure over 1JP.  R breast symmetrizing reduction.
Left breast was prepped and draped in usual fashion. Clip identification, blunt dissection used to mobilize the tissue and excise the targeted tissue. Electrocautery used to achieve hemostasis. Axilla dissected and SLN identified and excised. Hemostasis achieved. Rest of breast reconstruction completed by plastics

## 2023-05-19 NOTE — ASU DISCHARGE PLAN (ADULT/PEDIATRIC) - NS MD DC FALL RISK RISK
For information on Fall & Injury Prevention, visit: [email or web address] OR  [email or web address] OR  https://www.cdc.gov/steadi/patient.html

## 2023-05-19 NOTE — BRIEF OPERATIVE NOTE - NSICDXBRIEFPROCEDURE_GEN_ALL_CORE_FT
PROCEDURES:  Breast lumpectomy with sentinel node biopsy 19-May-2023 10:19:51  Katelynn Salmeron  
PROCEDURES:  Reconstruction, breast, using pattern reduction 19-May-2023 11:34:06  Jarek Vargas

## 2023-05-19 NOTE — BRIEF OPERATIVE NOTE - NSICDXBRIEFPREOP_GEN_ALL_CORE_FT
PRE-OP DIAGNOSIS:  Breast cancer 19-May-2023 10:20:00  Katelynn Salmeron  
PRE-OP DIAGNOSIS:  Breast cancer 19-May-2023 10:20:00  Katelynn Salmeron

## 2023-05-19 NOTE — ASU DISCHARGE PLAN (ADULT/PEDIATRIC) - CARE PROVIDER_API CALL
Gautam Leal)  Surgery  [address] [address]  Phone: [phone]  Fax: [phone]  Follow Up Time:    Gautam Leal)  Surgery  [address] [id number]  Phone: [phone]  Fax: [phone]  Follow Up Time:     Nehemiah Abbasi)  Plastic Surgery  [address] [id number]  Phone: [phone]  Fax: [phone]  Follow Up Time:

## 2023-05-19 NOTE — ASU DISCHARGE PLAN (ADULT/PEDIATRIC) - ASU DC SPECIAL INSTRUCTIONSFT
Please follow-up with Dr. Leal in 1-2 weeks. Call the office to schedule an appointment.  Please follow-up with Dr. Abbasi next week.     You may start showering tonight. Do not rub the incisions, pat them dry. Wear the bra for additional support.    LAM Drain Care:  *Please look at the site every day for signs of infection (increased redness or pain, swelling, odor, yellow or bloody discharge, warm to touch, fever).  *Maintain suction of the bulb.  *Note color, consistency, and amount of fluid in the drain. Call the doctor, nurse practitioner, or VNA nurse if the amount increases significantly or changes in character.  *Be sure to empty the drain frequently. Record the output, if instructed to do so.  *You may shower; wash the area gently with warm, soapy water.  *Keep the insertion site clean and dry otherwise.  *Avoid swimming, baths, hot tubs; do not submerge yourself in water.  *Make sure to keep the drain attached securely to your body to prevent pulling or dislocation. Please follow-up with Dr. Leal in 1-2 weeks. Call the office to schedule an appointment.  Please follow-up with Dr. Abbasi next week.   Medications have been sent to your pharmacy.  You may start showering tonight. Do not rub the incisions, pat them dry. Wear the bra for additional support.    LAM Drain Care:  *Please look at the site every day for signs of infection (increased redness or pain, swelling, odor, yellow or bloody discharge, warm to touch, fever).  *Maintain suction of the bulb.  *Note color, consistency, and amount of fluid in the drain. Call the doctor, nurse practitioner, or VNA nurse if the amount increases significantly or changes in character.  *Be sure to empty the drain frequently. Record the output, if instructed to do so.  *You may shower; wash the area gently with warm, soapy water.  *Keep the insertion site clean and dry otherwise.  *Avoid swimming, baths, hot tubs; do not submerge yourself in water.  *Make sure to keep the drain attached securely to your body to prevent pulling or dislocation.

## 2023-05-19 NOTE — PRE-ANESTHESIA EVALUATION ADULT - NSANTHAIRWAYFT_ENT_ALL_CORE
Patient's airway examined, Neck FROM, dentition intact. TMJ FROM. Good mouth opening  Midline trachea.  Adequate oral aperture/mento-hyoid distance/cervical range of motion.  CV RRR  CTAB Patient's airway examined, Neck limtied extension, dentition intact. TMJ FROM. Good mouth opening  large goiter on neck- patient able to lay flat without difficulty breathing.  Adequate oral aperture/mento-hyoid distance/somewhat limited extension  cervical range of motion.    airway positive for large goiter on neck , palpated a hard mass on hard palate, large tongue, somewhat limited extension, short/thick neck   CV RRR  CTAB

## 2023-05-19 NOTE — PRE-ANESTHESIA EVALUATION ADULT - NSANTHPMHFT_GEN_ALL_CORE
airway positive for large goiter on neck , palpated a hard mass on hard palate, large tongue, somewhat limited extension, short/thick neck

## 2023-05-19 NOTE — ASU DISCHARGE PLAN (ADULT/PEDIATRIC) - PROCEDURE
Left breast lumpectomy and sentinel lymph node biopsy and breast reconstruction Left breast lumpectomy and sentinel lymph node biopsy with left breast reconstruction and right breast symmetrizing reduction

## 2023-05-22 ENCOUNTER — APPOINTMENT (OUTPATIENT)
Dept: PLASTIC SURGERY | Facility: CLINIC | Age: 48
End: 2023-05-22
Payer: COMMERCIAL

## 2023-05-22 VITALS — OXYGEN SATURATION: 100 % | HEART RATE: 84 BPM

## 2023-05-22 VITALS — HEART RATE: 86 BPM | OXYGEN SATURATION: 100 % | WEIGHT: 172 LBS | BODY MASS INDEX: 31.65 KG/M2 | HEIGHT: 62 IN

## 2023-05-22 PROCEDURE — 99024 POSTOP FOLLOW-UP VISIT: CPT

## 2023-05-22 NOTE — HISTORY OF PRESENT ILLNESS
[FreeTextEntry1] : 48 y/o female presents 3 days s/p left breast local tissue rearrangement and contralateral reduction for symmetry. Denies any f/c/n/v, taking percocet PRN. Patient has 1 LAM drain -> ready for removal. She is seeing rad onc 6/1. \par \par Incisions c/d/i, no collections or s/sx of infection, LAM drain --> serosang fluid, removed, prineo in place\par Allow prineo to fall off, then aquaphor\par PO instructions reviewed\par RTC in 6 weeks\par

## 2023-05-22 NOTE — SURGICAL HISTORY
[de-identified] : 05/19/2023: left breast local tissue rearrangement and contralateral reduction for symmetry

## 2023-05-26 LAB — SURGICAL PATHOLOGY STUDY: SIGNIFICANT CHANGE UP

## 2023-05-31 ENCOUNTER — NON-APPOINTMENT (OUTPATIENT)
Age: 48
End: 2023-05-31

## 2023-06-01 ENCOUNTER — APPOINTMENT (OUTPATIENT)
Dept: HEMATOLOGY ONCOLOGY | Facility: CLINIC | Age: 48
End: 2023-06-01
Payer: COMMERCIAL

## 2023-06-01 ENCOUNTER — APPOINTMENT (OUTPATIENT)
Dept: BREAST CENTER | Facility: CLINIC | Age: 48
End: 2023-06-01

## 2023-06-01 VITALS
WEIGHT: 172 LBS | BODY MASS INDEX: 31.65 KG/M2 | SYSTOLIC BLOOD PRESSURE: 150 MMHG | DIASTOLIC BLOOD PRESSURE: 93 MMHG | HEART RATE: 66 BPM | OXYGEN SATURATION: 99 % | HEIGHT: 62 IN | TEMPERATURE: 97.3 F

## 2023-06-01 PROCEDURE — 99214 OFFICE O/P EST MOD 30 MIN: CPT

## 2023-06-01 NOTE — PHYSICAL EXAM
[No Supraclavicular Adenopathy] : no supraclavicular adenopathy [Examined in the supine and seated position] : examined in the supine and seated position [No dominant masses] : no dominant masses in right breast  [No Nipple Retraction] : no left nipple retraction [No Nipple Discharge] : no left nipple discharge [No Axillary Lymphadenopathy] : no right axillary lymphadenopathy [Symmetrical] : symmetrical [de-identified] : Port placed [de-identified] : Bilateral periareolar breast reconstruction incisions C/D/I [de-identified] : Transverse incision C/D/I

## 2023-06-01 NOTE — HISTORY OF PRESENT ILLNESS
[FreeTextEntry1] : 48 yo F presents for post-operative appointment s/p L double localized lumpectomy, targeted L axillary node excision, SLNB, and frozen section for LEFT IDC ER/[place] (-) HER-2 (+) with metastasis to lymph node seen on diagnostic mammogram as 0.6 cm irregular hypoechoic mass at LEFT 10n14 and 1.8 cm abnormal lymph node both biopsied on 12/9/22. Patient initially experienced R breast symptoms which she describes as similar to menstrual symptoms in November 2022 prompting diagnostic workup that detected L breast cancer. Of note, third biopsy also performed LEFT 2n4 showing radial scar with calcifications, also excised.\par \par Final surgical pathology revealed no invasive carcinoma. DCIS present within the tumor bed area. Prior procedure site changes x 2. Margins negative. (0/7) lymph nodes negative for carcinoma. Denies any fever, chills, redness, pain, or discharge at the incision site.\par \par Patient underwent segmental excision with breast conservation and reconstruction with breast tissue rearrangement and contralateral breast reduction for symmetry Dr. Abbasi. Patient underwent chemotherapy/immunotherapy with Dr. Chavis, currently receiving immunotherapy. B/L MRI performed 4/12/23 showed near complete imaging response to therapy. \par \par Of note, left posterior cervical lymphadenopathy noted on physical exam during first office visit, FNA performed Feb 2023 negative for malignant cells.\par \par 3 clips noted in the left breast and one in the left axilla (4 total clips).\par 1. Biopsy proven metastasis to a left axillary lymph node (cork clip)\par 2. Biopsy proven left IDC 10:00 (14N)(buckle clip)\par 3. Biopsy proven radial scar left 2:00 (4N)(top hat)\par 4. Biopsy left 12:00(15N)(ribbon) = IDC\par \par Denies family history of breast or ovarian CA. [facility] 47 gene panel negative.

## 2023-06-01 NOTE — PHYSICAL EXAM
[Normal] : affect appropriate [de-identified] : s/p left breast lumpectomy and SLNBs; s/p right reduction mastopexy; surgical scars healing well

## 2023-06-01 NOTE — ASSESSMENT
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 Final surgical pathology revealed no invasive carcinoma. DCIS present within the tumor bed area. Prior procedure site changes x 2. Margins negative. (0/7) lymph nodes negative for carcinoma. Discussed patient's pathology in detail and answered any questions. Patient will continue with medical oncology and plastic surgery. She will see radiation oncology within 1-2 weeks and RTC in 3 months for reexamination and repeat sozo measurement. Patient verbalized understanding and agreement of plan.\par

## 2023-06-01 NOTE — DATA REVIEWED
[FreeTextEntry1] : 12/2/22 B/L dxMMG/US (R): The left breast 0.6 x 0.5 x 0.5 cm indistinct irregular hypoechoic mass at 10:00 14 CMFN corresponds with mammography and is suspicious for malignancy. Left breast ultrasound-guided core biopsy with microclip placement and postprocedural mammographic correlation is recommended. The 0.9 x 0.8 x 0.7 cm irregular complex cystic mass at 2:00 4 CMFN is indeterminate. Left breast ultrasound-guided core biopsy with microclip placement and postprocedural mammographic correlation is recommended. The 1.8 x 1.2 cm abnormally thickened lymph node at 2:00 16 CMFN is suspicious for malignancy. Left breast ultrasound-guided core biopsy with microclip placement and postprocedural mammographic correlation is recommended. No mammographic or sonographic evidence of malignancy in the right breast. BIRADS 5.\par \par 12/27/22 ([facility]) 47 HBOC gene panel: negative for pathogenic mutations. \par \par 12/30/22 ([facility]) B/L MRI: \par 1. Left breast cancer 10:00 14cmfn (buckle clip) , newly diagnosed cancer. Measures about 0.6 cm.\par 2. Left breast, 12:00 15cmfn (near chest wall), there is an irregular 3.2 x 1.2 x 1.4 cm mass corresponding to the area of distortion seen on the recent mammogram. There is no clip in this mass. Recommend second look ultrasound and biopsy. If not seen on ultrasound, recommend stereotactic biopsy. \par 3. Left 2:00 position 4cmfn (top-hat shaped clip) there is an irregular 0.5 cm mass corresponding to the biopsy showing radial scar.\par 4. Left axillary adenopathy. Corresponds to biopsy (cork clip) showing metastatic cancer.\par RECOMMENDATION: Ultrasound biopsy. BI-RADS 4C- Suspicious Finding(s) - High Suspicion for Malignancy \par \par 1/10/23 L Targeted US ([facility]): LEFT breast 12:00 14 cm from the nipple, there is an irregular 33 x 7 x 15 mm mass corresponding to the MRI finding. This was targeted for subsequent ultrasound biopsy performed today. BIRADS 4.\par \par 1/10/23 L US Guided Biopsy ([facility]): L 12:00 14cmFN pathology shows Invasive ductal carcinoma, poorly-differentiated, DCIS. Concordant malignant.\par \par 2/2/23 L Posterior Cervical LN FNA ([facility]): NEGATIVE FOR MALIGNANT CELLS. Heterogeneous population of lymphocytes. No epithelial cells visualized. Cell block, noncontributory. Note: Flow cytometry will be reported in an addendum. \par \par 4/17/23 B/L MRI ([facility]): LEFT BREAST:\par 3 clips noted in the left breast and one in the left axilla (4 total clips).\par 1. Biopsy proven metastasis to a left axillary lymph node (cork clip)\par 2. Biopsy proven left IDC 10:00 (14N)(buckle clip)\par 3. Biopsy proven radial scar left 2:00 (4N)(top hat)\par 4. Biopsy left 12:00(15N)(ribbon) = IDC\par \par Previously noted masses are markedly smaller, with slight residual areas of enhancement around the clips.  These may represent post-chemo effect, clip artifact or some residual disease.\par \par 5/19/23 L Lumpectomy and Targeted LN/SLNB Pathology: No invasive carcinoma is seen. 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 DCIS is 5 mm form the posterior margin. Prior procedure site changes (x2). Margins negative. (0/7) LN negative.\par

## 2023-06-01 NOTE — HISTORY OF PRESENT ILLNESS
[de-identified] : 48 y/o female with h/o left breast cancer s/p neoadjuvant TCHP, 5/19/23 left lumpectomy/SLNB, local tissue rearrangement and contralateral reduction.  She returns for f/u.\par \par PMHx:  HTN, multinodular goiter, followed by ENT, had 5 biopsies that were benign, last thyroid US last year.  Denies any compressive symptoms, such as dysphagia or dyspnea.  \par \par Patient initially experienced R breast heaviness which she describes as similar to menstrual symptoms in November 2022 prompting diagnostic workup that detected a left breast cancer, invasive ductal carcinoma, ER/[place] (-) HER-2 (+) with metastasis to lymph node. Left breast and axillary lymph node were both biopsied on 12/9/22.\par  \par Denies family history of breast or ovarian CA. Denies any history of breast surgeries or biopsies.\par \par 12/2/22 B/L dxMMG/US ([facility]): The left breast 0.6 x 0.5 x 0.5 cm indistinct irregular hypoechoic mass at 10:00 14 CMFN corresponds with mammography and is suspicious for malignancy. The 0.9 x 0.8 x 0.7 cm irregular complex cystic mass at 2:00 4 CMFN is indeterminate. The 1.8 x 1.2 cm abnormally thickened lymph node at 2:00 16 CMFN is suspicious for malignancy. No mammographic or sonographic evidence of malignancy in the right breast. BIRADS 5.\par \par 12/9/22 biopsy x 3:\par A. Left breast 2:00 4cmFN core biopsy: radial scar with calcifications, columnar cell change, cystic apocrine metaplasia.\par B. Left breast 2:00 16cm FN core biopsy: cortically thickened lymph node in the left axilla, metastatic carcinoma morphologically similar to part C.\par C. Left breast 10:00 14cm FN core biopsy: invasive moderately to poorly differentiated ductal carcinoma with micropapillary features, spanning at least 7 mm in this material.  Adjacent benign breast tissue with fibrocystic changes.  ER-, [place]-, HER2+ \par \par 12/30/22 MRI: \par 1. Left breast cancer 10:00 (14N) (buckle clip), newly diagnosed cancer.  Measures about 6 mm.\par 2. Left breast, 12:00 (15N) (near chest wall), there is an irregular 32 x 12 x 14 mm mass corresponding to the area of distortion seen on the recent mammogram.  There is no clip in this mass.  Recommend second look ultrasound and biopsy.  If not seen on ultrasound, recommend stereotactic biopsy.\par 3. Left 2:00 position, 4cm from  nipple (top hat shaped clip) there is an irregular 5 mm mass corresponding to the biopsy showing radial scar.\par 4. Left axillary adenopathy.  Corresponds to biopsy (cork clip) showing metastatic cancer.\par \par 1/6/23 PETCT: \par 1. Multiple hypermetabolic left breast lesions and hypermetabolic left axillary lymphadenopathy, which is consistent with breast malignancy with local colton spread.  No evidence of distant FDG avid metastatic disease.\par 2. Diffuse left-sided asymmetric enlargement of the thyroid gland with heterogeneous attenuation, typical of multinodular goiter.  There is also slight rightward tracheal deviation due to mass effect without tracheal narrowing.\par \par 1/10/23 ECHO: LVEF 65-70%\par \par 1/10/23 L breast US: Left breast 12:00 14 cm from the nipple, there is an irregular 33 x 7 x 15 mm mass corresponding to MRI finding.\par US guided L breast core biopsy: Invasive ductal carcinoma, poorly-differentiated, DCIS. Concordant malignant.\par \par 2/2/23 port placement.\par \par 4/17/23 MRI breast: near complete imaging response to neoadjuvant chemotherapy.  Previously noted masses are markedly smaller with slight residual areas of enhancement around the clips.  These may represent post-chemo effect, clip artifact or some residual disease.  There is no internal mammary adenopathy.  Previously enlarged left axillary lymph nodes are smaller than noted on 12/30/22 MRI.  The largest lymph node now measures about 1.6 cm (previously 3 cm) and does not contain a clip.  The previously biopsied lymph node is no longer seen.\par \par 4/18/23 ECHO\par 1. Normal left ventricular size and systolic function\par 2. Left ventricular global longitudinal strain was -20.90%\par 3. Normal right ventricular size and systolic function\par 4. Mild tricuspid regurgitation\par 5. No evidence of pulmonary hypertension, pulmonary artery systolic pressure is 29 mmHg\par 6. No pericardial effusion\par 7. Compared to the previous TTE performed on 1/10/23, there have been no changes\par \par 5/19/23 left lumpectomy/SLNB: No invasive carcinoma seen.  DCIS present within the tumor bed area. 0/7 LNs.  ypTis ypN0 [de-identified] : moderately to poorly invasive ductal carcinoma [de-identified] : ER-, [place]- HER2+  [de-identified] : 12/27/22 ([facility]) 47 HBOC gene panel: negative for pathogenic mutations [FreeTextEntry1] : Weekly paclitaxel x 12 (1/23/23-4/11/23)\par Weekly carboplatin x 6 (1/23/23- 2/28/23); discontinued due to neutropenia.\par Kanjinti/pertuzumab every 3 weeks (1/23/23-5/9/23) Pt was unable to state the PT goals

## 2023-06-05 RX ORDER — PERTUZUMAB 30 MG/ML
420 INJECTION, SOLUTION, CONCENTRATE INTRAVENOUS ONCE
Refills: 0 | Status: COMPLETED | OUTPATIENT
Start: 2023-11-21 | End: 2023-11-21

## 2023-06-05 RX ORDER — PERTUZUMAB 30 MG/ML
420 INJECTION, SOLUTION, CONCENTRATE INTRAVENOUS ONCE
Refills: 0 | Status: COMPLETED | OUTPATIENT
Start: 2023-06-06 | End: 2023-06-06

## 2023-06-05 RX ORDER — TRASTUZUMAB-DKST 420 MG/20ML
468 INJECTION, POWDER, LYOPHILIZED, FOR SOLUTION INTRAVENOUS ONCE
Refills: 0 | Status: COMPLETED | OUTPATIENT
Start: 2023-11-21 | End: 2023-11-21

## 2023-06-05 RX ORDER — TRASTUZUMAB-DKST 420 MG/20ML
468 INJECTION, POWDER, LYOPHILIZED, FOR SOLUTION INTRAVENOUS ONCE
Refills: 0 | Status: COMPLETED | OUTPATIENT
Start: 2023-06-06 | End: 2023-06-06

## 2023-06-06 ENCOUNTER — APPOINTMENT (OUTPATIENT)
Dept: INFUSION THERAPY | Facility: CLINIC | Age: 48
End: 2023-06-06

## 2023-06-06 ENCOUNTER — OUTPATIENT (OUTPATIENT)
Dept: OUTPATIENT SERVICES | Facility: HOSPITAL | Age: 48
LOS: 1 days | End: 2023-06-06
Payer: COMMERCIAL

## 2023-06-06 ENCOUNTER — APPOINTMENT (OUTPATIENT)
Dept: RADIATION ONCOLOGY | Facility: CLINIC | Age: 48
End: 2023-06-06
Payer: COMMERCIAL

## 2023-06-06 VITALS
HEIGHT: 62 IN | RESPIRATION RATE: 16 BRPM | WEIGHT: 173.06 LBS | SYSTOLIC BLOOD PRESSURE: 142 MMHG | OXYGEN SATURATION: 99 % | TEMPERATURE: 98 F | HEART RATE: 78 BPM | DIASTOLIC BLOOD PRESSURE: 84 MMHG

## 2023-06-06 VITALS
DIASTOLIC BLOOD PRESSURE: 88 MMHG | HEART RATE: 79 BPM | TEMPERATURE: 98 F | SYSTOLIC BLOOD PRESSURE: 124 MMHG | OXYGEN SATURATION: 98 % | RESPIRATION RATE: 16 BRPM

## 2023-06-06 VITALS
SYSTOLIC BLOOD PRESSURE: 135 MMHG | DIASTOLIC BLOOD PRESSURE: 87 MMHG | TEMPERATURE: 97.8 F | OXYGEN SATURATION: 99 % | HEIGHT: 62 IN | RESPIRATION RATE: 16 BRPM | WEIGHT: 169 LBS | HEART RATE: 70 BPM | BODY MASS INDEX: 31.1 KG/M2

## 2023-06-06 DIAGNOSIS — C50.912 MALIGNANT NEOPLASM OF UNSPECIFIED SITE OF LEFT FEMALE BREAST: ICD-10-CM

## 2023-06-06 PROCEDURE — 96413 CHEMO IV INFUSION 1 HR: CPT

## 2023-06-06 PROCEDURE — 99205 OFFICE O/P NEW HI 60 MIN: CPT

## 2023-06-06 PROCEDURE — 96417 CHEMO IV INFUS EACH ADDL SEQ: CPT

## 2023-06-06 RX ADMIN — TRASTUZUMAB-DKST 468 MILLIGRAM(S): 420 INJECTION, POWDER, LYOPHILIZED, FOR SOLUTION INTRAVENOUS at 14:58

## 2023-06-06 RX ADMIN — TRASTUZUMAB-DKST 468 MILLIGRAM(S): 420 INJECTION, POWDER, LYOPHILIZED, FOR SOLUTION INTRAVENOUS at 15:30

## 2023-06-06 RX ADMIN — PERTUZUMAB 420 MILLIGRAM(S): 30 INJECTION, SOLUTION, CONCENTRATE INTRAVENOUS at 16:01

## 2023-06-06 RX ADMIN — Medication 300 UNIT(S): at 16:02

## 2023-06-06 RX ADMIN — PERTUZUMAB 420 MILLIGRAM(S): 30 INJECTION, SOLUTION, CONCENTRATE INTRAVENOUS at 15:31

## 2023-06-11 NOTE — HISTORY OF PRESENT ILLNESS
[FreeTextEntry1] : 46 y/o F PMH multinodular goiter and now LEFT ER-/MT-/HER2+ clinical prognostic stage IIB ([id number]; [id number]) IDC s/p NA TCHP and BCS w/ SLNBx (no residual invasive disease, G2-3, DCIS+, margin-, 0/7 LNs), with total of 1 year of HP planned adjuvantly.  presents per Dr. Leal to discuss radiation therapy.  \par \par 6/6/2023: Consultation\par Reports feeling well overall. Notes a tugging sensation at post-op breast with overuse of the upper extremities. Of note, patient works in [employer] and commutes from [facility] in [place], [place]. \par \par \par History of Present Illness:\par ________________________________\par \par 11/2022 Patient initially experienced R breast heaviness which she describes as similar to menstrual symptoms in November 2022 prompting diagnostic workup that detected a left breast cancer, invasive ductal carcinoma, ER/MT (-) HER-2 (+) with metastasis to lymph node. Left breast and axillary lymph node were both biopsied on 12/9/22.\par \par 12/2/22 B/L dxMMG/US ([facility]): The left breast 0.6 x 0.5 x 0.5 cm indistinct irregular hypoechoic mass at 10:00 14 CMFN corresponds with mammography and is suspicious for malignancy. Left breast ultrasound-guided core biopsy with microclip placement and postprocedural mammographic correlation is recommended. The 0.9 x 0.8 x 0.7 cm irregular complex cystic mass at 2:00 4 CMFN is indeterminate. Left breast ultrasound-guided core biopsy with microclip placement and postprocedural mammographic correlation is recommended. The 1.8 x 1.2 cm abnormally thickened lymph node at 2:00 16 CMFN is suspicious for malignancy. Left breast ultrasound-guided core biopsy with microclip placement and postprocedural mammographic correlation is recommended. No mammographic or sonographic evidence of malignancy in the right breast. BIRADS 5.\par \par 12/27/22 ([facility]) 47 HBOC gene panel: negative for pathogenic mutations. \par \par 12/30/22 ([facility]) B/L MRI: \par 1. Left breast cancer 10:00 14cmfn (buckle clip) , newly diagnosed cancer. Measures about 0.6 cm.\par 2. Left breast, 12:00 15cmfn (near chest wall), there is an irregular 3.2 x 1.2 x 1.4 cm mass corresponding to the area of distortion seen on the recent mammogram. There is no clip in this mass. Recommend second look ultrasound and biopsy. If not seen on ultrasound, recommend stereotactic biopsy. \par 3. Left 2:00 position 4cmfn (top-hat shaped clip) there is an irregular 0.5 cm mass corresponding to the biopsy showing radial scar.\par 4. Left axillary adenopathy. Corresponds to biopsy (cork clip) showing metastatic cancer.\par RECOMMENDATION: Ultrasound biopsy. BI-RADS 4C- Suspicious Finding(s) - High Suspicion for Malignancy \par \par 1/6/23 PETCT: \par 1. Multiple hypermetabolic left breast lesions and hypermetabolic left axillary lymphadenopathy, which is consistent with breast malignancy with local colton spread. No evidence of distant FDG avid metastatic disease.\par 2. Diffuse left-sided asymmetric enlargement of the thyroid gland with heterogeneous attenuation, typical of multinodular goiter. There is also slight rightward tracheal deviation due to mass effect without tracheal narrowing.\par \par 1/10/23 L Targeted US ([facility]): LEFT breast 12:00 14 cm from the nipple, there is an irregular 33 x 7 x 15 mm mass corresponding to the MRI finding. This was targeted for subsequent ultrasound biopsy performed today. BIRADS 4.\par \par 1/10/23 L US Guided Biopsy ([facility]): L 12:00 14cmFN pathology shows Invasive ductal carcinoma, poorly-differentiated, DCIS. Concordant malignant.\par \par 2/2/23 L Posterior Cervical LN FNA ([facility]): NEGATIVE FOR MALIGNANT CELLS. Heterogeneous population of lymphocytes. No epithelial cells visualized. Cell block, noncontributory. Note: Flow cytometry will be reported in an addendum. \par \par 4/17/23 B/L MRI ([facility]): LEFT BREAST:\par 3 clips noted in the left breast and one in the left axilla (4 total clips).\par 1. Biopsy proven metastasis to a left axillary lymph node (cork clip)\par 2. Biopsy proven left IDC 10:00 (14N)(buckle clip)\par 3. Biopsy proven radial scar left 2:00 (4N)(top hat)\par 4. Biopsy left 12:00(15N)(ribbon) = IDC\par \par Previously noted masses are markedly smaller, with slight residual areas of enhancement around the clips. These may represent post-chemo effect, clip artifact or some residual disease.\par \par 5/19/23 L Lumpectomy and Targeted LN/SLNB Pathology: No invasive carcinoma is seen. 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 DCIS is 5 mm form the posterior margin. Prior procedure site changes (x2). Margins negative. (0/7) LN negative.\par \par Systemic therapy:\par Paclitaxel x 12 (1/23/23-4/11/23)\par Weekly carboplatin x 6 (1/23/23- 2/28/23); discontinued due to neutropenia.\par Kanjinti/pertuzumab every 3 weeks (1/23/23-5/9/23)\par Trastuzumab and Pertuzumab to (planned for 1 year), starting (6/6/23 - presents)\par

## 2023-06-11 NOTE — PHYSICAL EXAM
[Hearing Threshold Finger Rub Not [place]] : hearing was normal [] : no respiratory distress [Exaggerated Use Of Accessory Muscles For Inspiration] : no accessory muscle use [Nondistended] : nondistended [Supraclavicular Lymph Nodes Enlarged Bilaterally] : supraclavicular [Axillary Lymph Nodes Enlarged Bilaterally] : axillary [de-identified] : Well healed L breast BCS and SLNBx site, no abnormal masses palpated. Well healed R breast reduction site.

## 2023-06-11 NOTE — VITALS
[Pain Duration: ___] : Pain duration: [unfilled] [Pain Location: ___] : Pain Location: [unfilled] [NoTreatment Scheduled] : no treatment scheduled [Date: ____________] : Patient's last distress assessment performed on [unfilled]. [Maximal Pain Intensity: 3/10] : 3/10 [Least Pain Intensity: 0/10] : 0/10 [Pain Interferes with ADLs] : Pain does not interfere with activities of daily living [90: Able to carry normal activity; minor signs or symptoms of disease.] : 90: Able to carry normal activity; minor signs or symptoms of disease.  [ECOG Performance Status: 1 - Restricted in physically strenuous activity but ambulatory and able to carry out work of a light or sedentary nature] : Performance Status: 1 - Restricted in physically strenuous activity but ambulatory and able to carry out work of a light or sedentary nature, e.g., light house work, office work [3 - Distress Level] : Distress Level: 3

## 2023-06-27 ENCOUNTER — APPOINTMENT (OUTPATIENT)
Dept: INFUSION THERAPY | Facility: CLINIC | Age: 48
End: 2023-06-27

## 2023-06-27 ENCOUNTER — OUTPATIENT (OUTPATIENT)
Dept: OUTPATIENT SERVICES | Facility: HOSPITAL | Age: 48
LOS: 1 days | End: 2023-06-27
Payer: COMMERCIAL

## 2023-06-27 VITALS
HEART RATE: 478 BPM | RESPIRATION RATE: 18 BRPM | SYSTOLIC BLOOD PRESSURE: 114 MMHG | OXYGEN SATURATION: 99 % | TEMPERATURE: 98 F | DIASTOLIC BLOOD PRESSURE: 74 MMHG

## 2023-06-27 VITALS
TEMPERATURE: 98 F | HEART RATE: 78 BPM | HEIGHT: 62 IN | WEIGHT: 173.06 LBS | OXYGEN SATURATION: 99 % | DIASTOLIC BLOOD PRESSURE: 74 MMHG | RESPIRATION RATE: 18 BRPM | SYSTOLIC BLOOD PRESSURE: 112 MMHG

## 2023-06-27 DIAGNOSIS — C50.912 MALIGNANT NEOPLASM OF UNSPECIFIED SITE OF LEFT FEMALE BREAST: ICD-10-CM

## 2023-06-27 PROCEDURE — 96417 CHEMO IV INFUS EACH ADDL SEQ: CPT

## 2023-06-27 PROCEDURE — 96413 CHEMO IV INFUSION 1 HR: CPT

## 2023-06-27 RX ORDER — PERTUZUMAB 30 MG/ML
420 INJECTION, SOLUTION, CONCENTRATE INTRAVENOUS ONCE
Refills: 0 | Status: COMPLETED | OUTPATIENT
Start: 2023-06-27 | End: 2023-06-27

## 2023-06-27 RX ORDER — TRASTUZUMAB-DKST 420 MG/20ML
468 INJECTION, POWDER, LYOPHILIZED, FOR SOLUTION INTRAVENOUS ONCE
Refills: 0 | Status: COMPLETED | OUTPATIENT
Start: 2023-06-27 | End: 2023-06-27

## 2023-06-27 RX ADMIN — TRASTUZUMAB-DKST 468 MILLIGRAM(S): 420 INJECTION, POWDER, LYOPHILIZED, FOR SOLUTION INTRAVENOUS at 11:33

## 2023-06-27 RX ADMIN — PERTUZUMAB 420 MILLIGRAM(S): 30 INJECTION, SOLUTION, CONCENTRATE INTRAVENOUS at 12:40

## 2023-06-27 RX ADMIN — TRASTUZUMAB-DKST 468 MILLIGRAM(S): 420 INJECTION, POWDER, LYOPHILIZED, FOR SOLUTION INTRAVENOUS at 12:10

## 2023-06-27 RX ADMIN — Medication 300 UNIT(S): at 12:41

## 2023-06-27 RX ADMIN — PERTUZUMAB 420 MILLIGRAM(S): 30 INJECTION, SOLUTION, CONCENTRATE INTRAVENOUS at 12:10

## 2023-07-10 ENCOUNTER — APPOINTMENT (OUTPATIENT)
Dept: PLASTIC SURGERY | Facility: CLINIC | Age: 48
End: 2023-07-10
Payer: COMMERCIAL

## 2023-07-17 ENCOUNTER — APPOINTMENT (OUTPATIENT)
Dept: PLASTIC SURGERY | Facility: CLINIC | Age: 48
End: 2023-07-17
Payer: COMMERCIAL

## 2023-07-17 ENCOUNTER — FORM ENCOUNTER (OUTPATIENT)
Age: 48
End: 2023-07-17

## 2023-07-17 PROCEDURE — 99213 OFFICE O/P EST LOW 20 MIN: CPT | Mod: 24

## 2023-07-17 PROCEDURE — 99024 POSTOP FOLLOW-UP VISIT: CPT

## 2023-07-17 NOTE — HISTORY OF PRESENT ILLNESS
[FreeTextEntry1] : 48 y/o female presents 8 weeks s/p left breast local tissue rearrangement and contralateral reduction for symmetry on 05/19/2023. Denies any f/c/n/v. She will need radiation. She would also like to discuss abdominoplasty.\par \par Breast: Incisions healing well, no collections or s/sx of infection, good symmetry\par Abdomen: NT, ND, no masses, no scars, adequate tissue for autologous donor site, +skin laxity, +adipose tissue\par \par Discussed potential need for abdominal donor site for reconstruction in the future, advised against abdominoplasty at this time.\par Aquaphor\par RTC in 1 year\par

## 2023-07-17 NOTE — SURGICAL HISTORY
[de-identified] : 05/19/2023: left breast local tissue rearrangement and contralateral reduction for symmetry

## 2023-07-18 ENCOUNTER — OUTPATIENT (OUTPATIENT)
Dept: OUTPATIENT SERVICES | Facility: HOSPITAL | Age: 48
LOS: 1 days | End: 2023-07-18
Payer: COMMERCIAL

## 2023-07-18 ENCOUNTER — APPOINTMENT (OUTPATIENT)
Dept: INFUSION THERAPY | Facility: CLINIC | Age: 48
End: 2023-07-18

## 2023-07-18 ENCOUNTER — APPOINTMENT (OUTPATIENT)
Dept: HEMATOLOGY ONCOLOGY | Facility: CLINIC | Age: 48
End: 2023-07-18
Payer: COMMERCIAL

## 2023-07-18 VITALS
OXYGEN SATURATION: 99 % | DIASTOLIC BLOOD PRESSURE: 81 MMHG | HEART RATE: 73 BPM | TEMPERATURE: 98 F | RESPIRATION RATE: 18 BRPM | SYSTOLIC BLOOD PRESSURE: 133 MMHG

## 2023-07-18 VITALS
WEIGHT: 169.09 LBS | HEIGHT: 62 IN | DIASTOLIC BLOOD PRESSURE: 89 MMHG | HEART RATE: 79 BPM | TEMPERATURE: 97 F | OXYGEN SATURATION: 99 % | SYSTOLIC BLOOD PRESSURE: 157 MMHG | RESPIRATION RATE: 18 BRPM

## 2023-07-18 VITALS
RESPIRATION RATE: 18 BRPM | HEIGHT: 62 IN | OXYGEN SATURATION: 99 % | TEMPERATURE: 97.3 F | HEART RATE: 79 BPM | WEIGHT: 169 LBS | BODY MASS INDEX: 31.1 KG/M2 | DIASTOLIC BLOOD PRESSURE: 89 MMHG | SYSTOLIC BLOOD PRESSURE: 157 MMHG

## 2023-07-18 DIAGNOSIS — C50.912 MALIGNANT NEOPLASM OF UNSPECIFIED SITE OF LEFT FEMALE BREAST: ICD-10-CM

## 2023-07-18 PROCEDURE — 93306 TTE W/DOPPLER COMPLETE: CPT

## 2023-07-18 PROCEDURE — 93306 TTE W/DOPPLER COMPLETE: CPT | Mod: 26

## 2023-07-18 PROCEDURE — 99214 OFFICE O/P EST MOD 30 MIN: CPT

## 2023-07-18 PROCEDURE — 96417 CHEMO IV INFUS EACH ADDL SEQ: CPT

## 2023-07-18 PROCEDURE — 96413 CHEMO IV INFUSION 1 HR: CPT

## 2023-07-18 RX ORDER — OXYCODONE AND ACETAMINOPHEN 5; 325 MG/1; MG/1
5-325 TABLET ORAL
Qty: 20 | Refills: 0 | Status: DISCONTINUED | COMMUNITY
Start: 2023-05-10 | End: 2023-07-18

## 2023-07-18 RX ORDER — PERTUZUMAB 30 MG/ML
420 INJECTION, SOLUTION, CONCENTRATE INTRAVENOUS ONCE
Refills: 0 | Status: COMPLETED | OUTPATIENT
Start: 2023-07-18 | End: 2023-07-18

## 2023-07-18 RX ORDER — TRASTUZUMAB-DKST 420 MG/20ML
468 INJECTION, POWDER, LYOPHILIZED, FOR SOLUTION INTRAVENOUS ONCE
Refills: 0 | Status: COMPLETED | OUTPATIENT
Start: 2023-07-18 | End: 2023-07-18

## 2023-07-18 RX ADMIN — TRASTUZUMAB-DKST 468 MILLIGRAM(S): 420 INJECTION, POWDER, LYOPHILIZED, FOR SOLUTION INTRAVENOUS at 13:15

## 2023-07-18 RX ADMIN — TRASTUZUMAB-DKST 468 MILLIGRAM(S): 420 INJECTION, POWDER, LYOPHILIZED, FOR SOLUTION INTRAVENOUS at 13:45

## 2023-07-18 RX ADMIN — PERTUZUMAB 420 MILLIGRAM(S): 30 INJECTION, SOLUTION, CONCENTRATE INTRAVENOUS at 13:10

## 2023-07-18 RX ADMIN — PERTUZUMAB 420 MILLIGRAM(S): 30 INJECTION, SOLUTION, CONCENTRATE INTRAVENOUS at 12:40

## 2023-07-18 RX ADMIN — Medication 300 UNIT(S): at 13:47

## 2023-07-18 NOTE — HISTORY OF PRESENT ILLNESS
[Disease: _____________________] : Disease: [unfilled] [T: ___] : T[unfilled] [N: ___] : N[unfilled] [M: ___] : M[unfilled] [AJCC Stage: ____] : AJCC Stage: [unfilled] [Treatment Protocol] : Treatment Protocol [de-identified] : 48 y/o female with h/o left breast cancer s/p neoadjuvant TCHP, 5/19/23 left lumpectomy/SLNB, local tissue rearrangement and contralateral reduction. She is on adjuvant Kanjinti/pertuzumab.  She returns for f/u and tx. \par \par PMHx:  HTN, multinodular goiter, followed by ENT, had 5 biopsies that were benign, last thyroid US last year.  Denies any compressive symptoms, such as dysphagia or dyspnea.  \par \par Patient initially experienced R breast heaviness which she describes as similar to menstrual symptoms in November 2022 prompting diagnostic workup that detected a left breast cancer, invasive ductal carcinoma, ER/TN (-) HER-2 (+) with metastasis to lymph node. Left breast and axillary lymph node were both biopsied on 12/9/22.\par  \par Denies family history of breast or ovarian CA. Denies any history of breast surgeries or biopsies.\par \par 12/2/22 B/L dxMMG/US ([facility]): The left breast 0.6 x 0.5 x 0.5 cm indistinct irregular hypoechoic mass at 10:00 14 CMFN corresponds with mammography and is suspicious for malignancy. The 0.9 x 0.8 x 0.7 cm irregular complex cystic mass at 2:00 4 CMFN is indeterminate. The 1.8 x 1.2 cm abnormally thickened lymph node at 2:00 16 CMFN is suspicious for malignancy. No mammographic or sonographic evidence of malignancy in the right breast. BIRADS 5.\par \par 12/9/22 biopsy x 3:\par A. Left breast 2:00 4cmFN core biopsy: radial scar with calcifications, columnar cell change, cystic apocrine metaplasia.\par B. Left breast 2:00 16cm FN core biopsy: cortically thickened lymph node in the left axilla, metastatic carcinoma morphologically similar to part C.\par C. Left breast 10:00 14cm FN core biopsy: invasive moderately to poorly differentiated ductal carcinoma with micropapillary features, spanning at least 7 mm in this material.  Adjacent benign breast tissue with fibrocystic changes.  ER-, [place]-, HER2+ \par \par 12/30/22 MRI: \par 1. Left breast cancer 10:00 (14N) (buckle clip), newly diagnosed cancer.  Measures about 6 mm.\par 2. Left breast, 12:00 (15N) (near chest wall), there is an irregular 32 x 12 x 14 mm mass corresponding to the area of distortion seen on the recent mammogram.  There is no clip in this mass.  Recommend second look ultrasound and biopsy.  If not seen on ultrasound, recommend stereotactic biopsy.\par 3. Left 2:00 position, 4cm from  nipple (top hat shaped clip) there is an irregular 5 mm mass corresponding to the biopsy showing radial scar.\par 4. Left axillary adenopathy.  Corresponds to biopsy (cork clip) showing metastatic cancer.\par \par 1/6/23 PETCT: \par 1. Multiple hypermetabolic left breast lesions and hypermetabolic left axillary lymphadenopathy, which is consistent with breast malignancy with local colton spread.  No evidence of distant FDG avid metastatic disease.\par 2. Diffuse left-sided asymmetric enlargement of the thyroid gland with heterogeneous attenuation, typical of multinodular goiter.  There is also slight rightward tracheal deviation due to mass effect without tracheal narrowing.\par \par 1/10/23 ECHO: LVEF 65-70%\par \par 1/10/23 L breast US: Left breast 12:00 14 cm from the nipple, there is an irregular 33 x 7 x 15 mm mass corresponding to MRI finding.\par US guided L breast core biopsy: Invasive ductal carcinoma, poorly-differentiated, DCIS. Concordant malignant.\par \par 2/2/23 port placement.\par \par 4/17/23 MRI breast: near complete imaging response to neoadjuvant chemotherapy.  Previously noted masses are markedly smaller with slight residual areas of enhancement around the clips.  These may represent post-chemo effect, clip artifact or some residual disease.  There is no internal mammary adenopathy.  Previously enlarged left axillary lymph nodes are smaller than noted on 12/30/22 MRI.  The largest lymph node now measures about 1.6 cm (previously 3 cm) and does not contain a clip.  The previously biopsied lymph node is no longer seen.\par \par 4/18/23 ECHO\par 1. Normal left ventricular size and systolic function\par 2. Left ventricular global longitudinal strain was -20.90%\par 3. Normal right ventricular size and systolic function\par 4. Mild tricuspid regurgitation\par 5. No evidence of pulmonary hypertension, pulmonary artery systolic pressure is 29 mmHg\par 6. No pericardial effusion\par 7. Compared to the previous TTE performed on 1/10/23, there have been no changes\par \par 5/19/23 left lumpectomy/SLNB: No invasive carcinoma seen.  DCIS present within the tumor bed area. 0/7 LNs.  ypTis ypN0\par \par 7/18/23 ECHO: LVEF 60-65% [de-identified] : moderately to poorly invasive ductal carcinoma [de-identified] : ER-, [place]- HER2+  [de-identified] : 12/27/22 ([facility]) 47 HBOC gene panel: negative for pathogenic mutations [FreeTextEntry1] : Weekly paclitaxel x 12 (1/23/23-4/11/23)\par Weekly carboplatin x 6 (1/23/23- 2/28/23); discontinued due to neutropenia.\par Kanjinti/pertuzumab every 3 weeks (1/23/23-5/9/23, 6/6/23-present) [de-identified] : Patient is feeling well, no complaints.  She is planning adjuvant RT with Dr. Rubio, awaiting start date.

## 2023-07-18 NOTE — PAST MEDICAL HISTORY
[Menstruating] : The patient is menstruating [Menarche Age ____] : age at menarche was [unfilled] [Total Preg ___] : G[unfilled] [Live Births ___] : P[unfilled]  [Premature ___] : Premature: [unfilled] [Age At Live Birth ___] : Age at live birth: [unfilled] [History of Hormone Replacement Treatment] : has no history of hormone replacement treatment [FreeTextEntry6] : no [FreeTextEntry7] : no [FreeTextEntry8] : yes

## 2023-07-28 ENCOUNTER — NON-APPOINTMENT (OUTPATIENT)
Age: 48
End: 2023-07-28

## 2023-08-08 ENCOUNTER — APPOINTMENT (OUTPATIENT)
Dept: INFUSION THERAPY | Facility: CLINIC | Age: 48
End: 2023-08-08

## 2023-08-08 ENCOUNTER — OUTPATIENT (OUTPATIENT)
Dept: OUTPATIENT SERVICES | Facility: HOSPITAL | Age: 48
LOS: 1 days | End: 2023-08-08
Payer: COMMERCIAL

## 2023-08-08 VITALS
SYSTOLIC BLOOD PRESSURE: 136 MMHG | RESPIRATION RATE: 16 BRPM | HEART RATE: 72 BPM | TEMPERATURE: 97 F | OXYGEN SATURATION: 100 % | DIASTOLIC BLOOD PRESSURE: 89 MMHG

## 2023-08-08 VITALS
WEIGHT: 173.06 LBS | DIASTOLIC BLOOD PRESSURE: 83 MMHG | HEIGHT: 62 IN | OXYGEN SATURATION: 99 % | SYSTOLIC BLOOD PRESSURE: 134 MMHG | TEMPERATURE: 97 F | RESPIRATION RATE: 18 BRPM | HEART RATE: 76 BPM

## 2023-08-08 DIAGNOSIS — C50.912 MALIGNANT NEOPLASM OF UNSPECIFIED SITE OF LEFT FEMALE BREAST: ICD-10-CM

## 2023-08-08 PROCEDURE — 96417 CHEMO IV INFUS EACH ADDL SEQ: CPT

## 2023-08-08 PROCEDURE — 96413 CHEMO IV INFUSION 1 HR: CPT

## 2023-08-08 RX ORDER — PERTUZUMAB 30 MG/ML
420 INJECTION, SOLUTION, CONCENTRATE INTRAVENOUS ONCE
Refills: 0 | Status: COMPLETED | OUTPATIENT
Start: 2023-08-08 | End: 2023-08-08

## 2023-08-08 RX ORDER — TRASTUZUMAB-DKST 420 MG/20ML
468 INJECTION, POWDER, LYOPHILIZED, FOR SOLUTION INTRAVENOUS ONCE
Refills: 0 | Status: COMPLETED | OUTPATIENT
Start: 2023-08-08 | End: 2023-08-08

## 2023-08-08 RX ADMIN — Medication 300 UNIT(S): at 14:15

## 2023-08-08 RX ADMIN — TRASTUZUMAB-DKST 468 MILLIGRAM(S): 420 INJECTION, POWDER, LYOPHILIZED, FOR SOLUTION INTRAVENOUS at 13:00

## 2023-08-08 RX ADMIN — PERTUZUMAB 420 MILLIGRAM(S): 30 INJECTION, SOLUTION, CONCENTRATE INTRAVENOUS at 14:10

## 2023-08-08 RX ADMIN — PERTUZUMAB 420 MILLIGRAM(S): 30 INJECTION, SOLUTION, CONCENTRATE INTRAVENOUS at 13:40

## 2023-08-08 RX ADMIN — TRASTUZUMAB-DKST 468 MILLIGRAM(S): 420 INJECTION, POWDER, LYOPHILIZED, FOR SOLUTION INTRAVENOUS at 13:30

## 2023-08-29 ENCOUNTER — APPOINTMENT (OUTPATIENT)
Dept: INFUSION THERAPY | Facility: CLINIC | Age: 48
End: 2023-08-29

## 2023-08-29 ENCOUNTER — APPOINTMENT (OUTPATIENT)
Dept: HEMATOLOGY ONCOLOGY | Facility: CLINIC | Age: 48
End: 2023-08-29
Payer: COMMERCIAL

## 2023-08-29 ENCOUNTER — OUTPATIENT (OUTPATIENT)
Dept: OUTPATIENT SERVICES | Facility: HOSPITAL | Age: 48
LOS: 1 days | End: 2023-08-29
Payer: COMMERCIAL

## 2023-08-29 VITALS
BODY MASS INDEX: 31.47 KG/M2 | OXYGEN SATURATION: 99 % | WEIGHT: 171 LBS | RESPIRATION RATE: 18 BRPM | DIASTOLIC BLOOD PRESSURE: 93 MMHG | SYSTOLIC BLOOD PRESSURE: 145 MMHG | HEIGHT: 62 IN | TEMPERATURE: 98.2 F | HEART RATE: 77 BPM

## 2023-08-29 VITALS
HEIGHT: 62 IN | HEART RATE: 77 BPM | WEIGHT: 171.96 LBS | OXYGEN SATURATION: 99 % | TEMPERATURE: 98 F | RESPIRATION RATE: 16 BRPM | SYSTOLIC BLOOD PRESSURE: 145 MMHG | DIASTOLIC BLOOD PRESSURE: 93 MMHG

## 2023-08-29 DIAGNOSIS — C50.912 MALIGNANT NEOPLASM OF UNSPECIFIED SITE OF LEFT FEMALE BREAST: ICD-10-CM

## 2023-08-29 PROCEDURE — 96413 CHEMO IV INFUSION 1 HR: CPT

## 2023-08-29 PROCEDURE — 99214 OFFICE O/P EST MOD 30 MIN: CPT

## 2023-08-29 PROCEDURE — 96417 CHEMO IV INFUS EACH ADDL SEQ: CPT

## 2023-08-29 RX ORDER — TRASTUZUMAB-DKST 420 MG/20ML
468 INJECTION, POWDER, LYOPHILIZED, FOR SOLUTION INTRAVENOUS ONCE
Refills: 0 | Status: COMPLETED | OUTPATIENT
Start: 2023-08-29 | End: 2023-08-29

## 2023-08-29 RX ORDER — PERTUZUMAB 30 MG/ML
420 INJECTION, SOLUTION, CONCENTRATE INTRAVENOUS ONCE
Refills: 0 | Status: COMPLETED | OUTPATIENT
Start: 2023-08-29 | End: 2023-08-29

## 2023-08-29 RX ADMIN — PERTUZUMAB 420 MILLIGRAM(S): 30 INJECTION, SOLUTION, CONCENTRATE INTRAVENOUS at 12:32

## 2023-08-29 RX ADMIN — TRASTUZUMAB-DKST 468 MILLIGRAM(S): 420 INJECTION, POWDER, LYOPHILIZED, FOR SOLUTION INTRAVENOUS at 12:21

## 2023-08-29 RX ADMIN — PERTUZUMAB 420 MILLIGRAM(S): 30 INJECTION, SOLUTION, CONCENTRATE INTRAVENOUS at 13:03

## 2023-08-29 RX ADMIN — TRASTUZUMAB-DKST 468 MILLIGRAM(S): 420 INJECTION, POWDER, LYOPHILIZED, FOR SOLUTION INTRAVENOUS at 11:46

## 2023-08-29 RX ADMIN — Medication 300 UNIT(S): at 13:30

## 2023-08-29 NOTE — HISTORY OF PRESENT ILLNESS
[Disease: _____________________] : Disease: [unfilled] [T: ___] : T[unfilled] [N: ___] : N[unfilled] [M: ___] : M[unfilled] [AJCC Stage: ____] : AJCC Stage: [unfilled] [Treatment Protocol] : Treatment Protocol [de-identified] : 46 y/o female with h/o left breast cancer s/p neoadjuvant TCHP, 5/19/23 left lumpectomy/SLNB, local tissue rearrangement and contralateral reduction. She is on adjuvant Kanjinti/pertuzumab.  She returns for f/u and tx.   PMHx:  HTN, multinodular goiter, followed by ENT, had 5 biopsies that were benign, last thyroid US last year.  Denies any compressive symptoms, such as dysphagia or dyspnea.    Patient initially experienced R breast heaviness which she describes as similar to menstrual symptoms in November 2022 prompting diagnostic workup that detected a left breast cancer, invasive ductal carcinoma, ER/ND (-) HER-2 (+) with metastasis to lymph node. Left breast and axillary lymph node were both biopsied on 12/9/22.   Denies family history of breast or ovarian CA. Denies any history of breast surgeries or biopsies.  12/2/22 B/L dxMMG/US ([facility]): The left breast 0.6 x 0.5 x 0.5 cm indistinct irregular hypoechoic mass at 10:00 14 CMFN corresponds with mammography and is suspicious for malignancy. The 0.9 x 0.8 x 0.7 cm irregular complex cystic mass at 2:00 4 CMFN is indeterminate. The 1.8 x 1.2 cm abnormally thickened lymph node at 2:00 16 CMFN is suspicious for malignancy. No mammographic or sonographic evidence of malignancy in the right breast. BIRADS 5.  12/9/22 biopsy x 3: A. Left breast 2:00 4cmFN core biopsy: radial scar with calcifications, columnar cell change, cystic apocrine metaplasia. B. Left breast 2:00 16cm FN core biopsy: cortically thickened lymph node in the left axilla, metastatic carcinoma morphologically similar to part C. C. Left breast 10:00 14cm FN core biopsy: invasive moderately to poorly differentiated ductal carcinoma with micropapillary features, spanning at least 7 mm in this material.  Adjacent benign breast tissue with fibrocystic changes.  ER-, ND-, HER2+   12/30/22 MRI:  1. Left breast cancer 10:00 (14N) (buckle clip), newly diagnosed cancer.  Measures about 6 mm. 2. Left breast, 12:00 (15N) (near chest wall), there is an irregular 32 x 12 x 14 mm mass corresponding to the area of distortion seen on the recent mammogram.  There is no clip in this mass.  Recommend second look ultrasound and biopsy.  If not seen on ultrasound, recommend stereotactic biopsy. 3. Left 2:00 position, 4cm from  nipple (top hat shaped clip) there is an irregular 5 mm mass corresponding to the biopsy showing radial scar. 4. Left axillary adenopathy.  Corresponds to biopsy (cork clip) showing metastatic cancer.  1/6/23 PETCT:  1. Multiple hypermetabolic left breast lesions and hypermetabolic left axillary lymphadenopathy, which is consistent with breast malignancy with local colton spread.  No evidence of distant FDG avid metastatic disease. 2. Diffuse left-sided asymmetric enlargement of the thyroid gland with heterogeneous attenuation, typical of multinodular goiter.  There is also slight rightward tracheal deviation due to mass effect without tracheal narrowing.  1/10/23 ECHO: LVEF 65-70%  1/10/23 L breast US: Left breast 12:00 14 cm from the nipple, there is an irregular 33 x 7 x 15 mm mass corresponding to MRI finding. US guided L breast core biopsy: Invasive ductal carcinoma, poorly-differentiated, DCIS. Concordant malignant.  2/2/23 port placement.  4/17/23 MRI breast: near complete imaging response to neoadjuvant chemotherapy.  Previously noted masses are markedly smaller with slight residual areas of enhancement around the clips.  These may represent post-chemo effect, clip artifact or some residual disease.  There is no internal mammary adenopathy.  Previously enlarged left axillary lymph nodes are smaller than noted on 12/30/22 MRI.  The largest lymph node now measures about 1.6 cm (previously 3 cm) and does not contain a clip.  The previously biopsied lymph node is no longer seen.  4/18/23 ECHO 1. Normal left ventricular size and systolic function 2. Left ventricular global longitudinal strain was -20.90% 3. Normal right ventricular size and systolic function 4. Mild tricuspid regurgitation 5. No evidence of pulmonary hypertension, pulmonary artery systolic pressure is 29 mmHg 6. No pericardial effusion 7. Compared to the previous TTE performed on 1/10/23, there have been no changes  5/19/23 left lumpectomy/SLNB: No invasive carcinoma seen.  DCIS present within the tumor bed area. 0/7 LNs.  ypTis ypN0  7/18/23 ECHO: LVEF 60-65% [de-identified] : moderately to poorly invasive ductal carcinoma [de-identified] : ER-, [place]- HER2+  [de-identified] : 12/27/22 ([facility]) 47 HBOC gene panel: negative for pathogenic mutations [FreeTextEntry1] : Weekly paclitaxel x 12 (1/23/23-4/11/23) Weekly carboplatin x 6 (1/23/23- 2/28/23); discontinued due to neutropenia. Kanjinti/pertuzumab every 3 weeks (1/23/23-5/9/23, 6/6/23-present) [de-identified] : Patient is feeling well, no complaints.

## 2023-09-01 ENCOUNTER — NON-APPOINTMENT (OUTPATIENT)
Age: 48
End: 2023-09-01

## 2023-09-06 NOTE — PHYSICAL EXAM
[No Supraclavicular Adenopathy] : no supraclavicular adenopathy [Examined in the supine and seated position] : examined in the supine and seated position [Symmetrical] : symmetrical [No dominant masses] : no dominant masses in right breast  [No Nipple Retraction] : no left nipple retraction [No Nipple Discharge] : no left nipple discharge [No Axillary Lymphadenopathy] : no right axillary lymphadenopathy [de-identified] : Port placed [de-identified] : Bilateral periareolar breast reconstruction incisions C/D/I [de-identified] : Transverse incision C/D/I

## 2023-09-06 NOTE — HISTORY OF PRESENT ILLNESS
[FreeTextEntry1] : 46 yo F presents for 3 month follow up. 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 Patient initially experienced R breast symptoms which she describes as similar to menstrual symptoms in November 2022 prompting diagnostic workup that detected L breast cancer. Of note, third biopsy also performed LEFT 2n4 showing radial scar with calcifications, also excised.  Final surgical pathology revealed no invasive carcinoma. DCIS present within the tumor bed area. Prior procedure site changes x 2. Margins negative. (0/7) lymph nodes negative for carcinoma. Denies any fever, chills, redness, pain, or discharge at the incision site.  Patient underwent segmental excision with breast conservation and reconstruction with breast tissue rearrangement and contralateral breast reduction for symmetry Dr. Abbasi. Patient underwent chemotherapy/immunotherapy with Dr. Chavis, currently receiving immunotherapy. Followed by Dr. Rubio for RT. B/L MRI performed 4/12/23 showed near complete imaging response to therapy.   Of note, left posterior cervical lymphadenopathy noted on physical exam during first office visit, FNA performed Feb 2023 negative for malignant cells.  3 clips noted in the left breast and one in the left axilla (4 total clips). 1. Biopsy proven metastasis to a left axillary lymph node (cork clip) 2. Biopsy proven left IDC 10:00 (14N)(buckle clip) 3. Biopsy proven radial scar left 2:00 (4N)(top hat) 4. Biopsy left 12:00(15N)(ribbon) = IDC  Denies family history of breast or ovarian CA. [facility] 47 gene panel negative.

## 2023-09-06 NOTE — ASSESSMENT
[FreeTextEntry1] : 48 yo F presents for 3 month follow up. S/p neoadjuvant chemotherapy, L double localized lumpectomy, targeted L axillary node excision, SLNB, and frozen section for LEFT IDC ER/MA (-) HER-2 (+) with metastasis to lymph node seen on diagnostic mammogram as 0.6 cm irregular hypoechoic mass at LEFT 10n14 and 1.8 cm abnormal lymph node. Final surgical pathology revealed no invasive carcinoma. DCIS present within the tumor bed area. Prior procedure site changes x 2. Margins negative. (0/7) lymph nodes negative for carcinoma. Patient will continue with medical oncology, plastic surgery, and RT. Repeat sozo today.............She is to return in December 2023 for B/L MG & US with re-examination. Patient verbalized understanding and agreement of plan.

## 2023-09-06 NOTE — DATA REVIEWED
[FreeTextEntry1] : 12/2/22 B/L dxMMG/US ([facility]): The left breast 0.6 x 0.5 x 0.5 cm indistinct irregular hypoechoic mass at 10:00 14 CMFN corresponds with mammography and is suspicious for malignancy. Left breast ultrasound-guided core biopsy with microclip placement and postprocedural mammographic correlation is recommended. The 0.9 x 0.8 x 0.7 cm irregular complex cystic mass at 2:00 4 CMFN is indeterminate. Left breast ultrasound-guided core biopsy with microclip placement and postprocedural mammographic correlation is recommended. The 1.8 x 1.2 cm abnormally thickened lymph node at 2:00 16 CMFN is suspicious for malignancy. Left breast ultrasound-guided core biopsy with microclip placement and postprocedural mammographic correlation is recommended. No mammographic or sonographic evidence of malignancy in the right breast. BIRADS 5.  12/27/22 ([facility]) 47 HBOC gene panel: negative for pathogenic mutations.   12/30/22 ([facility]) B/L MRI:  1. Left breast cancer 10:00 14cmfn (buckle clip) , newly diagnosed cancer. Measures about 0.6 cm. 2. Left breast, 12:00 15cmfn (near chest wall), there is an irregular 3.2 x 1.2 x 1.4 cm mass corresponding to the area of distortion seen on the recent mammogram. There is no clip in this mass. Recommend second look ultrasound and biopsy. If not seen on ultrasound, recommend stereotactic biopsy.  3. Left 2:00 position 4cmfn (top-hat shaped clip) there is an irregular 0.5 cm mass corresponding to the biopsy showing radial scar. 4. Left axillary adenopathy. Corresponds to biopsy (cork clip) showing metastatic cancer. RECOMMENDATION: Ultrasound biopsy. BI-RADS 4C- Suspicious Finding(s) - High Suspicion for Malignancy   1/10/23 L Targeted US ([facility]): LEFT breast 12:00 14 cm from the nipple, there is an irregular 33 x 7 x 15 mm mass corresponding to the MRI finding. This was targeted for subsequent ultrasound biopsy performed today. BIRADS 4.  1/10/23 L US Guided Biopsy ([facility]): L 12:00 14cmFN pathology shows Invasive ductal carcinoma, poorly-differentiated, DCIS. Concordant malignant.  2/2/23 L Posterior Cervical LN FNA ([facility]): NEGATIVE FOR MALIGNANT CELLS. Heterogeneous population of lymphocytes. No epithelial cells visualized. Cell block, noncontributory. Note: Flow cytometry will be reported in an addendum.   4/17/23 B/L MRI ([facility]): LEFT BREAST: 3 clips noted in the left breast and one in the left axilla (4 total clips). 1. Biopsy proven metastasis to a left axillary lymph node (cork clip) 2. Biopsy proven left IDC 10:00 (14N)(buckle clip) 3. Biopsy proven radial scar left 2:00 (4N)(top hat) 4. Biopsy left 12:00(15N)(ribbon) = IDC  Previously noted masses are markedly smaller, with slight residual areas of enhancement around the clips.  These may represent post-chemo effect, clip artifact or some residual disease.  5/19/23 L Lumpectomy and Targeted LN/SLNB Pathology: No invasive carcinoma is seen. 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 DCIS is 5 mm form the posterior margin. Prior procedure site changes (x2). Margins negative. (0/7) LN negative.

## 2023-09-06 NOTE — PAST MEDICAL HISTORY
[Menstruating] : The patient is menstruating [Menarche Age ____] : age at menarche was [unfilled] [History of Hormone Replacement Treatment] : has no history of hormone replacement treatment [Definite ___ (Date)] : the last menstrual period was [unfilled] [Regular Cycle Intervals] : have been regular [Total Preg ___] : G[unfilled] [Live Births ___] : P[unfilled]  [Premature ___] : Premature: [unfilled] [Age At Live Birth ___] : Age at live birth: [unfilled] [FreeTextEntry6] : no [FreeTextEntry7] : no [FreeTextEntry8] : yes

## 2023-09-08 ENCOUNTER — EMERGENCY (EMERGENCY)
Facility: HOSPITAL | Age: 48
LOS: 1 days | Discharge: HOME | End: 2023-09-08
Attending: EMERGENCY MEDICINE | Admitting: EMERGENCY MEDICINE
Payer: COMMERCIAL

## 2023-09-08 VITALS
DIASTOLIC BLOOD PRESSURE: 96 MMHG | HEIGHT: 62 IN | TEMPERATURE: 98 F | SYSTOLIC BLOOD PRESSURE: 167 MMHG | HEART RATE: 60 BPM | OXYGEN SATURATION: 98 % | WEIGHT: 171.96 LBS | RESPIRATION RATE: 18 BRPM

## 2023-09-08 VITALS
RESPIRATION RATE: 18 BRPM | TEMPERATURE: 97.9 F | SYSTOLIC BLOOD PRESSURE: 153 MMHG | WEIGHT: 171.2 LBS | OXYGEN SATURATION: 98 % | HEART RATE: 68 BPM | DIASTOLIC BLOOD PRESSURE: 93 MMHG | BODY MASS INDEX: 31.31 KG/M2

## 2023-09-08 VITALS
DIASTOLIC BLOOD PRESSURE: 88 MMHG | SYSTOLIC BLOOD PRESSURE: 140 MMHG | HEART RATE: 61 BPM | OXYGEN SATURATION: 98 % | TEMPERATURE: 98 F | RESPIRATION RATE: 17 BRPM

## 2023-09-08 DIAGNOSIS — M79.601 PAIN IN RIGHT ARM: ICD-10-CM
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
## 2023-09-08 PROCEDURE — 93971 EXTREMITY STUDY: CPT | Mod: 26,RT

## 2023-09-08 PROCEDURE — 99284 EMERGENCY DEPT VISIT MOD MDM: CPT | Mod: 25

## 2023-09-08 PROCEDURE — 99284 EMERGENCY DEPT VISIT MOD MDM: CPT

## 2023-09-08 PROCEDURE — 93971 EXTREMITY STUDY: CPT

## 2023-09-08 RX ORDER — METHOCARBAMOL 500 MG/1
750 TABLET, FILM COATED ORAL ONCE
Refills: 0 | Status: COMPLETED | OUTPATIENT
Start: 2023-09-08 | End: 2023-09-08

## 2023-09-08 RX ORDER — METHOCARBAMOL 500 MG/1
1 TABLET, FILM COATED ORAL
Qty: 21 | Refills: 0
Start: 2023-09-08 | End: 2023-09-14

## 2023-09-08 RX ORDER — IBUPROFEN 200 MG
600 TABLET ORAL ONCE
Refills: 0 | Status: COMPLETED | OUTPATIENT
Start: 2023-09-08 | End: 2023-09-08

## 2023-09-08 RX ORDER — IBUPROFEN 200 MG
1 TABLET ORAL
Qty: 21 | Refills: 0
Start: 2023-09-08 | End: 2023-09-14

## 2023-09-08 RX ADMIN — METHOCARBAMOL 750 MILLIGRAM(S): 500 TABLET, FILM COATED ORAL at 09:41

## 2023-09-08 RX ADMIN — Medication 600 MILLIGRAM(S): at 09:41

## 2023-09-08 NOTE — HISTORY OF PRESENT ILLNESS
[FreeTextEntry1] : 48 y/o F PMH multinodular goiter and now LEFT ER-/MN-/HER2+ clinical prognostic stage IIB ([id number]; ypTisN0) IDC s/p NA TCHP and BCS w/ SLNBx (no residual invasive disease, G2-3, DCIS+, margin-, 0/7 LNs), with total of 1 year of HP planned adjuvantly.  presents per Dr. Leal to discuss radiation therapy.    9/8/2023: OTV 1855cGy/4240cGy to L breast/CW/nodes. Reports feeling fine on treated side - denies fatigue, appreciable breast symptoms; skin is clear with faint erythema (pt plans to  Rx cream today). Endorses pain in RUE that has been "excruciating" for the past week. She notes the pain radiates from bicep area down forearm and that she is taking Tylenol with mild relief. Of note, pt states she has history of cervical disc issue.  9/1/2023: OTV 795cGy/4240cGy to L breast/CW/nodes   6/6/2023: Consultation Reports feeling well overall. Notes a tugging sensation at post-op breast with overuse of the upper extremities. Of note, patient works in [employer] and commutes from [facility] in [place], [place].    History of Present Illness: ________________________________  11/2022 Patient initially experienced R breast heaviness which she describes as similar to menstrual symptoms in November 2022 prompting diagnostic workup that detected a left breast cancer, invasive ductal carcinoma, ER/MN (-) HER-2 (+) with metastasis to lymph node. Left breast and axillary lymph node were both biopsied on 12/9/22.  12/2/22 B/L dxMMG/US ([facility]): The left breast 0.6 x 0.5 x 0.5 cm indistinct irregular hypoechoic mass at 10:00 14 CMFN corresponds with mammography and is suspicious for malignancy. Left breast ultrasound-guided core biopsy with microclip placement and postprocedural mammographic correlation is recommended. The 0.9 x 0.8 x 0.7 cm irregular complex cystic mass at 2:00 4 CMFN is indeterminate. Left breast ultrasound-guided core biopsy with microclip placement and postprocedural mammographic correlation is recommended. The 1.8 x 1.2 cm abnormally thickened lymph node at 2:00 16 CMFN is suspicious for malignancy. Left breast ultrasound-guided core biopsy with microclip placement and postprocedural mammographic correlation is recommended. No mammographic or sonographic evidence of malignancy in the right breast. BIRADS 5.  12/27/22 ([facility]) 47 HBOC gene panel: negative for pathogenic mutations.   12/30/22 ([facility]) B/L MRI:  1. Left breast cancer 10:00 14cmfn (buckle clip) , newly diagnosed cancer. Measures about 0.6 cm. 2. Left breast, 12:00 15cmfn (near chest wall), there is an irregular 3.2 x 1.2 x 1.4 cm mass corresponding to the area of distortion seen on the recent mammogram. There is no clip in this mass. Recommend second look ultrasound and biopsy. If not seen on ultrasound, recommend stereotactic biopsy.  3. Left 2:00 position 4cmfn (top-hat shaped clip) there is an irregular 0.5 cm mass corresponding to the biopsy showing radial scar. 4. Left axillary adenopathy. Corresponds to biopsy (cork clip) showing metastatic cancer. RECOMMENDATION: Ultrasound biopsy. BI-RADS 4C- Suspicious Finding(s) - High Suspicion for Malignancy   1/6/23 PETCT:  1. Multiple hypermetabolic left breast lesions and hypermetabolic left axillary lymphadenopathy, which is consistent with breast malignancy with local colton spread. No evidence of distant FDG avid metastatic disease. 2. Diffuse left-sided asymmetric enlargement of the thyroid gland with heterogeneous attenuation, typical of multinodular goiter. There is also slight rightward tracheal deviation due to mass effect without tracheal narrowing.  1/10/23 L Targeted US ([facility]): LEFT breast 12:00 14 cm from the nipple, there is an irregular 33 x 7 x 15 mm mass corresponding to the MRI finding. This was targeted for subsequent ultrasound biopsy performed today. BIRADS 4.  1/10/23 L US Guided Biopsy ([facility]): L 12:00 14cmFN pathology shows Invasive ductal carcinoma, poorly-differentiated, DCIS. Concordant malignant.  2/2/23 L Posterior Cervical LN FNA ([facility]): NEGATIVE FOR MALIGNANT CELLS. Heterogeneous population of lymphocytes. No epithelial cells visualized. Cell block, noncontributory. Note: Flow cytometry will be reported in an addendum.   4/17/23 B/L MRI ([facility]): LEFT BREAST: 3 clips noted in the left breast and one in the left axilla (4 total clips). 1. Biopsy proven metastasis to a left axillary lymph node (cork clip) 2. Biopsy proven left IDC 10:00 (14N)(buckle clip) 3. Biopsy proven radial scar left 2:00 (4N)(top hat) 4. Biopsy left 12:00(15N)(ribbon) = IDC  Previously noted masses are markedly smaller, with slight residual areas of enhancement around the clips. These may represent post-chemo effect, clip artifact or some residual disease.  5/19/23 L Lumpectomy and Targeted LN/SLNB Pathology: No invasive carcinoma is seen. 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 DCIS is 5 mm form the posterior margin. Prior procedure site changes (x2). Margins negative. (0/7) LN negative.  Systemic therapy: Paclitaxel x 12 (1/23/23-4/11/23) Weekly carboplatin x 6 (1/23/23- 2/28/23); discontinued due to neutropenia. Kanjinti/pertuzumab every 3 weeks (1/23/23-5/9/23) Trastuzumab and Pertuzumab to (planned for 1 year), starting (6/6/23 - presents)

## 2023-09-08 NOTE — VITALS
[Maximal Pain Intensity: 8/10] : 8/10 [Pain Location: ___] : Pain Location: [unfilled] [Pain Interferes with ADLs] : Pain interferes with activities of daily living. [OTC] : OTC [90: Able to carry normal activity; minor signs or symptoms of disease.] : 90: Able to carry normal activity; minor signs or symptoms of disease.  [ECOG Performance Status: 1 - Restricted in physically strenuous activity but ambulatory and able to carry out work of a light or sedentary nature] : Performance Status: 1 - Restricted in physically strenuous activity but ambulatory and able to carry out work of a light or sedentary nature, e.g., light house work, office work

## 2023-09-08 NOTE — ED PROVIDER NOTE - OBJECTIVE STATEMENT
47 F co R arm pain- banged her arm on sth a few days ago- now co diffuse arm pain  ho brca on xrt currently- L chest port  no ho dvt/pe  no swelling/redness  mod severity

## 2023-09-08 NOTE — DISEASE MANAGEMENT
[Pathological] : TNM Stage: p [TTNM] : 2 [NTNM] : 1 [MTNM] : X [IIB] : IIB [de-identified] : 1871uGy [de-identified] : 3021vQi [de-identified] : Left breast/chest wall/nodes

## 2023-09-08 NOTE — ED PROVIDER NOTE - PATIENT PORTAL LINK FT
You can access the FollowMyHealth Patient Portal offered by [facility] by registering at the following website: [email or web address]. By joining [employer]’s FollowMyHealth portal, you will also be able to view your health information using other applications (apps) compatible with our system.

## 2023-09-08 NOTE — HISTORY OF PRESENT ILLNESS
[FreeTextEntry1] : 46 y/o F PMH multinodular goiter and now LEFT ER-/DE-/HER2+ clinical prognostic stage IIB ([id number]; ypTisN0) IDC s/p NA TCHP and BCS w/ SLNBx (no residual invasive disease, G2-3, DCIS+, margin-, 0/7 LNs), with total of 1 year of HP planned adjuvantly.  presents per Dr. Leal to discuss radiation therapy.    9/8/2023: OTV 1855cGy/4240cGy to L breast/CW/nodes. Reports feeling fine on treated side - denies fatigue, appreciable breast symptoms; skin is clear with faint erythema (pt plans to  Rx cream today). Endorses pain in RUE that has been "excruciating" for the past week. She notes the pain radiates from bicep area down forearm and that she is taking Tylenol with mild relief. Of note, pt states she has history of cervical disc issue.  9/1/2023: OTV 795cGy/4240cGy to L breast/CW/nodes   6/6/2023: Consultation Reports feeling well overall. Notes a tugging sensation at post-op breast with overuse of the upper extremities. Of note, patient works in [employer] and commutes from [facility] in [place], [place].    History of Present Illness: ________________________________  11/2022 Patient initially experienced R breast heaviness which she describes as similar to menstrual symptoms in November 2022 prompting diagnostic workup that detected a left breast cancer, invasive ductal carcinoma, ER/DE (-) HER-2 (+) with metastasis to lymph node. Left breast and axillary lymph node were both biopsied on 12/9/22.  12/2/22 B/L dxMMG/US ([facility]): The left breast 0.6 x 0.5 x 0.5 cm indistinct irregular hypoechoic mass at 10:00 14 CMFN corresponds with mammography and is suspicious for malignancy. Left breast ultrasound-guided core biopsy with microclip placement and postprocedural mammographic correlation is recommended. The 0.9 x 0.8 x 0.7 cm irregular complex cystic mass at 2:00 4 CMFN is indeterminate. Left breast ultrasound-guided core biopsy with microclip placement and postprocedural mammographic correlation is recommended. The 1.8 x 1.2 cm abnormally thickened lymph node at 2:00 16 CMFN is suspicious for malignancy. Left breast ultrasound-guided core biopsy with microclip placement and postprocedural mammographic correlation is recommended. No mammographic or sonographic evidence of malignancy in the right breast. BIRADS 5.  12/27/22 ([facility]) 47 HBOC gene panel: negative for pathogenic mutations.   12/30/22 ([facility]) B/L MRI:  1. Left breast cancer 10:00 14cmfn (buckle clip) , newly diagnosed cancer. Measures about 0.6 cm. 2. Left breast, 12:00 15cmfn (near chest wall), there is an irregular 3.2 x 1.2 x 1.4 cm mass corresponding to the area of distortion seen on the recent mammogram. There is no clip in this mass. Recommend second look ultrasound and biopsy. If not seen on ultrasound, recommend stereotactic biopsy.  3. Left 2:00 position 4cmfn (top-hat shaped clip) there is an irregular 0.5 cm mass corresponding to the biopsy showing radial scar. 4. Left axillary adenopathy. Corresponds to biopsy (cork clip) showing metastatic cancer. RECOMMENDATION: Ultrasound biopsy. BI-RADS 4C- Suspicious Finding(s) - High Suspicion for Malignancy   1/6/23 PETCT:  1. Multiple hypermetabolic left breast lesions and hypermetabolic left axillary lymphadenopathy, which is consistent with breast malignancy with local colton spread. No evidence of distant FDG avid metastatic disease. 2. Diffuse left-sided asymmetric enlargement of the thyroid gland with heterogeneous attenuation, typical of multinodular goiter. There is also slight rightward tracheal deviation due to mass effect without tracheal narrowing.  1/10/23 L Targeted US ([facility]): LEFT breast 12:00 14 cm from the nipple, there is an irregular 33 x 7 x 15 mm mass corresponding to the MRI finding. This was targeted for subsequent ultrasound biopsy performed today. BIRADS 4.  1/10/23 L US Guided Biopsy ([facility]): L 12:00 14cmFN pathology shows Invasive ductal carcinoma, poorly-differentiated, DCIS. Concordant malignant.  2/2/23 L Posterior Cervical LN FNA ([facility]): NEGATIVE FOR MALIGNANT CELLS. Heterogeneous population of lymphocytes. No epithelial cells visualized. Cell block, noncontributory. Note: Flow cytometry will be reported in an addendum.   4/17/23 B/L MRI ([facility]): LEFT BREAST: 3 clips noted in the left breast and one in the left axilla (4 total clips). 1. Biopsy proven metastasis to a left axillary lymph node (cork clip) 2. Biopsy proven left IDC 10:00 (14N)(buckle clip) 3. Biopsy proven radial scar left 2:00 (4N)(top hat) 4. Biopsy left 12:00(15N)(ribbon) = IDC  Previously noted masses are markedly smaller, with slight residual areas of enhancement around the clips. These may represent post-chemo effect, clip artifact or some residual disease.  5/19/23 L Lumpectomy and Targeted LN/SLNB Pathology: No invasive carcinoma is seen. 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 DCIS is 5 mm form the posterior margin. Prior procedure site changes (x2). Margins negative. (0/7) LN negative.  Systemic therapy: Paclitaxel x 12 (1/23/23-4/11/23) Weekly carboplatin x 6 (1/23/23- 2/28/23); discontinued due to neutropenia. Kanjinti/pertuzumab every 3 weeks (1/23/23-5/9/23) Trastuzumab and Pertuzumab to (planned for 1 year), starting (6/6/23 - presents)

## 2023-09-08 NOTE — ED PROVIDER NOTE - CLINICAL SUMMARY MEDICAL DECISION MAKING FREE TEXT BOX
arm pain- recent mild trauma- but now w diffuse pain and brca on XRT - will RO vte  us neg- d/c home- motrin/robaxin prn

## 2023-09-08 NOTE — ED ADULT NURSE NOTE - OBJECTIVE STATEMENT
endorses "banging" right arm then developed R-scapular tightness/pain, constant, relieved with tylenol, denies sob, cp, diaphoresis, wilkerson

## 2023-09-08 NOTE — ED PROVIDER NOTE - CPE EDP RESP NORM
"SUBJECTIVE:  Chantelle Villagomez is a 8 m.o. female here accompanied by mother for Ear Concern (Patient is still pulling at her ear) and Diarrhea    HPI    Patient seen about 2 weeks prior and found to have RAOM and treated wth amoxil, but mom state that patient is still pulling at right ear. No fever or uri sxs. Did start having diarrhea about 1-2 days prior, after completion of antibiotics, no vomiting. Baseline po and activity with good UOP. Tolerated antibiotics without issue.     Princesss allergies, medications, history, and problem list were updated as appropriate.    Review of Systems   A comprehensive review of symptoms was completed and negative except as noted above.    OBJECTIVE:  Vital signs  Vitals:    10/04/23 0945   Pulse: 108   Temp: 97.9 °F (36.6 °C)   TempSrc: Axillary   SpO2: 100%   Weight: 9.775 kg (21 lb 8.8 oz)   Height: 2' 4.74" (0.73 m)        Physical Exam  Vitals and nursing note reviewed.   Constitutional:       General: She is active.   HENT:      Right Ear: Tympanic membrane normal.      Left Ear: Tympanic membrane normal.      Mouth/Throat:      Mouth: Mucous membranes are moist.   Eyes:      Extraocular Movements: Extraocular movements intact.      Conjunctiva/sclera: Conjunctivae normal.   Cardiovascular:      Rate and Rhythm: Normal rate and regular rhythm.      Pulses: Normal pulses.   Pulmonary:      Effort: Pulmonary effort is normal.      Breath sounds: Normal breath sounds. No wheezing or rales.   Abdominal:      General: Bowel sounds are normal. There is no distension.      Palpations: Abdomen is soft.      Tenderness: There is no abdominal tenderness.   Skin:     General: Skin is warm.      Capillary Refill: Capillary refill takes less than 2 seconds.      Findings: No rash.   Neurological:      Mental Status: She is alert.          ASSESSMENT/PLAN:  1. Follow-up otitis media, resolved    2. Diarrhea, unspecified type      Clear TM bilaterally, no further antibiotics needed. " Discussed supportive care for diarrhea. Family expressed agreement and understanding of plan and all questions were answered. Follow up PRN for worsening symptoms.        No results found for this or any previous visit (from the past 24 hour(s)).    Follow Up:  No follow-ups on file.         normal...

## 2023-09-08 NOTE — ED PROVIDER NOTE - NSFOLLOWUPINSTRUCTIONS_ED_ALL_ED_FT
Cervical Radiculopathy  Close-up of the nerves of the cervical spine.  Cervical radiculopathy happens when a nerve in the neck (a cervical nerve) is pinched or bruised. This condition can happen because of an injury to the cervical spine (vertebrae) in the neck, or as part of the normal aging process. Pressure on the cervical nerves can cause pain or numbness that travels from the neck all the way down to the arm and fingers. This condition usually gets better with rest. Treatment may be needed if the condition does not improve.    What are the causes?  This condition may be caused by:  A neck injury.  A bulging (herniated) disk.  Muscle spasms.  Muscle tightness in the neck due to overuse.  Arthritis.  Breakdown or degeneration in the bones and joints of the spine (spondylosis) due to aging.  Bone spurs that may develop near the cervical nerves.  What are the signs or symptoms?  Symptoms of this condition include:  Pain. The pain may travel from the neck to the arm and hand. The pain can be severe or irritating. It may get worse when you move your neck.  Numbness or tingling in your arm or hand.  Weakness in the affected arm and hand, in severe cases.  How is this diagnosed?  This condition may be diagnosed based on your symptoms, your medical history, and a physical exam. You may also have tests, including:  X-rays.  CT scan.  MRI.  Electromyogram (EMG).  Nerve conduction tests.  How is this treated?  In many cases, treatment is not needed for this condition. With rest, the condition usually gets better over time. If treatment is needed, options may include:  Wearing a soft neck collar (cervical collar) for short periods of time.  Doing physical therapy to strengthen your neck muscles.  Taking medicines. These may include NSAIDs, such as ibuprofen, or oral corticosteroids.  Having spinal injections, in severe cases.  Having surgery. This may be needed if other treatments do not help. Different types of surgery may be done depending on the cause of this condition.  Follow these instructions at home:  If you have a cervical collar:    Wear it as told by your health care provider. Remove it only as told by your health care provider.  Ask your health care provider if you can remove the cervical collar for cleaning and bathing. If you are allowed to remove the collar for cleaning or bathing:  Follow instructions from your health care provider about how to remove the collar safely.  Clean the collar by wiping it with mild soap and water and drying it completely.  Take out any removable pads in the collar every 1–2 days, and wash them by hand with soap and water. Let them air-dry completely before you put them back in the collar.  Check your skin under the collar for irritation or sores. If you see any, tell your health care provider.  Managing pain    Bag of ice on a towel on the skin.   A heating pad for use on the painful area.  Take over-the-counter and prescription medicines only as told by your health care provider.  If directed, put ice on the affected area. To do this:  If you have a soft neck collar, remove it as told by your health care provider.  Put ice in a plastic bag.  Place a towel between your skin and the bag.  Leave the ice on for 20 minutes, 2–3 times a day.  Remove the ice if your skin turns bright red. This is very important. If you cannot feel pain, heat, or cold, you have a greater risk of damage to the area.  If applying ice does not help, you can try using heat. Use the heat source that your health care provider recommends, such as a moist heat pack or a heating pad.  Place a towel between your skin and the heat source.  Leave the heat on for 20–30 minutes.  Remove the heat if your skin turns bright red. This is especially important if you are unable to feel pain, heat, or cold. You have a greater risk of getting burned.  Try a gentle neck and shoulder massage to help relieve symptoms.  Activity    Rest as needed.  Return to your normal activities as told by your health care provider. Ask your health care provider what activities are safe for you.  Do stretching and strengthening exercises as told by your health care provider or your physical therapist.  You may have to avoid lifting. Ask your health care provider how much you can safely lift.  General instructions    Use a flat pillow when you sleep.  Do not drive while wearing a cervical collar. If you do not have a cervical collar, ask your health care provider if it is safe to drive while your neck heals.  Ask your health care provider if the medicine prescribed to you requires you to avoid driving or using machinery.  Do not use any products that contain nicotine or tobacco. These products include cigarettes, chewing tobacco, and vaping devices, such as e-cigarettes. If you need help quitting, ask your health care provider.  Keep all follow-up visits. This is important.  Contact a health care provider if:  Your condition does not improve with treatment.  Get help right away if:  Your pain gets much worse and is not controlled with medicines.  You have weakness or numbness in your hand, arm, face, or leg.  You have a high fever.  You have a stiff, rigid neck.  You lose control of your bowels or your bladder (have incontinence).  You have trouble with walking, balance, or speaking.  Summary  Cervical radiculopathy happens when a nerve in the neck is pinched or bruised.  A nerve can get pinched from a bulging disk, arthritis, muscle spasms, or an injury to the neck.  Symptoms include pain, tingling, or numbness radiating from the neck to the arm or hand. Weakness can also occur in severe cases.  Treatment may include rest, wearing a cervical collar, and physical therapy. Medicines may be prescribed to help with pain. In severe cases, injections or surgery may be needed.  This information is not intended to replace advice given to you by your health care provider. Make sure you discuss any questions you have with your health care provider.    Document Revised: 06/23/2022 Document Reviewed: 06/23/2022

## 2023-09-08 NOTE — DISEASE MANAGEMENT
[Pathological] : TNM Stage: p [TTNM] : 2 [NTNM] : 1 [MTNM] : X [IIB] : IIB [de-identified] : 8337tGy [de-identified] : [id number] [de-identified] : Left breast/chest wall/nodes

## 2023-09-12 ENCOUNTER — APPOINTMENT (OUTPATIENT)
Dept: BREAST CENTER | Facility: CLINIC | Age: 48
End: 2023-09-12
Payer: SELF-PAY

## 2023-09-12 VITALS
SYSTOLIC BLOOD PRESSURE: 146 MMHG | HEART RATE: 74 BPM | BODY MASS INDEX: 31.64 KG/M2 | HEIGHT: 62 IN | WEIGHT: 171.96 LBS | DIASTOLIC BLOOD PRESSURE: 91 MMHG

## 2023-09-12 DIAGNOSIS — Z91.89 OTHER SPECIFIED PERSONAL RISK FACTORS, NOT ELSEWHERE CLASSIFIED: ICD-10-CM

## 2023-09-12 PROCEDURE — 93702 BIS XTRACELL FLUID ANALYSIS: CPT | Mod: NC

## 2023-09-12 PROCEDURE — 99213 OFFICE O/P EST LOW 20 MIN: CPT

## 2023-09-15 ENCOUNTER — NON-APPOINTMENT (OUTPATIENT)
Age: 48
End: 2023-09-15

## 2023-09-19 ENCOUNTER — APPOINTMENT (OUTPATIENT)
Dept: INFUSION THERAPY | Facility: CLINIC | Age: 48
End: 2023-09-19

## 2023-09-19 ENCOUNTER — OUTPATIENT (OUTPATIENT)
Dept: OUTPATIENT SERVICES | Facility: HOSPITAL | Age: 48
LOS: 1 days | End: 2023-09-19
Payer: COMMERCIAL

## 2023-09-19 VITALS
DIASTOLIC BLOOD PRESSURE: 96 MMHG | SYSTOLIC BLOOD PRESSURE: 157 MMHG | WEIGHT: 169.98 LBS | OXYGEN SATURATION: 96 % | HEIGHT: 62 IN | HEART RATE: 81 BPM | RESPIRATION RATE: 18 BRPM | TEMPERATURE: 98 F

## 2023-09-19 DIAGNOSIS — C50.912 MALIGNANT NEOPLASM OF UNSPECIFIED SITE OF LEFT FEMALE BREAST: ICD-10-CM

## 2023-09-19 PROCEDURE — 96417 CHEMO IV INFUS EACH ADDL SEQ: CPT

## 2023-09-19 PROCEDURE — 96413 CHEMO IV INFUSION 1 HR: CPT

## 2023-09-19 RX ORDER — PERTUZUMAB 30 MG/ML
420 INJECTION, SOLUTION, CONCENTRATE INTRAVENOUS ONCE
Refills: 0 | Status: COMPLETED | OUTPATIENT
Start: 2023-09-19 | End: 2023-09-19

## 2023-09-19 RX ORDER — TRASTUZUMAB-DKST 420 MG/20ML
468 INJECTION, POWDER, LYOPHILIZED, FOR SOLUTION INTRAVENOUS ONCE
Refills: 0 | Status: COMPLETED | OUTPATIENT
Start: 2023-09-19 | End: 2023-09-19

## 2023-09-19 RX ADMIN — TRASTUZUMAB-DKST 468 MILLIGRAM(S): 420 INJECTION, POWDER, LYOPHILIZED, FOR SOLUTION INTRAVENOUS at 13:00

## 2023-09-19 RX ADMIN — PERTUZUMAB 420 MILLIGRAM(S): 30 INJECTION, SOLUTION, CONCENTRATE INTRAVENOUS at 14:10

## 2023-09-19 RX ADMIN — PERTUZUMAB 420 MILLIGRAM(S): 30 INJECTION, SOLUTION, CONCENTRATE INTRAVENOUS at 13:40

## 2023-09-19 RX ADMIN — Medication 300 UNIT(S): at 14:13

## 2023-09-19 RX ADMIN — TRASTUZUMAB-DKST 468 MILLIGRAM(S): 420 INJECTION, POWDER, LYOPHILIZED, FOR SOLUTION INTRAVENOUS at 13:30

## 2023-10-10 ENCOUNTER — APPOINTMENT (OUTPATIENT)
Dept: HEMATOLOGY ONCOLOGY | Facility: CLINIC | Age: 48
End: 2023-10-10
Payer: COMMERCIAL

## 2023-10-10 ENCOUNTER — OUTPATIENT (OUTPATIENT)
Dept: OUTPATIENT SERVICES | Facility: HOSPITAL | Age: 48
LOS: 1 days | End: 2023-10-10
Payer: COMMERCIAL

## 2023-10-10 ENCOUNTER — APPOINTMENT (OUTPATIENT)
Dept: INFUSION THERAPY | Facility: CLINIC | Age: 48
End: 2023-10-10

## 2023-10-10 VITALS
SYSTOLIC BLOOD PRESSURE: 136 MMHG | DIASTOLIC BLOOD PRESSURE: 82 MMHG | OXYGEN SATURATION: 98 % | HEART RATE: 72 BPM | RESPIRATION RATE: 17 BRPM | TEMPERATURE: 98 F

## 2023-10-10 VITALS
SYSTOLIC BLOOD PRESSURE: 149 MMHG | HEIGHT: 62 IN | HEART RATE: 68 BPM | OXYGEN SATURATION: 96 % | WEIGHT: 173.06 LBS | DIASTOLIC BLOOD PRESSURE: 87 MMHG | RESPIRATION RATE: 16 BRPM | TEMPERATURE: 98 F

## 2023-10-10 VITALS
SYSTOLIC BLOOD PRESSURE: 169 MMHG | RESPIRATION RATE: 18 BRPM | HEIGHT: 62 IN | DIASTOLIC BLOOD PRESSURE: 87 MMHG | TEMPERATURE: 98.4 F | BODY MASS INDEX: 31.83 KG/M2 | HEART RATE: 68 BPM | WEIGHT: 173 LBS | OXYGEN SATURATION: 96 %

## 2023-10-10 DIAGNOSIS — C50.912 MALIGNANT NEOPLASM OF UNSPECIFIED SITE OF LEFT FEMALE BREAST: ICD-10-CM

## 2023-10-10 PROCEDURE — 96413 CHEMO IV INFUSION 1 HR: CPT

## 2023-10-10 PROCEDURE — 96417 CHEMO IV INFUS EACH ADDL SEQ: CPT

## 2023-10-10 PROCEDURE — 99214 OFFICE O/P EST MOD 30 MIN: CPT

## 2023-10-10 RX ORDER — PERTUZUMAB 30 MG/ML
420 INJECTION, SOLUTION, CONCENTRATE INTRAVENOUS ONCE
Refills: 0 | Status: COMPLETED | OUTPATIENT
Start: 2023-10-10 | End: 2023-10-10

## 2023-10-10 RX ORDER — TRASTUZUMAB-DKST 420 MG/20ML
468 INJECTION, POWDER, LYOPHILIZED, FOR SOLUTION INTRAVENOUS ONCE
Refills: 0 | Status: COMPLETED | OUTPATIENT
Start: 2023-10-10 | End: 2023-10-10

## 2023-10-10 RX ORDER — PITAVASTATIN CALCIUM 4.18 MG/1
4 TABLET, FILM COATED ORAL
Refills: 0 | Status: ACTIVE | COMMUNITY

## 2023-10-10 RX ADMIN — TRASTUZUMAB-DKST 468 MILLIGRAM(S): 420 INJECTION, POWDER, LYOPHILIZED, FOR SOLUTION INTRAVENOUS at 12:57

## 2023-10-10 RX ADMIN — TRASTUZUMAB-DKST 468 MILLIGRAM(S): 420 INJECTION, POWDER, LYOPHILIZED, FOR SOLUTION INTRAVENOUS at 12:27

## 2023-10-10 RX ADMIN — Medication 300 UNIT(S): at 13:00

## 2023-10-10 RX ADMIN — PERTUZUMAB 420 MILLIGRAM(S): 30 INJECTION, SOLUTION, CONCENTRATE INTRAVENOUS at 12:21

## 2023-10-10 RX ADMIN — PERTUZUMAB 420 MILLIGRAM(S): 30 INJECTION, SOLUTION, CONCENTRATE INTRAVENOUS at 11:51

## 2023-10-11 ENCOUNTER — OUTPATIENT (OUTPATIENT)
Dept: OUTPATIENT SERVICES | Facility: HOSPITAL | Age: 48
LOS: 1 days | End: 2023-10-11
Payer: COMMERCIAL

## 2023-10-11 DIAGNOSIS — C50.912 MALIGNANT NEOPLASM OF UNSPECIFIED SITE OF LEFT FEMALE BREAST: ICD-10-CM

## 2023-10-11 PROCEDURE — 93306 TTE W/DOPPLER COMPLETE: CPT | Mod: 26

## 2023-10-11 PROCEDURE — 93306 TTE W/DOPPLER COMPLETE: CPT

## 2023-10-11 PROCEDURE — 93356 MYOCRD STRAIN IMG SPCKL TRCK: CPT | Mod: 26

## 2023-10-16 ENCOUNTER — APPOINTMENT (OUTPATIENT)
Dept: PLASTIC SURGERY | Facility: CLINIC | Age: 48
End: 2023-10-16
Payer: COMMERCIAL

## 2023-10-16 PROCEDURE — 99213 OFFICE O/P EST LOW 20 MIN: CPT | Mod: 95

## 2023-10-30 ENCOUNTER — NON-APPOINTMENT (OUTPATIENT)
Age: 48
End: 2023-10-30

## 2023-10-31 ENCOUNTER — OUTPATIENT (OUTPATIENT)
Dept: OUTPATIENT SERVICES | Facility: HOSPITAL | Age: 48
LOS: 1 days | End: 2023-10-31
Payer: COMMERCIAL

## 2023-10-31 ENCOUNTER — APPOINTMENT (OUTPATIENT)
Dept: INFUSION THERAPY | Facility: CLINIC | Age: 48
End: 2023-10-31

## 2023-10-31 VITALS
RESPIRATION RATE: 18 BRPM | OXYGEN SATURATION: 99 % | TEMPERATURE: 97 F | DIASTOLIC BLOOD PRESSURE: 86 MMHG | SYSTOLIC BLOOD PRESSURE: 132 MMHG | HEART RATE: 69 BPM

## 2023-10-31 VITALS
HEIGHT: 62 IN | WEIGHT: 173.06 LBS | SYSTOLIC BLOOD PRESSURE: 152 MMHG | RESPIRATION RATE: 18 BRPM | TEMPERATURE: 96 F | DIASTOLIC BLOOD PRESSURE: 98 MMHG | HEART RATE: 77 BPM | OXYGEN SATURATION: 98 %

## 2023-10-31 DIAGNOSIS — C50.912 MALIGNANT NEOPLASM OF UNSPECIFIED SITE OF LEFT FEMALE BREAST: ICD-10-CM

## 2023-10-31 PROCEDURE — 96413 CHEMO IV INFUSION 1 HR: CPT

## 2023-10-31 PROCEDURE — 96417 CHEMO IV INFUS EACH ADDL SEQ: CPT

## 2023-10-31 RX ORDER — PERTUZUMAB 30 MG/ML
420 INJECTION, SOLUTION, CONCENTRATE INTRAVENOUS ONCE
Refills: 0 | Status: COMPLETED | OUTPATIENT
Start: 2023-10-31 | End: 2023-10-31

## 2023-10-31 RX ORDER — TRASTUZUMAB-DKST 420 MG/20ML
468 INJECTION, POWDER, LYOPHILIZED, FOR SOLUTION INTRAVENOUS ONCE
Refills: 0 | Status: COMPLETED | OUTPATIENT
Start: 2023-10-31 | End: 2023-10-31

## 2023-10-31 RX ADMIN — TRASTUZUMAB-DKST 468 MILLIGRAM(S): 420 INJECTION, POWDER, LYOPHILIZED, FOR SOLUTION INTRAVENOUS at 13:15

## 2023-10-31 RX ADMIN — PERTUZUMAB 420 MILLIGRAM(S): 30 INJECTION, SOLUTION, CONCENTRATE INTRAVENOUS at 13:50

## 2023-10-31 RX ADMIN — TRASTUZUMAB-DKST 468 MILLIGRAM(S): 420 INJECTION, POWDER, LYOPHILIZED, FOR SOLUTION INTRAVENOUS at 12:41

## 2023-10-31 RX ADMIN — PERTUZUMAB 420 MILLIGRAM(S): 30 INJECTION, SOLUTION, CONCENTRATE INTRAVENOUS at 13:16

## 2023-10-31 RX ADMIN — Medication 300 UNIT(S): at 13:52

## 2023-11-10 ENCOUNTER — APPOINTMENT (OUTPATIENT)
Dept: RADIATION ONCOLOGY | Facility: CLINIC | Age: 48
End: 2023-11-10

## 2023-11-21 ENCOUNTER — NON-APPOINTMENT (OUTPATIENT)
Age: 48
End: 2023-11-21

## 2023-11-21 ENCOUNTER — OUTPATIENT (OUTPATIENT)
Dept: OUTPATIENT SERVICES | Facility: HOSPITAL | Age: 48
LOS: 1 days | End: 2023-11-21
Payer: COMMERCIAL

## 2023-11-21 ENCOUNTER — APPOINTMENT (OUTPATIENT)
Dept: INFUSION THERAPY | Facility: CLINIC | Age: 48
End: 2023-11-21

## 2023-11-21 ENCOUNTER — APPOINTMENT (OUTPATIENT)
Dept: HEMATOLOGY ONCOLOGY | Facility: CLINIC | Age: 48
End: 2023-11-21
Payer: COMMERCIAL

## 2023-11-21 VITALS
TEMPERATURE: 98 F | SYSTOLIC BLOOD PRESSURE: 158 MMHG | HEIGHT: 62 IN | RESPIRATION RATE: 16 BRPM | WEIGHT: 173.06 LBS | DIASTOLIC BLOOD PRESSURE: 90 MMHG | HEART RATE: 69 BPM | OXYGEN SATURATION: 97 %

## 2023-11-21 VITALS
TEMPERATURE: 97.6 F | OXYGEN SATURATION: 96 % | DIASTOLIC BLOOD PRESSURE: 90 MMHG | RESPIRATION RATE: 18 BRPM | HEART RATE: 69 BPM | BODY MASS INDEX: 31.83 KG/M2 | WEIGHT: 173 LBS | SYSTOLIC BLOOD PRESSURE: 158 MMHG | HEIGHT: 62 IN

## 2023-11-21 VITALS
SYSTOLIC BLOOD PRESSURE: 140 MMHG | HEART RATE: 70 BPM | DIASTOLIC BLOOD PRESSURE: 82 MMHG | RESPIRATION RATE: 17 BRPM | OXYGEN SATURATION: 98 % | TEMPERATURE: 98 F

## 2023-11-21 DIAGNOSIS — C50.912 MALIGNANT NEOPLASM OF UNSPECIFIED SITE OF LEFT FEMALE BREAST: ICD-10-CM

## 2023-11-21 PROCEDURE — 96417 CHEMO IV INFUS EACH ADDL SEQ: CPT

## 2023-11-21 PROCEDURE — 99214 OFFICE O/P EST MOD 30 MIN: CPT

## 2023-11-21 PROCEDURE — 96413 CHEMO IV INFUSION 1 HR: CPT

## 2023-11-21 RX ORDER — ELECTROLYTES/DEXTROSE
SOLUTION, ORAL ORAL
Refills: 0 | Status: ACTIVE | COMMUNITY

## 2023-11-21 RX ORDER — MOMETASONE FUROATE 1 MG/G
0.1 CREAM TOPICAL
Qty: 1 | Refills: 2 | Status: DISCONTINUED | COMMUNITY
Start: 2023-09-07 | End: 2023-11-21

## 2023-11-21 RX ADMIN — PERTUZUMAB 420 MILLIGRAM(S): 30 INJECTION, SOLUTION, CONCENTRATE INTRAVENOUS at 14:31

## 2023-11-21 RX ADMIN — Medication 300 UNIT(S): at 15:06

## 2023-11-21 RX ADMIN — TRASTUZUMAB-DKST 468 MILLIGRAM(S): 420 INJECTION, POWDER, LYOPHILIZED, FOR SOLUTION INTRAVENOUS at 15:05

## 2023-11-21 RX ADMIN — TRASTUZUMAB-DKST 468 MILLIGRAM(S): 420 INJECTION, POWDER, LYOPHILIZED, FOR SOLUTION INTRAVENOUS at 14:32

## 2023-11-21 RX ADMIN — PERTUZUMAB 420 MILLIGRAM(S): 30 INJECTION, SOLUTION, CONCENTRATE INTRAVENOUS at 14:01

## 2023-12-12 ENCOUNTER — OUTPATIENT (OUTPATIENT)
Dept: OUTPATIENT SERVICES | Facility: HOSPITAL | Age: 48
LOS: 1 days | End: 2023-12-12
Payer: COMMERCIAL

## 2023-12-12 ENCOUNTER — APPOINTMENT (OUTPATIENT)
Dept: INFUSION THERAPY | Facility: CLINIC | Age: 48
End: 2023-12-12

## 2023-12-12 VITALS
OXYGEN SATURATION: 100 % | DIASTOLIC BLOOD PRESSURE: 90 MMHG | HEART RATE: 70 BPM | WEIGHT: 175.93 LBS | RESPIRATION RATE: 18 BRPM | TEMPERATURE: 98 F | HEIGHT: 62 IN | SYSTOLIC BLOOD PRESSURE: 147 MMHG

## 2023-12-12 VITALS
OXYGEN SATURATION: 99 % | SYSTOLIC BLOOD PRESSURE: 136 MMHG | TEMPERATURE: 98 F | RESPIRATION RATE: 18 BRPM | HEART RATE: 74 BPM | DIASTOLIC BLOOD PRESSURE: 85 MMHG

## 2023-12-12 DIAGNOSIS — C50.912 MALIGNANT NEOPLASM OF UNSPECIFIED SITE OF LEFT FEMALE BREAST: ICD-10-CM

## 2023-12-12 LAB
ISTAT TOTAL BETA HCG, PLASMA: <5 IU/L — SIGNIFICANT CHANGE UP
ISTAT TOTAL BETA HCG, PLASMA: <5 IU/L — SIGNIFICANT CHANGE UP

## 2023-12-12 PROCEDURE — 36415 COLL VENOUS BLD VENIPUNCTURE: CPT

## 2023-12-12 PROCEDURE — 96417 CHEMO IV INFUS EACH ADDL SEQ: CPT

## 2023-12-12 PROCEDURE — 84702 CHORIONIC GONADOTROPIN TEST: CPT

## 2023-12-12 PROCEDURE — 96413 CHEMO IV INFUSION 1 HR: CPT

## 2023-12-12 RX ORDER — TRASTUZUMAB-DKST 420 MG/20ML
468 INJECTION, POWDER, LYOPHILIZED, FOR SOLUTION INTRAVENOUS ONCE
Refills: 0 | Status: COMPLETED | OUTPATIENT
Start: 2023-12-12 | End: 2023-12-12

## 2023-12-12 RX ORDER — PERTUZUMAB 30 MG/ML
420 INJECTION, SOLUTION, CONCENTRATE INTRAVENOUS ONCE
Refills: 0 | Status: COMPLETED | OUTPATIENT
Start: 2023-12-12 | End: 2023-12-12

## 2023-12-12 RX ADMIN — PERTUZUMAB 420 MILLIGRAM(S): 30 INJECTION, SOLUTION, CONCENTRATE INTRAVENOUS at 14:50

## 2023-12-12 RX ADMIN — TRASTUZUMAB-DKST 468 MILLIGRAM(S): 420 INJECTION, POWDER, LYOPHILIZED, FOR SOLUTION INTRAVENOUS at 14:15

## 2023-12-12 RX ADMIN — TRASTUZUMAB-DKST 468 MILLIGRAM(S): 420 INJECTION, POWDER, LYOPHILIZED, FOR SOLUTION INTRAVENOUS at 13:44

## 2023-12-12 RX ADMIN — PERTUZUMAB 420 MILLIGRAM(S): 30 INJECTION, SOLUTION, CONCENTRATE INTRAVENOUS at 14:17

## 2023-12-12 RX ADMIN — Medication 300 UNIT(S): at 14:55

## 2023-12-12 NOTE — DISCHARGE INSTRUCTIONS: CHEMOTHERAPY - NSFACILITYCONTAFTRHOUR_HEME_A_AMB
[facility] ([phone]) [facility] ([phone])

## 2023-12-12 NOTE — DISCHARGE INSTRUCTIONS: CHEMOTHERAPY - NSAPPTSFOLLOWUP_HEME_A_AMB
[facility]... [facility]...

## 2024-01-02 ENCOUNTER — OUTPATIENT (OUTPATIENT)
Dept: OUTPATIENT SERVICES | Facility: HOSPITAL | Age: 49
LOS: 1 days | End: 2024-01-02
Payer: COMMERCIAL

## 2024-01-02 ENCOUNTER — APPOINTMENT (OUTPATIENT)
Dept: INFUSION THERAPY | Facility: CLINIC | Age: 49
End: 2024-01-02

## 2024-01-02 VITALS
SYSTOLIC BLOOD PRESSURE: 150 MMHG | DIASTOLIC BLOOD PRESSURE: 87 MMHG | HEART RATE: 63 BPM | HEIGHT: 62 IN | WEIGHT: 175.05 LBS | RESPIRATION RATE: 16 BRPM | OXYGEN SATURATION: 99 % | TEMPERATURE: 98 F

## 2024-01-02 VITALS
RESPIRATION RATE: 16 BRPM | OXYGEN SATURATION: 100 % | SYSTOLIC BLOOD PRESSURE: 133 MMHG | DIASTOLIC BLOOD PRESSURE: 85 MMHG | HEART RATE: 69 BPM | TEMPERATURE: 98 F

## 2024-01-02 DIAGNOSIS — C50.912 MALIGNANT NEOPLASM OF UNSPECIFIED SITE OF LEFT FEMALE BREAST: ICD-10-CM

## 2024-01-02 PROCEDURE — 96413 CHEMO IV INFUSION 1 HR: CPT

## 2024-01-02 PROCEDURE — 96417 CHEMO IV INFUS EACH ADDL SEQ: CPT

## 2024-01-02 RX ORDER — SODIUM CHLORIDE 9 MG/ML
10 INJECTION INTRAMUSCULAR; INTRAVENOUS; SUBCUTANEOUS ONCE
Refills: 0 | Status: COMPLETED | OUTPATIENT
Start: 2024-01-02 | End: 2024-01-02

## 2024-01-02 RX ORDER — TRASTUZUMAB-DKST 420 MG/20ML
468 INJECTION, POWDER, LYOPHILIZED, FOR SOLUTION INTRAVENOUS ONCE
Refills: 0 | Status: COMPLETED | OUTPATIENT
Start: 2024-01-02 | End: 2024-01-02

## 2024-01-02 RX ORDER — PERTUZUMAB 30 MG/ML
420 INJECTION, SOLUTION, CONCENTRATE INTRAVENOUS ONCE
Refills: 0 | Status: COMPLETED | OUTPATIENT
Start: 2024-01-02 | End: 2024-01-02

## 2024-01-02 RX ADMIN — SODIUM CHLORIDE 10 MILLILITER(S): 9 INJECTION INTRAMUSCULAR; INTRAVENOUS; SUBCUTANEOUS at 15:45

## 2024-01-02 RX ADMIN — PERTUZUMAB 420 MILLIGRAM(S): 30 INJECTION, SOLUTION, CONCENTRATE INTRAVENOUS at 14:31

## 2024-01-02 RX ADMIN — TRASTUZUMAB-DKST 468 MILLIGRAM(S): 420 INJECTION, POWDER, LYOPHILIZED, FOR SOLUTION INTRAVENOUS at 15:10

## 2024-01-02 RX ADMIN — PERTUZUMAB 420 MILLIGRAM(S): 30 INJECTION, SOLUTION, CONCENTRATE INTRAVENOUS at 15:01

## 2024-01-02 RX ADMIN — TRASTUZUMAB-DKST 468 MILLIGRAM(S): 420 INJECTION, POWDER, LYOPHILIZED, FOR SOLUTION INTRAVENOUS at 15:40

## 2024-01-02 NOTE — DISCHARGE INSTRUCTIONS: CHEMOTHERAPY - NSAPPTSTXDETAIL_HEME_A_AMB
daron dennis Island Pedicle Flap With Canthal Suspension Text: The defect edges were debeveled with a #15 scalpel blade.  Given the location of the defect, shape of the defect and the proximity to free margins an island pedicle advancement flap was deemed most appropriate.  Using a sterile surgical marker, an appropriate advancement flap was drawn incorporating the defect, outlining the appropriate donor tissue and placing the expected incisions within the relaxed skin tension lines where possible. The area thus outlined was incised deep to adipose tissue with a #15 scalpel blade.  The skin margins were undermined to an appropriate distance in all directions around the primary defect and laterally outward around the island pedicle utilizing iris scissors.  There was minimal undermining beneath the pedicle flap. A suspension suture was placed in the canthal tendon to prevent tension and prevent ectropion.

## 2024-01-02 NOTE — DISCHARGE INSTRUCTIONS: CHEMOTHERAPY - NSAPPTSFOLLOWUP_HEME_A_AMB
[facility]... [facility]...

## 2024-01-02 NOTE — DISCHARGE INSTRUCTIONS: CHEMOTHERAPY - NSFACILITYCONTAFTRHOUR_HEME_A_AMB
[facility] ([phone]) [facility] ([phone])

## 2024-01-09 ENCOUNTER — OUTPATIENT (OUTPATIENT)
Dept: OUTPATIENT SERVICES | Facility: HOSPITAL | Age: 49
LOS: 1 days | End: 2024-01-09
Payer: COMMERCIAL

## 2024-01-09 DIAGNOSIS — C50.912 MALIGNANT NEOPLASM OF UNSPECIFIED SITE OF LEFT FEMALE BREAST: ICD-10-CM

## 2024-01-09 PROCEDURE — 93306 TTE W/DOPPLER COMPLETE: CPT

## 2024-01-09 PROCEDURE — 93306 TTE W/DOPPLER COMPLETE: CPT | Mod: 26

## 2024-01-16 ENCOUNTER — APPOINTMENT (OUTPATIENT)
Dept: MAMMOGRAPHY | Facility: HOSPITAL | Age: 49
End: 2024-01-16
Payer: COMMERCIAL

## 2024-01-16 ENCOUNTER — OUTPATIENT (OUTPATIENT)
Dept: OUTPATIENT SERVICES | Facility: HOSPITAL | Age: 49
LOS: 1 days | End: 2024-01-16
Payer: COMMERCIAL

## 2024-01-16 ENCOUNTER — RESULT REVIEW (OUTPATIENT)
Age: 49
End: 2024-01-16

## 2024-01-16 ENCOUNTER — APPOINTMENT (OUTPATIENT)
Dept: ULTRASOUND IMAGING | Facility: HOSPITAL | Age: 49
End: 2024-01-16
Payer: COMMERCIAL

## 2024-01-16 PROCEDURE — 77066 DX MAMMO INCL CAD BI: CPT | Mod: 26

## 2024-01-16 PROCEDURE — 77066 DX MAMMO INCL CAD BI: CPT

## 2024-01-16 PROCEDURE — 76641 ULTRASOUND BREAST COMPLETE: CPT | Mod: 26,50

## 2024-01-16 PROCEDURE — 77062 BREAST TOMOSYNTHESIS BI: CPT | Mod: 26

## 2024-01-16 PROCEDURE — G0279: CPT

## 2024-01-16 PROCEDURE — 76641 ULTRASOUND BREAST COMPLETE: CPT

## 2024-01-18 RX ORDER — SODIUM CHLORIDE 9 MG/ML
10 INJECTION INTRAMUSCULAR; INTRAVENOUS; SUBCUTANEOUS ONCE
Refills: 0 | Status: COMPLETED | OUTPATIENT
Start: 2024-01-22 | End: 2024-01-22

## 2024-01-18 RX ORDER — PERTUZUMAB 30 MG/ML
420 INJECTION, SOLUTION, CONCENTRATE INTRAVENOUS ONCE
Refills: 0 | Status: COMPLETED | OUTPATIENT
Start: 2024-01-22 | End: 2024-01-22

## 2024-01-22 ENCOUNTER — LABORATORY RESULT (OUTPATIENT)
Age: 49
End: 2024-01-22

## 2024-01-22 ENCOUNTER — APPOINTMENT (OUTPATIENT)
Dept: HEMATOLOGY ONCOLOGY | Facility: CLINIC | Age: 49
End: 2024-01-22
Payer: COMMERCIAL

## 2024-01-22 ENCOUNTER — OUTPATIENT (OUTPATIENT)
Dept: OUTPATIENT SERVICES | Facility: HOSPITAL | Age: 49
LOS: 1 days | End: 2024-01-22
Payer: COMMERCIAL

## 2024-01-22 ENCOUNTER — APPOINTMENT (OUTPATIENT)
Dept: INFUSION THERAPY | Facility: CLINIC | Age: 49
End: 2024-01-22

## 2024-01-22 VITALS
TEMPERATURE: 96 F | BODY MASS INDEX: 32.2 KG/M2 | HEIGHT: 62 IN | OXYGEN SATURATION: 97 % | RESPIRATION RATE: 18 BRPM | DIASTOLIC BLOOD PRESSURE: 85 MMHG | WEIGHT: 175 LBS | SYSTOLIC BLOOD PRESSURE: 147 MMHG | HEART RATE: 69 BPM

## 2024-01-22 VITALS
OXYGEN SATURATION: 97 % | HEART RATE: 69 BPM | WEIGHT: 175.05 LBS | HEIGHT: 62 IN | TEMPERATURE: 96 F | SYSTOLIC BLOOD PRESSURE: 147 MMHG | DIASTOLIC BLOOD PRESSURE: 85 MMHG | RESPIRATION RATE: 18 BRPM

## 2024-01-22 VITALS
OXYGEN SATURATION: 100 % | DIASTOLIC BLOOD PRESSURE: 78 MMHG | RESPIRATION RATE: 18 BRPM | SYSTOLIC BLOOD PRESSURE: 120 MMHG | TEMPERATURE: 98 F | HEART RATE: 68 BPM

## 2024-01-22 DIAGNOSIS — C50.912 MALIGNANT NEOPLASM OF UNSPECIFIED SITE OF LEFT FEMALE BREAST: ICD-10-CM

## 2024-01-22 LAB
ALBUMIN SERPL ELPH-MCNC: 3.6 G/DL
ALP BLD-CCNC: 85 U/L
ALT SERPL-CCNC: 14 U/L
ANION GAP SERPL CALC-SCNC: -2 MMOL/L
AST SERPL-CCNC: 21 U/L
BILIRUB SERPL-MCNC: 0.7 MG/DL
BUN SERPL-MCNC: 8 MG/DL
CALCIUM SERPL-MCNC: 9.3 MG/DL
CHLORIDE SERPL-SCNC: 109 MMOL/L
CO2 SERPL-SCNC: 31 MMOL/L
CREAT SERPL-MCNC: 0.8 MG/DL
EGFR: 92 ML/MIN/1.73M2
GLUCOSE SERPL-MCNC: 112 MG/DL
INR PPP: 1.05
POTASSIUM SERPL-SCNC: 3.8 MMOL/L
PROT SERPL-MCNC: 7.4 G/DL
PT BLD: 11.9 SEC
SODIUM SERPL-SCNC: 138 MMOL/L

## 2024-01-22 PROCEDURE — 99214 OFFICE O/P EST MOD 30 MIN: CPT

## 2024-01-22 PROCEDURE — 96413 CHEMO IV INFUSION 1 HR: CPT

## 2024-01-22 PROCEDURE — 96417 CHEMO IV INFUS EACH ADDL SEQ: CPT

## 2024-01-22 RX ORDER — TRASTUZUMAB-DKST 420 MG/20ML
468 INJECTION, POWDER, LYOPHILIZED, FOR SOLUTION INTRAVENOUS ONCE
Refills: 0 | Status: COMPLETED | OUTPATIENT
Start: 2024-01-22 | End: 2024-01-22

## 2024-01-22 RX ADMIN — TRASTUZUMAB-DKST 468 MILLIGRAM(S): 420 INJECTION, POWDER, LYOPHILIZED, FOR SOLUTION INTRAVENOUS at 15:13

## 2024-01-22 RX ADMIN — PERTUZUMAB 420 MILLIGRAM(S): 30 INJECTION, SOLUTION, CONCENTRATE INTRAVENOUS at 14:46

## 2024-01-22 RX ADMIN — PERTUZUMAB 420 MILLIGRAM(S): 30 INJECTION, SOLUTION, CONCENTRATE INTRAVENOUS at 15:11

## 2024-01-22 RX ADMIN — SODIUM CHLORIDE 10 MILLILITER(S): 9 INJECTION INTRAMUSCULAR; INTRAVENOUS; SUBCUTANEOUS at 15:55

## 2024-01-22 RX ADMIN — TRASTUZUMAB-DKST 468 MILLIGRAM(S): 420 INJECTION, POWDER, LYOPHILIZED, FOR SOLUTION INTRAVENOUS at 15:45

## 2024-01-22 NOTE — HISTORY OF PRESENT ILLNESS
[Disease: _____________________] : Disease: [unfilled] [T: ___] : T[unfilled] [N: ___] : N[unfilled] [M: ___] : M[unfilled] [AJCC Stage: ____] : AJCC Stage: [unfilled] [Treatment Protocol] : Treatment Protocol [de-identified] : 49 y/o female with h/o left breast cancer s/p neoadjuvant TCHP, 5/19/23 left lumpectomy/SLNB, local tissue rearrangement and contralateral reduction.  She completed adjuvant RT (8/28/23-9/21/23). She is on adjuvant Kanjinti/pertuzumab.  She returns for f/u and tx.   PMHx:  HTN, multinodular goiter, followed by ENT, had 5 biopsies that were benign, last thyroid US last year.  Denies any compressive symptoms, such as dysphagia or dyspnea.    Patient initially experienced R breast heaviness which she describes as similar to menstrual symptoms in November 2022 prompting diagnostic workup that detected a left breast cancer, invasive ductal carcinoma, ER/MO (-) HER-2 (+) with metastasis to lymph node. Left breast and axillary lymph node were both biopsied on 12/9/22.   Denies family history of breast or ovarian CA. Denies any history of breast surgeries or biopsies.  12/2/22 B/L dxMMG/US ([facility]): The left breast 0.6 x 0.5 x 0.5 cm indistinct irregular hypoechoic mass at 10:00 14 CMFN corresponds with mammography and is suspicious for malignancy. The 0.9 x 0.8 x 0.7 cm irregular complex cystic mass at 2:00 4 CMFN is indeterminate. The 1.8 x 1.2 cm abnormally thickened lymph node at 2:00 16 CMFN is suspicious for malignancy. No mammographic or sonographic evidence of malignancy in the right breast. BIRADS 5.  12/9/22 biopsy x 3: A. Left breast 2:00 4cmFN core biopsy: radial scar with calcifications, columnar cell change, cystic apocrine metaplasia. B. Left breast 2:00 16cm FN core biopsy: cortically thickened lymph node in the left axilla, metastatic carcinoma morphologically similar to part C. C. Left breast 10:00 14cm FN core biopsy: invasive moderately to poorly differentiated ductal carcinoma with micropapillary features, spanning at least 7 mm in this material.  Adjacent benign breast tissue with fibrocystic changes.  ER-, MO-, HER2+   12/30/22 MRI:  1. Left breast cancer 10:00 (14N) (buckle clip), newly diagnosed cancer.  Measures about 6 mm. 2. Left breast, 12:00 (15N) (near chest wall), there is an irregular 32 x 12 x 14 mm mass corresponding to the area of distortion seen on the recent mammogram.  There is no clip in this mass.  Recommend second look ultrasound and biopsy.  If not seen on ultrasound, recommend stereotactic biopsy. 3. Left 2:00 position, 4cm from  nipple (top hat shaped clip) there is an irregular 5 mm mass corresponding to the biopsy showing radial scar. 4. Left axillary adenopathy.  Corresponds to biopsy (cork clip) showing metastatic cancer.  1/6/23 PETCT:  1. Multiple hypermetabolic left breast lesions and hypermetabolic left axillary lymphadenopathy, which is consistent with breast malignancy with local colton spread.  No evidence of distant FDG avid metastatic disease. 2. Diffuse left-sided asymmetric enlargement of the thyroid gland with heterogeneous attenuation, typical of multinodular goiter.  There is also slight rightward tracheal deviation due to mass effect without tracheal narrowing.  1/10/23 ECHO: LVEF 65-70%  1/10/23 L breast US: Left breast 12:00 14 cm from the nipple, there is an irregular 33 x 7 x 15 mm mass corresponding to MRI finding. US guided L breast core biopsy: Invasive ductal carcinoma, poorly-differentiated, DCIS. Concordant malignant.  2/2/23 port placement.  4/17/23 MRI breast: near complete imaging response to neoadjuvant chemotherapy.  Previously noted masses are markedly smaller with slight residual areas of enhancement around the clips.  These may represent post-chemo effect, clip artifact or some residual disease.  There is no internal mammary adenopathy.  Previously enlarged left axillary lymph nodes are smaller than noted on 12/30/22 MRI.  The largest lymph node now measures about 1.6 cm (previously 3 cm) and does not contain a clip.  The previously biopsied lymph node is no longer seen.  4/18/23 ECHO 1. Normal left ventricular size and systolic function 2. Left ventricular global longitudinal strain was -20.90% 3. Normal right ventricular size and systolic function 4. Mild tricuspid regurgitation 5. No evidence of pulmonary hypertension, pulmonary artery systolic pressure is 29 mmHg 6. No pericardial effusion 7. Compared to the previous TTE performed on 1/10/23, there have been no changes  5/19/23 left lumpectomy/SLNB: No invasive carcinoma seen.  DCIS present within the tumor bed area. 0/7 LNs.  ypTis ypN0  7/18/23 ECHO: LVEF 60-65%  10/11/23 ECHO: LVEF 60-65%  1/9/24 ECHO: LVEF 65%  1/16/24: b/l MG/US: No mammographic or sonographic evidence of malignancy  [de-identified] : moderately to poorly invasive ductal carcinoma [de-identified] : ER-, MN- HER2+  [de-identified] : 12/27/22 ([facility]) 47 HBOC gene panel: negative for pathogenic mutations [FreeTextEntry1] : Weekly paclitaxel x 12 (1/23/23-4/11/23) Weekly carboplatin x 6 (1/23/23- 2/28/23); discontinued due to neutropenia. Kanjinti/pertuzumab every 3 weeks (1/23/23-5/9/23, 6/6/23-1/22/24) [de-identified] : She present for her last infusion of  Kanjinti/pertuzumab.  She has been tolerating it well.

## 2024-01-23 ENCOUNTER — NON-APPOINTMENT (OUTPATIENT)
Age: 49
End: 2024-01-23

## 2024-01-23 LAB — APTT BLD: 36.7 SEC

## 2024-01-30 ENCOUNTER — APPOINTMENT (OUTPATIENT)
Dept: INTERVENTIONAL RADIOLOGY/VASCULAR | Facility: HOSPITAL | Age: 49
End: 2024-01-30

## 2024-01-30 ENCOUNTER — RESULT REVIEW (OUTPATIENT)
Age: 49
End: 2024-01-30

## 2024-01-30 ENCOUNTER — OUTPATIENT (OUTPATIENT)
Dept: OUTPATIENT SERVICES | Facility: HOSPITAL | Age: 49
LOS: 1 days | End: 2024-01-30
Payer: COMMERCIAL

## 2024-01-30 PROCEDURE — 77001 FLUOROGUIDE FOR VEIN DEVICE: CPT | Mod: 26

## 2024-01-30 PROCEDURE — 77001 FLUOROGUIDE FOR VEIN DEVICE: CPT

## 2024-01-30 PROCEDURE — 99152 MOD SED SAME PHYS/QHP 5/>YRS: CPT

## 2024-01-30 PROCEDURE — 36590 REMOVAL TUNNELED CV CATH: CPT

## 2024-01-31 ENCOUNTER — NON-APPOINTMENT (OUTPATIENT)
Age: 49
End: 2024-01-31

## 2024-02-13 ENCOUNTER — APPOINTMENT (OUTPATIENT)
Dept: RADIATION ONCOLOGY | Facility: CLINIC | Age: 49
End: 2024-02-13
Payer: COMMERCIAL

## 2024-02-13 PROCEDURE — 99024 POSTOP FOLLOW-UP VISIT: CPT

## 2024-02-13 RX ORDER — BIOTIN 10 MG
TABLET ORAL
Refills: 0 | Status: COMPLETED | COMMUNITY
End: 2024-02-13

## 2024-02-13 RX ORDER — TRASTUZUMAB-ANNS 150 MG/7.15ML
INJECTION, POWDER, LYOPHILIZED, FOR SOLUTION INTRAVENOUS
Refills: 0 | Status: COMPLETED | COMMUNITY
End: 2024-02-13

## 2024-02-13 RX ORDER — AMLODIPINE BESYLATE 5 MG/1
5 TABLET ORAL DAILY
Refills: 0 | Status: ACTIVE | COMMUNITY

## 2024-02-13 RX ORDER — PERTUZUMAB 30 MG/ML
INJECTION, SOLUTION, CONCENTRATE INTRAVENOUS
Refills: 0 | Status: COMPLETED | COMMUNITY
End: 2024-02-13

## 2024-02-13 NOTE — REASON FOR VISIT
[Post-Treatment Evaluation] : post-treatment evaluation for [Home] : at home, [unfilled] , at the time of the visit. [Medical Office: ([facility])___] : at the medical office located in  [Patient] : the patient

## 2024-02-20 NOTE — DISEASE MANAGEMENT
[TTNM] : 2 [NTNM] : 1 [MTNM] : X [de-identified] : 9220jVt [de-identified] : 9116vFe [de-identified] : Left breast/chest wall/nodes

## 2024-02-20 NOTE — PHYSICAL EXAM
[Normal] : well developed, well nourished, in no acute distress [de-identified] : Mild hyperpigmentation. No concerning masses or skin changes b/l

## 2024-02-20 NOTE — HISTORY OF PRESENT ILLNESS
[FreeTextEntry1] : 46 y/o F PMH multinodular goiter and now LEFT ER-/ND-/HER2+ clinical prognostic stage IIB (yA3J3V5; ypTisN0) IDC s/p NA TCHP and BCS w/ SLNBx (no residual invasive disease, G2-3, DCIS+, margin-, 0/7 LNs), with total of 1 year of HP planned adjuvantly, who completed radiation therapy 9/21/23 total of 4240cGy to the Left breast.   2/13/24: PTE Ms. Joseph reports feeling well. She states she has full energy, and her appetite is good. Skin is good, she stopped using OTC cream/lotion shortly after her treatment ended. ROM is good. She has some breast tenderness at the surgical site, but not enough to require medication.  9/15/2023: FINAL OTV 3180cGy/4240cGy to L breast. Today the patient reports feeling well overall. Denies pain, fatigue, or skin concerns. Using Aquaphor BID. Notes improvement in RUE pain with use of Ibuprofen PRN. Continue RT as prescribed.  9/8/2023: OTV 1855cGy/4240cGy to L breast/CW/nodes. Reports feeling fine on treated side - denies fatigue, appreciable breast symptoms; skin is clear with faint erythema (pt plans to  Rx cream today). Endorses pain in RUE that has been "excruciating" for the past week. She notes the pain radiates from bicep area down forearm and that she is taking Tylenol with mild relief. Of note, pt states she has history of cervical disc issue.  9/1/2023: OTV 795cGy/4240cGy to L breast/CW/nodes   6/6/2023: Consultation Reports feeling well overall. Notes a tugging sensation at post-op breast with overuse of the upper extremities. Of note, patient works in [employer] and [facility] in [place], [place].    History of Present Illness: ________________________________  11/2022 Patient initially experienced R breast heaviness which she describes as similar to menstrual symptoms in November 2022 prompting diagnostic workup that detected a left breast cancer, invasive ductal carcinoma, ER/ND (-) HER-2 (+) with metastasis to lymph node. Left breast and axillary lymph node were both biopsied on 12/9/22.  12/2/22 B/L dxMMG/US ([facility]): The left breast 0.6 x 0.5 x 0.5 cm indistinct irregular hypoechoic mass at 10:00 14 CMFN corresponds with mammography and is suspicious for malignancy. Left breast ultrasound-guided core biopsy with microclip placement and postprocedural mammographic correlation is recommended. The 0.9 x 0.8 x 0.7 cm irregular complex cystic mass at 2:00 4 CMFN is indeterminate. Left breast ultrasound-guided core biopsy with microclip placement and postprocedural mammographic correlation is recommended. The 1.8 x 1.2 cm abnormally thickened lymph node at 2:00 16 CMFN is suspicious for malignancy. Left breast ultrasound-guided core biopsy with microclip placement and postprocedural mammographic correlation is recommended. No mammographic or sonographic evidence of malignancy in the right breast. BIRADS 5.  12/27/22 ([facility]) 47 HBOC gene panel: negative for pathogenic mutations.   12/30/22 ([facility]) B/L MRI:  1. Left breast cancer 10:00 14cmfn (buckle clip) , newly diagnosed cancer. Measures about 0.6 cm. 2. Left breast, 12:00 15cmfn (near chest wall), there is an irregular 3.2 x 1.2 x 1.4 cm mass corresponding to the area of distortion seen on the recent mammogram. There is no clip in this mass. Recommend second look ultrasound and biopsy. If not seen on ultrasound, recommend stereotactic biopsy.  3. Left 2:00 position 4cmfn (top-hat shaped clip) there is an irregular 0.5 cm mass corresponding to the biopsy showing radial scar. 4. Left axillary adenopathy. Corresponds to biopsy (cork clip) showing metastatic cancer. RECOMMENDATION: Ultrasound biopsy. BI-RADS 4C- Suspicious Finding(s) - High Suspicion for Malignancy   1/6/23 PETCT:  1. Multiple hypermetabolic left breast lesions and hypermetabolic left axillary lymphadenopathy, which is consistent with breast malignancy with local colton spread. No evidence of distant FDG avid metastatic disease. 2. Diffuse left-sided asymmetric enlargement of the thyroid gland with heterogeneous attenuation, typical of multinodular goiter. There is also slight rightward tracheal deviation due to mass effect without tracheal narrowing.  1/10/23 L Targeted US ([facility]): LEFT breast 12:00 14 cm from the nipple, there is an irregular 33 x 7 x 15 mm mass corresponding to the MRI finding. This was targeted for subsequent ultrasound biopsy performed today. BIRADS 4.  1/10/23 L US Guided Biopsy ([facility]): L 12:00 14cmFN pathology shows Invasive ductal carcinoma, poorly-differentiated, DCIS. Concordant malignant.  2/2/23 L Posterior Cervical LN FNA ([facility]): NEGATIVE FOR MALIGNANT CELLS. Heterogeneous population of lymphocytes. No epithelial cells visualized. Cell block, noncontributory. Note: Flow cytometry will be reported in an addendum.   4/17/23 B/L MRI ([facility]): LEFT BREAST: 3 clips noted in the left breast and one in the left axilla (4 total clips). 1. Biopsy proven metastasis to a left axillary lymph node (cork clip) 2. Biopsy proven left IDC 10:00 (14N)(buckle clip) 3. Biopsy proven radial scar left 2:00 (4N)(top hat) 4. Biopsy left 12:00(15N)(ribbon) = IDC  Previously noted masses are markedly smaller, with slight residual areas of enhancement around the clips. These may represent post-chemo effect, clip artifact or some residual disease.  5/19/23 L Lumpectomy and Targeted LN/SLNB Pathology: No invasive carcinoma is seen. 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 DCIS is 5 mm form the posterior margin. Prior procedure site changes (x2). Margins negative. (0/7) LN negative.  Systemic therapy: Paclitaxel x 12 (1/23/23-4/11/23) Weekly carboplatin x 6 (1/23/23- 2/28/23); discontinued due to neutropenia. Kanjinti/pertuzumab every 3 weeks (1/23/23-5/9/23) Trastuzumab and Pertuzumab to (planned for 1 year), starting (6/6/23 - presents)

## 2024-02-20 NOTE — VITALS
[NoTreatment Scheduled] : no treatment scheduled [Maximal Pain Intensity: 0/10] : 0/10 [Least Pain Intensity: 0/10] : 0/10 [100: Normal, no complaints, no evidence of disease.] : 100: Normal, no complaints, no evidence of disease. [ECOG Performance Status: 0 - Fully active, able to carry on all pre-disease performance without restriction] : Performance Status: 0 - Fully active, able to carry on all pre-disease performance without restriction

## 2024-02-27 ENCOUNTER — NON-APPOINTMENT (OUTPATIENT)
Age: 49
End: 2024-02-27

## 2024-04-06 ENCOUNTER — NON-APPOINTMENT (OUTPATIENT)
Age: 49
End: 2024-04-06

## 2024-04-23 ENCOUNTER — APPOINTMENT (OUTPATIENT)
Dept: GASTROENTEROLOGY | Facility: CLINIC | Age: 49
End: 2024-04-23
Payer: COMMERCIAL

## 2024-04-23 VITALS
WEIGHT: 179 LBS | DIASTOLIC BLOOD PRESSURE: 80 MMHG | SYSTOLIC BLOOD PRESSURE: 130 MMHG | HEIGHT: 62 IN | OXYGEN SATURATION: 98 % | BODY MASS INDEX: 32.94 KG/M2 | TEMPERATURE: 95.6 F | RESPIRATION RATE: 14 BRPM | HEART RATE: 69 BPM

## 2024-04-23 DIAGNOSIS — Z12.11 ENCOUNTER FOR SCREENING FOR MALIGNANT NEOPLASM OF COLON: ICD-10-CM

## 2024-04-23 PROCEDURE — 99204 OFFICE O/P NEW MOD 45 MIN: CPT

## 2024-04-23 RX ORDER — SODIUM SULFATE, POTASSIUM SULFATE AND MAGNESIUM SULFATE 1.6; 3.13; 17.5 G/177ML; G/177ML; G/177ML
17.5-3.13-1.6 SOLUTION ORAL
Qty: 1 | Refills: 0 | Status: ACTIVE | COMMUNITY
Start: 2024-04-23 | End: 1900-01-01

## 2024-04-23 NOTE — END OF VISIT
[] : Fellow [FreeTextEntry3] : Pt seen and d/w fellow.  Colonoscopy at [facility].  Suprep split prep.

## 2024-04-23 NOTE — HISTORY OF PRESENT ILLNESS
[FreeTextEntry1] : 48F with pmh breast cancer, htn, hld presenting for first time colon cancer screening.  Overall feels well today without any GI complaints.  Denies diarrhea, constipation, rectal bleeding, melena, odynophagia, dysphagia, heart burn, epigastric/ abdominal pain, n/v, unintentional weight loss  No prior EGD or colonoscopy  No family Hx of GI malignancy or other malignancy in FDR  Meds: statin, amlodipine AC: denies NSAIDS: denies ETOH: denies TOB: denies Drug use: denies

## 2024-04-23 NOTE — ASSESSMENT
[FreeTextEntry1] : 48F with pmh breast cancer, htn, hld presenting for first time colon cancer screening.  #Colon cancer screening Average risk, index colonoscopy - Plan for colonoscopy @ [facility] with Suprep - Details of procedure, including risks of anesthesia and procedure which include but not limited to bleeding, perforation, infection, missed polyp discussed and patient gives verbal consent. Written consent to be obtained at time of procedure. - Prep instructions discussed at length - Pt advised an escort is needed to  from procedure

## 2024-04-23 NOTE — PHYSICAL EXAM
[Alert] : alert [Well Developed] : well developed [Normal] : the sclera and conjunctiva were normal [Hearing Threshold Finger Rub Not [place]] : hearing was normal [Normal Appearance] : the appearance of the neck was normal [No Respiratory Distress] : no respiratory distress [Oriented To Time, Place, And Person] : oriented to person, place, and time

## 2024-06-17 PROBLEM — C50.912 INVASIVE DUCTAL CARCINOMA OF BREAST, LEFT: Status: ACTIVE | Noted: 2022-12-27

## 2024-06-17 PROBLEM — Z08 ENCOUNTER FOR FOLLOW-UP SURVEILLANCE OF BREAST CANCER: Status: ACTIVE | Noted: 2024-06-17

## 2024-06-18 ENCOUNTER — APPOINTMENT (OUTPATIENT)
Dept: BREAST CENTER | Facility: CLINIC | Age: 49
End: 2024-06-18
Payer: COMMERCIAL

## 2024-06-18 ENCOUNTER — NON-APPOINTMENT (OUTPATIENT)
Age: 49
End: 2024-06-18

## 2024-06-18 VITALS
BODY MASS INDEX: 32.94 KG/M2 | WEIGHT: 179 LBS | SYSTOLIC BLOOD PRESSURE: 142 MMHG | DIASTOLIC BLOOD PRESSURE: 85 MMHG | HEIGHT: 62 IN | HEART RATE: 66 BPM

## 2024-06-18 DIAGNOSIS — Z85.3 ENCOUNTER FOR FOLLOW-UP EXAMINATION AFTER COMPLETED TREATMENT FOR MALIGNANT NEOPLASM: ICD-10-CM

## 2024-06-18 DIAGNOSIS — Z08 ENCOUNTER FOR FOLLOW-UP EXAMINATION AFTER COMPLETED TREATMENT FOR MALIGNANT NEOPLASM: ICD-10-CM

## 2024-06-18 DIAGNOSIS — C50.912 MALIGNANT NEOPLASM OF UNSPECIFIED SITE OF LEFT FEMALE BREAST: ICD-10-CM

## 2024-06-18 PROCEDURE — 93702 BIS XTRACELL FLUID ANALYSIS: CPT | Mod: NC

## 2024-06-18 PROCEDURE — 99213 OFFICE O/P EST LOW 20 MIN: CPT

## 2024-06-18 NOTE — DATA REVIEWED
[FreeTextEntry1] : 12/2/22 B/L dxMMG/US ([facility]): The left breast 0.6 x 0.5 x 0.5 cm indistinct irregular hypoechoic mass at 10:00 14 CMFN corresponds with mammography and is suspicious for malignancy. Left breast ultrasound-guided core biopsy with microclip placement and postprocedural mammographic correlation is recommended. The 0.9 x 0.8 x 0.7 cm irregular complex cystic mass at 2:00 4 CMFN is indeterminate. Left breast ultrasound-guided core biopsy with microclip placement and postprocedural mammographic correlation is recommended. The 1.8 x 1.2 cm abnormally thickened lymph node at 2:00 16 CMFN is suspicious for malignancy. Left breast ultrasound-guided core biopsy with microclip placement and postprocedural mammographic correlation is recommended. No mammographic or sonographic evidence of malignancy in the right breast. BIRADS 5.  12/27/22 ([facility]) 47 HBOC gene panel: negative for pathogenic mutations.   12/30/22 ([facility]) B/L MRI:  1. Left breast cancer 10:00 14cmfn (buckle clip) , newly diagnosed cancer. Measures about 0.6 cm. 2. Left breast, 12:00 15cmfn (near chest wall), there is an irregular 3.2 x 1.2 x 1.4 cm mass corresponding to the area of distortion seen on the recent mammogram. There is no clip in this mass. Recommend second look ultrasound and biopsy. If not seen on ultrasound, recommend stereotactic biopsy.  3. Left 2:00 position 4cmfn (top-hat shaped clip) there is an irregular 0.5 cm mass corresponding to the biopsy showing radial scar. 4. Left axillary adenopathy. Corresponds to biopsy (cork clip) showing metastatic cancer. RECOMMENDATION: Ultrasound biopsy. BI-RADS 4C- Suspicious Finding(s) - High Suspicion for Malignancy   1/10/23 L Targeted US ([facility]): LEFT breast 12:00 14 cm from the nipple, there is an irregular 33 x 7 x 15 mm mass corresponding to the MRI finding. This was targeted for subsequent ultrasound biopsy performed today. BIRADS 4.  1/10/23 L US Guided Biopsy ([facility]): L 12:00 14cmFN pathology shows Invasive ductal carcinoma, poorly-differentiated, DCIS. Concordant malignant.  2/2/23 L Posterior Cervical LN FNA ([facility]): NEGATIVE FOR MALIGNANT CELLS. Heterogeneous population of lymphocytes. No epithelial cells visualized. Cell block, noncontributory. Note: Flow cytometry will be reported in an addendum.   4/17/23 B/L MRI ([facility]): LEFT BREAST: 3 clips noted in the left breast and one in the left axilla (4 total clips). 1. Biopsy proven metastasis to a left axillary lymph node (cork clip) 2. Biopsy proven left IDC 10:00 (14N)(buckle clip) 3. Biopsy proven radial scar left 2:00 (4N)(top hat) 4. Biopsy left 12:00(15N)(ribbon) = IDC  Previously noted masses are markedly smaller, with slight residual areas of enhancement around the clips.  These may represent post-chemo effect, clip artifact or some residual disease.  5/19/23 L Lumpectomy and Targeted LN/SLNB Pathology: No invasive carcinoma is seen. 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 DCIS is 5 mm form the posterior margin. Prior procedure site changes (x2). Margins negative. (0/7) LN negative.  1/16/24 ([facility]) B/L DX MMG/US: heterogeneously dense. Follow Up: Annual screening. BIRADS-2, benign.

## 2024-06-18 NOTE — ASSESSMENT
[FreeTextEntry1] : 49 yo F presents for follow up w/ h/o neoadjuvant TCHP followed by L double localized lumpectomy, targeted L axillary node excision, SLNB, and frozen section with segmental excision with breast conservation and reconstruction with breast tissue rearrangement and contralateral breast reduction for symmetry (Dr. Dec) 5/19/23 for LEFT IDC ER/MD (-) HER-2 (+) with metastasis to lymph node seen on diagnostic mammogram as 0.6 cm irregular hypoechoic mass at LEFT 10n14 and 1.8 cm abnormal lymph node. Final surgical pathology revealed no invasive carcinoma. DCIS present within the tumor bed area. Prior procedure site changes x 2. Margins negative. (0/7) lymph nodes negative for carcinoma.   [facility] 47 gene panel negative. S/p XRT 9/21/23 (Dr. Rubio). S/p adjuvant Kanjinti/pertuzumab 1/22/24 (Dr. Chavis).   Sozo measurement obtained in office today, WNL. Imaging reviewed; B/L DX MMG/US 1/16/24 BIRADS-2. Initially planned for patient to have a B/L MRI prior to this office visit, which has not yet been completed. Plan for B/L MRI now and, if benign, proceed with B/L DX MMG/US, re-examination and repeat sozo measurement in Dec 2024. Patient verbalizes understanding and is in agreement with the plan.

## 2024-06-18 NOTE — HISTORY OF PRESENT ILLNESS
[FreeTextEntry1] : 49 yo F presents for follow up w/ h/o neoadjuvant TCHP followed by L double localized lumpectomy, targeted L axillary node excision, SLNB, and frozen section with segmental excision with breast conservation and reconstruction with breast tissue rearrangement and contralateral breast reduction for symmetry (Dr. Dec) 5/19/23 for LEFT IDC ER/AR (-) HER-2 (+) with metastasis to lymph node seen on diagnostic mammogram as 0.6 cm irregular hypoechoic mass at LEFT 10n14 and 1.8 cm abnormal lymph node both biopsied on 12/9/22. Patient initially experienced R breast symptoms which she describes as similar to menstrual symptoms in November 2022 prompting diagnostic workup that detected L breast cancer. Of note, third biopsy also performed LEFT 2n4 showing radial scar with calcifications, also excised. Pre-op, post-NAC B/L MRI performed 4/12/23 showed near complete imaging response to therapy.    Final surgical pathology revealed no invasive carcinoma. DCIS present within the tumor bed area. Prior procedure site changes x 2. Margins negative. (0/7) lymph nodes negative for carcinoma.   S/p XRT 9/21/23 (Dr. Rubio). S/p adjuvant Kanjinti/pertuzumab 1/22/24 (Dr. Chavis).   B/L DX MMG/US 1/16/24 BIRADS-2. Planned for a B/L MRI in June 2024 (prior to this office visit) which has not yet been completed.   Of note, left posterior cervical lymphadenopathy noted on physical exam during first office visit, FNA performed Feb 2023 negative for malignant cells.  3 clips noted in the left breast and one in the left axilla (4 total clips). 1. Biopsy proven metastasis to a left axillary lymph node (cork clip) 2. Biopsy proven left IDC 10:00 (14N)(buckle clip) 3. Biopsy proven radial scar left 2:00 (4N)(top hat) 4. Biopsy left 12:00(15N)(ribbon) = IDC  Patient had some right arm/axillary pain (side opposite of cancer).  Seen in ER at [facility] and worked up for thrombosis.  None found on doppler.  This is the same side as her life port.  The pain was persistent.  Denies family history of breast or ovarian CA. [facility] 47 gene panel negative.  TNM Stage: p T 2 N 1 M X AJCC Stage (8th Ed): IIB.

## 2024-06-18 NOTE — PHYSICAL EXAM
[No Supraclavicular Adenopathy] : no supraclavicular adenopathy [Examined in the supine and seated position] : examined in the supine and seated position [Symmetrical] : symmetrical [No dominant masses] : no dominant masses in right breast  [No Nipple Retraction] : no left nipple retraction [No Nipple Discharge] : no left nipple discharge [No Axillary Lymphadenopathy] : no right axillary lymphadenopathy [No Edema] : no edema [No Swelling] : no swelling [Full ROM] : full range of motion [de-identified] : Port placed [de-identified] : Bilateral periareolar breast reconstruction incisions C/D/I [de-identified] : Transverse incision C/D/I

## 2024-06-25 ENCOUNTER — APPOINTMENT (OUTPATIENT)
Age: 49
End: 2024-06-25
Payer: COMMERCIAL

## 2024-06-25 PROCEDURE — 45378 DIAGNOSTIC COLONOSCOPY: CPT

## 2024-07-08 ENCOUNTER — APPOINTMENT (OUTPATIENT)
Dept: HEMATOLOGY ONCOLOGY | Facility: CLINIC | Age: 49
End: 2024-07-08

## 2024-07-11 ENCOUNTER — OUTPATIENT (OUTPATIENT)
Dept: OUTPATIENT SERVICES | Facility: HOSPITAL | Age: 49
LOS: 1 days | End: 2024-07-11
Payer: COMMERCIAL

## 2024-07-11 ENCOUNTER — APPOINTMENT (OUTPATIENT)
Dept: MRI IMAGING | Facility: HOSPITAL | Age: 49
End: 2024-07-11

## 2024-07-11 PROCEDURE — C8908: CPT

## 2024-07-11 PROCEDURE — A9585: CPT

## 2024-07-11 PROCEDURE — 77049 MRI BREAST C-+ W/CAD BI: CPT | Mod: 26

## 2024-07-11 PROCEDURE — C8937: CPT

## 2024-07-15 ENCOUNTER — TRANSCRIPTION ENCOUNTER (OUTPATIENT)
Age: 49
End: 2024-07-15

## 2024-07-22 ENCOUNTER — APPOINTMENT (OUTPATIENT)
Dept: PLASTIC SURGERY | Facility: CLINIC | Age: 49
End: 2024-07-22
Payer: COMMERCIAL

## 2024-07-22 DIAGNOSIS — L90.5 SCAR CONDITIONS AND FIBROSIS OF SKIN: ICD-10-CM

## 2024-07-22 PROCEDURE — 99213 OFFICE O/P EST LOW 20 MIN: CPT

## 2024-07-22 NOTE — HISTORY OF PRESENT ILLNESS
[FreeTextEntry1] : Ms. Joseph is s/p left segmental excision with breast conservation with Dr. Leal, and reconstruction with breast tissue rearrangement and contralateral breast reduction for symmetry. Patient c/o appearance of mediport removal scar. Well healed, good symmetry. Right chest mediport scar hypertrophic, widened Discussed possible scar revision vs steroid injection She would like to proceed with scar revision as in office procedure Will schedule in the near future

## 2024-07-22 NOTE — SURGICAL HISTORY
[de-identified] : 05/19/2023: left breast local tissue rearrangement and contralateral reduction for symmetry

## 2024-07-25 NOTE — HISTORY OF PRESENT ILLNESS
[de-identified] : 47 y/o female with h/o left breast cancer s/p neoadjuvant TCHP, 5/19/23 left lumpectomy/SLNB, local tissue rearrangement and contralateral reduction.  She completed adjuvant RT (8/28/23-9/21/23), and adjuvant Kanjinti/pertuzumab (completed 1/22/24).  She returns for f/u.  PMHx:  HTN, multinodular goiter, followed by ENT, had 5 biopsies that were benign, last thyroid US last year.  Denies any compressive symptoms, such as dysphagia or dyspnea.    Patient initially experienced R breast heaviness which she describes as similar to menstrual symptoms in November 2022 prompting diagnostic workup that detected a left breast cancer, invasive ductal carcinoma, ER/SD (-) HER-2 (+) with metastasis to lymph node. Left breast and axillary lymph node were both biopsied on 12/9/22.   Denies family history of breast or ovarian CA. Denies any history of breast surgeries or biopsies.  12/2/22 B/L dxMMG/US ([facility]): The left breast 0.6 x 0.5 x 0.5 cm indistinct irregular hypoechoic mass at 10:00 14 CMFN corresponds with mammography and is suspicious for malignancy. The 0.9 x 0.8 x 0.7 cm irregular complex cystic mass at 2:00 4 CMFN is indeterminate. The 1.8 x 1.2 cm abnormally thickened lymph node at 2:00 16 CMFN is suspicious for malignancy. No mammographic or sonographic evidence of malignancy in the right breast. BIRADS 5.  12/9/22 biopsy x 3: A. Left breast 2:00 4cmFN core biopsy: radial scar with calcifications, columnar cell change, cystic apocrine metaplasia. B. Left breast 2:00 16cm FN core biopsy: cortically thickened lymph node in the left axilla, metastatic carcinoma morphologically similar to part C. C. Left breast 10:00 14cm FN core biopsy: invasive moderately to poorly differentiated ductal carcinoma with micropapillary features, spanning at least 7 mm in this material.  Adjacent benign breast tissue with fibrocystic changes.  ER-, SD-, HER2+   12/30/22 MRI:  1. Left breast cancer 10:00 (14N) (buckle clip), newly diagnosed cancer.  Measures about 6 mm. 2. Left breast, 12:00 (15N) (near chest wall), there is an irregular 32 x 12 x 14 mm mass corresponding to the area of distortion seen on the recent mammogram.  There is no clip in this mass.  Recommend second look ultrasound and biopsy.  If not seen on ultrasound, recommend stereotactic biopsy. 3. Left 2:00 position, 4cm from  nipple (top hat shaped clip) there is an irregular 5 mm mass corresponding to the biopsy showing radial scar. 4. Left axillary adenopathy.  Corresponds to biopsy (cork clip) showing metastatic cancer.  1/6/23 PETCT:  1. Multiple hypermetabolic left breast lesions and hypermetabolic left axillary lymphadenopathy, which is consistent with breast malignancy with local colton spread.  No evidence of distant FDG avid metastatic disease. 2. Diffuse left-sided asymmetric enlargement of the thyroid gland with heterogeneous attenuation, typical of multinodular goiter.  There is also slight rightward tracheal deviation due to mass effect without tracheal narrowing.  1/10/23 ECHO: LVEF 65-70%  1/10/23 L breast US: Left breast 12:00 14 cm from the nipple, there is an irregular 33 x 7 x 15 mm mass corresponding to MRI finding. US guided L breast core biopsy: Invasive ductal carcinoma, poorly-differentiated, DCIS. Concordant malignant.  2/2/23 port placement.  4/17/23 MRI breast: near complete imaging response to neoadjuvant chemotherapy.  Previously noted masses are markedly smaller with slight residual areas of enhancement around the clips.  These may represent post-chemo effect, clip artifact or some residual disease.  There is no internal mammary adenopathy.  Previously enlarged left axillary lymph nodes are smaller than noted on 12/30/22 MRI.  The largest lymph node now measures about 1.6 cm (previously 3 cm) and does not contain a clip.  The previously biopsied lymph node is no longer seen.  4/18/23 ECHO 1. Normal left ventricular size and systolic function 2. Left ventricular global longitudinal strain was -20.90% 3. Normal right ventricular size and systolic function 4. Mild tricuspid regurgitation 5. No evidence of pulmonary hypertension, pulmonary artery systolic pressure is 29 mmHg 6. No pericardial effusion 7. Compared to the previous TTE performed on 1/10/23, there have been no changes  5/19/23 left lumpectomy/SLNB: No invasive carcinoma seen.  DCIS present within the tumor bed area. 0/7 LNs.  ypTis ypN0  7/18/23 ECHO: LVEF 60-65%  10/11/23 ECHO: LVEF 60-65%  1/9/24 ECHO: LVEF 65%  1/16/24: b/l MG/US: No mammographic or sonographic evidence of malignancy   7/11/24 MRI breast:  1. No MRI evidence of malignancy. 2. Upper inner right breast dermal enhancement at site of prior Mediport is likely post procedural.  Clinical correlation is recommended. [de-identified] : moderately to poorly invasive ductal carcinoma [de-identified] : ER-, [place]- HER2+  [de-identified] : 12/27/22 ([facility]) 47 HBOC gene panel: negative for pathogenic mutations [FreeTextEntry1] : Weekly paclitaxel x 12 (1/23/23-4/11/23) Weekly carboplatin x 6 (1/23/23- 2/28/23); discontinued due to neutropenia. Kanjinti/pertuzumab every 3 weeks (1/23/23-5/9/23, 6/6/23-1/22/24) [de-identified] : She saw Dr. Leal on 6/18/24.  MR breast on 7/11/24 showed no MRI evidence of malignancy.  She is scheduled for MG/US 1/21/25.  6/25/24 colonoscopy: normal mucosa, few small scattered diverticula in ascending colon. To repeat in 10 years.

## 2024-07-25 NOTE — HISTORY OF PRESENT ILLNESS
[de-identified] : 47 y/o female with h/o left breast cancer s/p neoadjuvant TCHP, 5/19/23 left lumpectomy/SLNB, local tissue rearrangement and contralateral reduction.  She completed adjuvant RT (8/28/23-9/21/23), and adjuvant Kanjinti/pertuzumab (completed 1/22/24).  She returns for f/u.  PMHx:  HTN, multinodular goiter, followed by ENT, had 5 biopsies that were benign, last thyroid US last year.  Denies any compressive symptoms, such as dysphagia or dyspnea.    Patient initially experienced R breast heaviness which she describes as similar to menstrual symptoms in November 2022 prompting diagnostic workup that detected a left breast cancer, invasive ductal carcinoma, ER/ME (-) HER-2 (+) with metastasis to lymph node. Left breast and axillary lymph node were both biopsied on 12/9/22.   Denies family history of breast or ovarian CA. Denies any history of breast surgeries or biopsies.  12/2/22 B/L dxMMG/US ([facility]): The left breast 0.6 x 0.5 x 0.5 cm indistinct irregular hypoechoic mass at 10:00 14 CMFN corresponds with mammography and is suspicious for malignancy. The 0.9 x 0.8 x 0.7 cm irregular complex cystic mass at 2:00 4 CMFN is indeterminate. The 1.8 x 1.2 cm abnormally thickened lymph node at 2:00 16 CMFN is suspicious for malignancy. No mammographic or sonographic evidence of malignancy in the right breast. BIRADS 5.  12/9/22 biopsy x 3: A. Left breast 2:00 4cmFN core biopsy: radial scar with calcifications, columnar cell change, cystic apocrine metaplasia. B. Left breast 2:00 16cm FN core biopsy: cortically thickened lymph node in the left axilla, metastatic carcinoma morphologically similar to part C. C. Left breast 10:00 14cm FN core biopsy: invasive moderately to poorly differentiated ductal carcinoma with micropapillary features, spanning at least 7 mm in this material.  Adjacent benign breast tissue with fibrocystic changes.  ER-, ME-, HER2+   12/30/22 MRI:  1. Left breast cancer 10:00 (14N) (buckle clip), newly diagnosed cancer.  Measures about 6 mm. 2. Left breast, 12:00 (15N) (near chest wall), there is an irregular 32 x 12 x 14 mm mass corresponding to the area of distortion seen on the recent mammogram.  There is no clip in this mass.  Recommend second look ultrasound and biopsy.  If not seen on ultrasound, recommend stereotactic biopsy. 3. Left 2:00 position, 4cm from  nipple (top hat shaped clip) there is an irregular 5 mm mass corresponding to the biopsy showing radial scar. 4. Left axillary adenopathy.  Corresponds to biopsy (cork clip) showing metastatic cancer.  1/6/23 PETCT:  1. Multiple hypermetabolic left breast lesions and hypermetabolic left axillary lymphadenopathy, which is consistent with breast malignancy with local colton spread.  No evidence of distant FDG avid metastatic disease. 2. Diffuse left-sided asymmetric enlargement of the thyroid gland with heterogeneous attenuation, typical of multinodular goiter.  There is also slight rightward tracheal deviation due to mass effect without tracheal narrowing.  1/10/23 ECHO: LVEF 65-70%  1/10/23 L breast US: Left breast 12:00 14 cm from the nipple, there is an irregular 33 x 7 x 15 mm mass corresponding to MRI finding. US guided L breast core biopsy: Invasive ductal carcinoma, poorly-differentiated, DCIS. Concordant malignant.  2/2/23 port placement.  4/17/23 MRI breast: near complete imaging response to neoadjuvant chemotherapy.  Previously noted masses are markedly smaller with slight residual areas of enhancement around the clips.  These may represent post-chemo effect, clip artifact or some residual disease.  There is no internal mammary adenopathy.  Previously enlarged left axillary lymph nodes are smaller than noted on 12/30/22 MRI.  The largest lymph node now measures about 1.6 cm (previously 3 cm) and does not contain a clip.  The previously biopsied lymph node is no longer seen.  4/18/23 ECHO 1. Normal left ventricular size and systolic function 2. Left ventricular global longitudinal strain was -20.90% 3. Normal right ventricular size and systolic function 4. Mild tricuspid regurgitation 5. No evidence of pulmonary hypertension, pulmonary artery systolic pressure is 29 mmHg 6. No pericardial effusion 7. Compared to the previous TTE performed on 1/10/23, there have been no changes  5/19/23 left lumpectomy/SLNB: No invasive carcinoma seen.  DCIS present within the tumor bed area. 0/7 LNs.  ypTis ypN0  7/18/23 ECHO: LVEF 60-65%  10/11/23 ECHO: LVEF 60-65%  1/9/24 ECHO: LVEF 65%  1/16/24: b/l MG/US: No mammographic or sonographic evidence of malignancy   7/11/24 MRI breast:  1. No MRI evidence of malignancy. 2. Upper inner right breast dermal enhancement at site of prior Mediport is likely post procedural.  Clinical correlation is recommended. [de-identified] : moderately to poorly invasive ductal carcinoma [de-identified] : ER-, [place]- HER2+  [de-identified] : 12/27/22 ([facility]) 47 HBOC gene panel: negative for pathogenic mutations [FreeTextEntry1] : Weekly paclitaxel x 12 (1/23/23-4/11/23) Weekly carboplatin x 6 (1/23/23- 2/28/23); discontinued due to neutropenia. Kanjinti/pertuzumab every 3 weeks (1/23/23-5/9/23, 6/6/23-1/22/24) [de-identified] : She saw Dr. Leal on 6/18/24.  MR breast on 7/11/24 showed no MRI evidence of malignancy.  She is scheduled for MG/US 1/21/25.  6/25/24 colonoscopy: normal mucosa, few small scattered diverticula in ascending colon. To repeat in 10 years.

## 2024-07-26 ENCOUNTER — APPOINTMENT (OUTPATIENT)
Dept: HEMATOLOGY ONCOLOGY | Facility: CLINIC | Age: 49
End: 2024-07-26
Payer: COMMERCIAL

## 2024-07-26 VITALS
DIASTOLIC BLOOD PRESSURE: 89 MMHG | BODY MASS INDEX: 33.13 KG/M2 | SYSTOLIC BLOOD PRESSURE: 149 MMHG | RESPIRATION RATE: 20 BRPM | HEIGHT: 62 IN | WEIGHT: 180 LBS | TEMPERATURE: 98.1 F | OXYGEN SATURATION: 98 % | HEART RATE: 63 BPM

## 2024-07-26 DIAGNOSIS — C50.912 MALIGNANT NEOPLASM OF UNSPECIFIED SITE OF LEFT FEMALE BREAST: ICD-10-CM

## 2024-07-26 PROCEDURE — 99213 OFFICE O/P EST LOW 20 MIN: CPT

## 2024-11-19 ENCOUNTER — APPOINTMENT (OUTPATIENT)
Dept: RADIATION ONCOLOGY | Facility: CLINIC | Age: 49
End: 2024-11-19

## 2024-11-19 PROCEDURE — 99215 OFFICE O/P EST HI 40 MIN: CPT

## 2025-01-08 NOTE — ED ADULT NURSE NOTE - CHIEF COMPLAINT
Pt's wife called with c/o pt's stiches popping out a little. They said the glue came off and there is two sutures sticking out. I gave them the okay to cut them right below the knot. I told them that if they have more problems to give us a call back to get an appointment. Pt's wife denied a hole in the incision. Pt's wife voiced understanding.    The patient is a 47y Female complaining of arm pain/injury.

## 2025-01-21 ENCOUNTER — APPOINTMENT (OUTPATIENT)
Dept: ULTRASOUND IMAGING | Facility: CLINIC | Age: 50
End: 2025-01-21
Payer: COMMERCIAL

## 2025-01-21 ENCOUNTER — RESULT REVIEW (OUTPATIENT)
Age: 50
End: 2025-01-21

## 2025-01-21 ENCOUNTER — APPOINTMENT (OUTPATIENT)
Dept: MAMMOGRAPHY | Facility: CLINIC | Age: 50
End: 2025-01-21
Payer: COMMERCIAL

## 2025-01-21 PROCEDURE — 76641 ULTRASOUND BREAST COMPLETE: CPT | Mod: 50

## 2025-01-21 PROCEDURE — 77062 BREAST TOMOSYNTHESIS BI: CPT

## 2025-01-21 PROCEDURE — 77066 DX MAMMO INCL CAD BI: CPT

## 2025-01-22 ENCOUNTER — NON-APPOINTMENT (OUTPATIENT)
Age: 50
End: 2025-01-22

## 2025-01-24 ENCOUNTER — RESULT REVIEW (OUTPATIENT)
Age: 50
End: 2025-01-24

## 2025-01-24 ENCOUNTER — APPOINTMENT (OUTPATIENT)
Dept: ULTRASOUND IMAGING | Facility: CLINIC | Age: 50
End: 2025-01-24
Payer: COMMERCIAL

## 2025-01-24 PROCEDURE — 77065 DX MAMMO INCL CAD UNI: CPT | Mod: LT

## 2025-01-24 PROCEDURE — 19083 BX BREAST 1ST LESION US IMAG: CPT | Mod: LT

## 2025-01-24 PROCEDURE — A4648: CPT

## 2025-01-28 ENCOUNTER — APPOINTMENT (OUTPATIENT)
Dept: BREAST CENTER | Facility: CLINIC | Age: 50
End: 2025-01-28

## 2025-01-28 VITALS
BODY MASS INDEX: 33.86 KG/M2 | HEIGHT: 62 IN | WEIGHT: 184 LBS | HEART RATE: 71 BPM | DIASTOLIC BLOOD PRESSURE: 82 MMHG | SYSTOLIC BLOOD PRESSURE: 119 MMHG

## 2025-01-28 DIAGNOSIS — Z78.9 OTHER SPECIFIED HEALTH STATUS: ICD-10-CM

## 2025-01-28 DIAGNOSIS — Z85.3 ENCOUNTER FOR FOLLOW-UP EXAMINATION AFTER COMPLETED TREATMENT FOR MALIGNANT NEOPLASM: ICD-10-CM

## 2025-01-28 DIAGNOSIS — Z08 ENCOUNTER FOR FOLLOW-UP EXAMINATION AFTER COMPLETED TREATMENT FOR MALIGNANT NEOPLASM: ICD-10-CM

## 2025-01-28 DIAGNOSIS — C50.912 MALIGNANT NEOPLASM OF UNSPECIFIED SITE OF LEFT FEMALE BREAST: ICD-10-CM

## 2025-01-28 PROCEDURE — 93702 BIS XTRACELL FLUID ANALYSIS: CPT | Mod: NC

## 2025-01-28 PROCEDURE — 99213 OFFICE O/P EST LOW 20 MIN: CPT

## 2025-02-07 ENCOUNTER — APPOINTMENT (OUTPATIENT)
Dept: HEMATOLOGY ONCOLOGY | Facility: CLINIC | Age: 50
End: 2025-02-07
Payer: COMMERCIAL

## 2025-02-07 VITALS
DIASTOLIC BLOOD PRESSURE: 85 MMHG | WEIGHT: 185 LBS | OXYGEN SATURATION: 96 % | TEMPERATURE: 98.2 F | HEIGHT: 62 IN | BODY MASS INDEX: 34.04 KG/M2 | RESPIRATION RATE: 18 BRPM | SYSTOLIC BLOOD PRESSURE: 136 MMHG | HEART RATE: 71 BPM

## 2025-02-07 DIAGNOSIS — Z17.31 MALIGNANT NEOPLASM OF UNSPECIFIED SITE OF LEFT FEMALE BREAST: ICD-10-CM

## 2025-02-07 DIAGNOSIS — C50.912 MALIGNANT NEOPLASM OF UNSPECIFIED SITE OF LEFT FEMALE BREAST: ICD-10-CM

## 2025-02-07 PROCEDURE — 99214 OFFICE O/P EST MOD 30 MIN: CPT

## 2025-02-26 ENCOUNTER — NON-APPOINTMENT (OUTPATIENT)
Age: 50
End: 2025-02-26

## 2025-03-04 ENCOUNTER — RESULT REVIEW (OUTPATIENT)
Age: 50
End: 2025-03-04

## 2025-03-04 ENCOUNTER — OUTPATIENT (OUTPATIENT)
Dept: OUTPATIENT SERVICES | Facility: HOSPITAL | Age: 50
LOS: 1 days | End: 2025-03-04
Payer: COMMERCIAL

## 2025-03-04 DIAGNOSIS — C50.912 MALIGNANT NEOPLASM OF UNSPECIFIED SITE OF LEFT FEMALE BREAST: ICD-10-CM

## 2025-03-04 PROCEDURE — 93306 TTE W/DOPPLER COMPLETE: CPT | Mod: 26

## 2025-03-04 PROCEDURE — 93306 TTE W/DOPPLER COMPLETE: CPT

## 2025-03-11 ENCOUNTER — APPOINTMENT (OUTPATIENT)
Dept: MRI IMAGING | Facility: HOSPITAL | Age: 50
End: 2025-03-11

## 2025-03-17 ENCOUNTER — OUTPATIENT (OUTPATIENT)
Dept: OUTPATIENT SERVICES | Facility: HOSPITAL | Age: 50
LOS: 1 days | End: 2025-03-17
Payer: COMMERCIAL

## 2025-03-17 ENCOUNTER — RESULT REVIEW (OUTPATIENT)
Age: 50
End: 2025-03-17

## 2025-03-17 ENCOUNTER — APPOINTMENT (OUTPATIENT)
Dept: PLASTIC SURGERY | Facility: CLINIC | Age: 50
End: 2025-03-17
Payer: COMMERCIAL

## 2025-03-17 DIAGNOSIS — L90.5 SCAR CONDITIONS AND FIBROSIS OF SKIN: ICD-10-CM

## 2025-03-17 PROCEDURE — 88304 TISSUE EXAM BY PATHOLOGIST: CPT | Mod: 26

## 2025-03-17 PROCEDURE — 88304 TISSUE EXAM BY PATHOLOGIST: CPT

## 2025-03-17 PROCEDURE — 13101 CMPLX RPR TRUNK 2.6-7.5 CM: CPT

## 2025-03-24 ENCOUNTER — APPOINTMENT (OUTPATIENT)
Dept: PLASTIC SURGERY | Facility: CLINIC | Age: 50
End: 2025-03-24
Payer: COMMERCIAL

## 2025-03-24 DIAGNOSIS — L90.5 SCAR CONDITIONS AND FIBROSIS OF SKIN: ICD-10-CM

## 2025-03-24 PROCEDURE — 99024 POSTOP FOLLOW-UP VISIT: CPT

## 2025-06-24 ENCOUNTER — APPOINTMENT (OUTPATIENT)
Dept: BREAST CENTER | Facility: CLINIC | Age: 50
End: 2025-06-24

## 2025-07-15 ENCOUNTER — APPOINTMENT (OUTPATIENT)
Dept: OTOLARYNGOLOGY | Facility: CLINIC | Age: 50
End: 2025-07-15
Payer: COMMERCIAL

## 2025-07-15 ENCOUNTER — NON-APPOINTMENT (OUTPATIENT)
Age: 50
End: 2025-07-15

## 2025-07-15 VITALS
DIASTOLIC BLOOD PRESSURE: 88 MMHG | WEIGHT: 187 LBS | TEMPERATURE: 97.4 F | BODY MASS INDEX: 34.41 KG/M2 | HEIGHT: 62 IN | SYSTOLIC BLOOD PRESSURE: 133 MMHG | HEART RATE: 70 BPM

## 2025-07-15 PROCEDURE — 99203 OFFICE O/P NEW LOW 30 MIN: CPT | Mod: 25

## 2025-07-15 PROCEDURE — 60100 BIOPSY OF THYROID: CPT

## 2025-07-15 PROCEDURE — 31575 DIAGNOSTIC LARYNGOSCOPY: CPT

## 2025-07-17 LAB — FNA, THYROID: NORMAL

## 2025-07-29 ENCOUNTER — OUTPATIENT (OUTPATIENT)
Dept: OUTPATIENT SERVICES | Facility: HOSPITAL | Age: 50
LOS: 1 days | End: 2025-07-29
Payer: COMMERCIAL

## 2025-07-29 ENCOUNTER — APPOINTMENT (OUTPATIENT)
Dept: MRI IMAGING | Facility: HOSPITAL | Age: 50
End: 2025-07-29

## 2025-07-29 PROCEDURE — 77049 MRI BREAST C-+ W/CAD BI: CPT | Mod: 26

## 2025-07-29 PROCEDURE — A9585: CPT

## 2025-07-29 PROCEDURE — C8937: CPT

## 2025-07-29 PROCEDURE — C8908: CPT

## 2025-08-25 ENCOUNTER — NON-APPOINTMENT (OUTPATIENT)
Age: 50
End: 2025-08-25

## 2025-08-26 ENCOUNTER — APPOINTMENT (OUTPATIENT)
Dept: BREAST CENTER | Facility: CLINIC | Age: 50
End: 2025-08-26

## 2025-08-26 DIAGNOSIS — Z08 ENCOUNTER FOR FOLLOW-UP EXAMINATION AFTER COMPLETED TREATMENT FOR MALIGNANT NEOPLASM: ICD-10-CM

## 2025-08-26 DIAGNOSIS — C50.912 MALIGNANT NEOPLASM OF UNSPECIFIED SITE OF LEFT FEMALE BREAST: ICD-10-CM

## 2025-08-26 DIAGNOSIS — Z85.3 ENCOUNTER FOR FOLLOW-UP EXAMINATION AFTER COMPLETED TREATMENT FOR MALIGNANT NEOPLASM: ICD-10-CM

## 2025-09-05 ENCOUNTER — NON-APPOINTMENT (OUTPATIENT)
Age: 50
End: 2025-09-05

## 2025-09-10 ENCOUNTER — APPOINTMENT (OUTPATIENT)
Dept: BREAST CENTER | Facility: CLINIC | Age: 50
End: 2025-09-10

## 2025-09-10 VITALS
BODY MASS INDEX: 33.13 KG/M2 | HEIGHT: 62 IN | SYSTOLIC BLOOD PRESSURE: 126 MMHG | HEART RATE: 66 BPM | DIASTOLIC BLOOD PRESSURE: 81 MMHG | WEIGHT: 180 LBS

## 2025-09-10 DIAGNOSIS — Z85.3 ENCOUNTER FOR FOLLOW-UP EXAMINATION AFTER COMPLETED TREATMENT FOR MALIGNANT NEOPLASM: ICD-10-CM

## 2025-09-10 DIAGNOSIS — C50.912 MALIGNANT NEOPLASM OF UNSPECIFIED SITE OF LEFT FEMALE BREAST: ICD-10-CM

## 2025-09-10 DIAGNOSIS — Z17.31 MALIGNANT NEOPLASM OF UNSPECIFIED SITE OF LEFT FEMALE BREAST: ICD-10-CM

## 2025-09-10 DIAGNOSIS — Z08 ENCOUNTER FOR FOLLOW-UP EXAMINATION AFTER COMPLETED TREATMENT FOR MALIGNANT NEOPLASM: ICD-10-CM

## (undated) DEVICE — SUT MONOCRYL 3-0 18" PS-2 UNDYED

## (undated) DEVICE — BRA PINK XL 39-42"

## (undated) DEVICE — DRSG DERMABOND 0.7ML

## (undated) DEVICE — SUT SILK 2-0 30" SH

## (undated) DEVICE — PACK BREAST

## (undated) DEVICE — NDL HYPO SAFE 22G X 1.5" (BLACK)

## (undated) DEVICE — WARMING BLANKET FULL UNDERBODY

## (undated) DEVICE — BRA PINK 3XL 45-48"

## (undated) DEVICE — DRAPE TOP SHEET 53" X 101"

## (undated) DEVICE — DRSG TELFA 3 X 8

## (undated) DEVICE — DRAPE SPLIT SHEET 77" X 108"

## (undated) DEVICE — DRSG DERMABOND PRINEO 60CM

## (undated) DEVICE — BRA PINK 4XL 48-51"

## (undated) DEVICE — OSTOMY POUCH 4" FLANGE

## (undated) DEVICE — BRA PINK LG 36-39"

## (undated) DEVICE — SYR CATH TIP 2 OZ

## (undated) DEVICE — SUT QUILL MONODERM 3-0 30CM 19MM

## (undated) DEVICE — GLV 8 PROTEXIS (WHITE)

## (undated) DEVICE — SUT NYLON 2-0 18" FS

## (undated) DEVICE — GLV 7.5 PROTEXIS (WHITE)

## (undated) DEVICE — ELCTR BOVIE TIP BLADE MEGADYNE E-Z CLEAN 4" EXTENDED

## (undated) DEVICE — Device

## (undated) DEVICE — VENODYNE/SCD SLEEVE CALF MEDIUM

## (undated) DEVICE — BRA PINK MED 33-36"

## (undated) DEVICE — CANNULA MICROAIRE FLARED MERCEDES 5MM 30CM

## (undated) DEVICE — ELCTR BOVIE TIP BLADE INSULATED 2.8" EDGE WITH SAFETY

## (undated) DEVICE — PREP CHLORAPREP HI-LITE ORANGE 26ML

## (undated) DEVICE — SUT VICRYL 2-0 27" CT-1 UNDYED

## (undated) DEVICE — ELCTR BOVIE PENCIL SMOKE EVACUATION

## (undated) DEVICE — TUBING MICROAIRE ASPIRATION SET 12FT

## (undated) DEVICE — DRAPE TOWEL BLUE 17" X 24"

## (undated) DEVICE — DRSG TEGADERM 4X4.75"

## (undated) DEVICE — PACK GENERAL MINOR

## (undated) DEVICE — BRA PINK 2XL 42-45"

## (undated) DEVICE — WARMING BLANKET LOWER ADULT

## (undated) DEVICE — DRAIN RESERVOIR FOR JACKSON PRATT 100CC CARDINAL

## (undated) DEVICE — CANNULA MICROAIRE FLARED MERCEDES 4MM 30CM

## (undated) DEVICE — SUT VICRYL 4-0 27" SH UNDYED

## (undated) DEVICE — DRSG STERISTRIPS 0.5 X 4"

## (undated) DEVICE — SUT PLAIN GUT FAST ABSORBING 5-0 PC-1

## (undated) DEVICE — DRAPE PROBE COVER 5" X 96"

## (undated) DEVICE — SUT MONOCRYL 4-0 18" PS-2

## (undated) DEVICE — ELCTR BOVIE TIP BLADE INSULATED 2.75" EDGE

## (undated) DEVICE — MARKING PEN W RULER

## (undated) DEVICE — STAPLER SKIN VISI-STAT 35 WIDE

## (undated) DEVICE — ELCTR PENCIL SMOKE EVACUATOR COATED PUSH BUTTON 70MM